# Patient Record
Sex: MALE | Race: WHITE | NOT HISPANIC OR LATINO | Employment: UNEMPLOYED | URBAN - METROPOLITAN AREA
[De-identification: names, ages, dates, MRNs, and addresses within clinical notes are randomized per-mention and may not be internally consistent; named-entity substitution may affect disease eponyms.]

---

## 2017-10-16 ENCOUNTER — ALLSCRIPTS OFFICE VISIT (OUTPATIENT)
Dept: OTHER | Facility: OTHER | Age: 52
End: 2017-10-16

## 2017-10-17 NOTE — PROGRESS NOTES
Assessment  1  Cervicalgia (723 1) (M54 2)    Plan  Allergic rhinitis    · Mometasone Furoate 50 MCG/ACT Nasal Suspension (Nasonex); USE 2  SPRAYS IN EACH NOSTRIL ONCE DAILY AS NEEDED FOR ALLERGIES  Anxiety    · LORazepam 1 MG Oral Tablet; TAKE ONE TABLET AT BEDTIME AS NEEDED  Cervicalgia    · Cyclobenzaprine HCl - 5 MG Oral Tablet; 1-2 tab PO at bedtime as needed for  muscle spasm   · Meloxicam 7 5 MG Oral Tablet; take 1- 2  tablet twice daily as needed for pain (do  not exceed for then 4 tabs daily)   · *1 - [facility]  *  Status: Active   Requested for: 98QMA5105  Care Summary provided  : Yes  Erectile dysfunction of non-organic origin    · Viagra 100 MG Oral Tablet; Take 1/2 to 1 tablet one hour pior to activity    Discussion/Summary    Cervicalgia - urged heat/massage/stretches - shown, will try trial of Meloxicam and Flexeril - SE of medication including sedation reviewed - urged no driving/operating heavy machinery until he knows how med affects him, stressed mult times do NOT take Flexeril with the Ativan, order for PT given, call with new/worsening symptoms or if not better in 2 wks  to do labs - new order given  to make PE as he is overdue and needs to discuss prostate and colon CA screening  The patient was counseled regarding instructions for management,-- risk factor reductions,-- prognosis,-- patient and family education,-- impressions,-- risks and benefits of treatment options,-- importance of compliance with treatment  Possible side effects of new medications were reviewed with the patient/guardian today  The treatment plan was reviewed with the patient/guardian  The patient/guardian understands and agrees with the treatment plan     Self Referrals: No      Chief Complaint  L shoulder pain      History of Present Illness  HPI: Pt here with L shoulder pain for about 2 wks  He is not able to id a specific fall/injury/trauma   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  The pain starts low cervical region and does not radiate  He states it is a tightness and does not radiate  The pain is intermittent and is worse at the end of the day and worse with certain movements of the neck  The pain is bad enough he is not sleeping well  He has such stiffness in the am upon awakening  He notes no UE radiation of symptoms  He notes no weakness/numbness/tingling  He has tried Ibuprofen w/o much relief  He has had 2 massages which do not help much either  He notes no previous h/o neck or shoulder surgery  still has not done his BW yet - new order given and told to do and call for appt for PE  refilled - anxiety well controlled and he uses it very rarely - only if can't relax and fall asleep      Review of Systems    Constitutional: no fever,-- no chills-- and-- no recent weight loss  ENT: no sore throat-- and-- no nasal discharge  Cardiovascular: no chest pain-- and-- no palpitations  Respiratory: no shortness of breath-- and-- no cough  Gastrointestinal: no abdominal pain,-- no constipation-- and-- no diarrhea  Genitourinary: no dysuria  Musculoskeletal: arthralgias-- and-- myalgias  Integumentary: no rashes  Neurological: no headache-- and-- no dizziness  Active Problems  1  Allergic rhinitis (477 9) (J30 9)   2  Anxiety (300 00) (F41 9)   3  Erectile dysfunction of non-organic origin (302 72) (F52 21)   4  Essential hypertriglyceridemia (272 1) (E78 1)   5  Insomnia (780 52) (G47 00)   6  Multiple joint pain (719 49) (M25 50)   7  Need for tetanus booster (V03 7) (Z23)   8  Obesity (278 00) (E66 9)   9  Other muscle spasm (728 85) (M62 838)   10  Screening for colon cancer (V76 51) (Z12 11)   11  Screening for prostate cancer (V76 44) (Z12 5)    Past Medical History  Active Problems And Past Medical History Reviewed: The active problems and past medical history were reviewed and updated today        Social History   · Being A Social Drinker   · Cigar smoker (305 1) (F10 290)   · Marital History - Currently    · Never A Smoker   · Working Full Time  The social history was reviewed and updated today  Family History  Family History Reviewed: The family history was reviewed and updated today  Current Meds   1  Aspirin 81 MG TABS; TAKE 1 TABLET DAILY; Therapy: 41YXH6696 to (Evaluate:69Rgz4474) Recorded   2  Glucosamine Chondroitin Complx Oral Tablet; TAKE 1 TABLET DAILY AS DIRECTED; Therapy: 17HAJ6169 to Recorded   3  LORazepam 1 MG Oral Tablet; TAKE ONE TABLET AT BEDTIME AS NEEDED; Therapy: 62Yhe5666 to (Evaluate:20Nov2016); Last Rx:22Btb2972 Ordered   4  Nasonex 50 MCG/ACT Nasal Suspension; USE 2 SPRAYS IN EACH NOSTRIL ONCE   DAILY AS NEEDED FOR ALLERGIES; Therapy: 62FVZ8344 to (Evaluate:70Jsk7596)  Requested for: 86Ste5348; Last   Rx:56Ofs4008 Ordered   5  Viagra 100 MG Oral Tablet; Take 1/2 to 1 tablet one hour pior to activity; Therapy: 72CVX0879 to (Evaluate:23Jan2017)  Requested for: 25Oct2016; Last   Rx:83Fnf1241 Ordered    The medication list was reviewed and updated today  Allergies  1  No Known Drug Allergies    Vitals   Recorded: 65IIH0491 03:47PM   Temperature 96 7 F   Heart Rate 78   Systolic 369   Diastolic 70   Height 5 ft 11 in   Weight 218 lb    BMI Calculated 30 41   BSA Calculated 2 19     Physical Exam    Constitutional   General appearance: No acute distress, well appearing and well nourished  Pulmonary   Respiratory effort: No increased work of breathing or signs of respiratory distress  Auscultation of lungs: Clear to auscultation, equal breath sounds bilaterally, no wheezes, no rales, no rhonci  Cardiovascular   Auscultation of heart: Normal rate and rhythm, normal S1 and S2, without murmurs      Examination of extremities for edema and/or varicosities: Normal     Musculoskeletal   Gait and station: Normal     Inspection/palpation of joints, bones, and muscles: Abnormal  -- no pain with palp of spinous processes of cervical spine, tenderness to L low cervical paraspinal musculature, dec in rotation B/L but nml flex/ext, 5/5 B/L UE muscle strength  Neurologic   Reflexes: 2+ and symmetric  -- 2/4 bicep reflexes B/L  Sensation: No sensory loss  -- sensation intact B/L UE to light touch     Psychiatric   Mood and affect: Normal          Signatures   Electronically signed by : Carlos A Montilla DO; Oct 16 2017  4:13PM EST                       (Author)

## 2017-11-14 ENCOUNTER — GENERIC CONVERSION - ENCOUNTER (OUTPATIENT)
Dept: OTHER | Facility: OTHER | Age: 52
End: 2017-11-14

## 2017-11-21 ENCOUNTER — APPOINTMENT (OUTPATIENT)
Dept: PHYSICAL THERAPY | Facility: CLINIC | Age: 52
End: 2017-11-21
Payer: COMMERCIAL

## 2017-11-21 PROCEDURE — G8979 MOBILITY GOAL STATUS: HCPCS

## 2017-11-21 PROCEDURE — G8978 MOBILITY CURRENT STATUS: HCPCS

## 2017-11-21 PROCEDURE — 97014 ELECTRIC STIMULATION THERAPY: CPT

## 2017-11-21 PROCEDURE — 97140 MANUAL THERAPY 1/> REGIONS: CPT

## 2017-11-21 PROCEDURE — 97010 HOT OR COLD PACKS THERAPY: CPT

## 2017-11-21 PROCEDURE — 97161 PT EVAL LOW COMPLEX 20 MIN: CPT

## 2017-11-21 PROCEDURE — G0283 ELEC STIM OTHER THAN WOUND: HCPCS

## 2017-11-24 ENCOUNTER — ALLSCRIPTS OFFICE VISIT (OUTPATIENT)
Dept: OTHER | Facility: OTHER | Age: 52
End: 2017-11-24

## 2017-11-24 ENCOUNTER — HOSPITAL ENCOUNTER (OUTPATIENT)
Dept: RADIOLOGY | Facility: HOSPITAL | Age: 52
Discharge: HOME | End: 2017-11-24
Payer: COMMERCIAL

## 2017-11-24 ENCOUNTER — TRANSCRIBE ORDERS (OUTPATIENT)
Dept: ADMINISTRATIVE | Facility: HOSPITAL | Age: 52
End: 2017-11-24

## 2017-11-24 DIAGNOSIS — M54.2 CERVICALGIA: ICD-10-CM

## 2017-11-24 PROCEDURE — 72050 X-RAY EXAM NECK SPINE 4/5VWS: CPT

## 2017-11-26 NOTE — PROGRESS NOTES
Assessment    1  Cervicalgia (723 1) (M54 2)   2  Other muscle spasm (728 85) (D36 035)    Plan  Cervicalgia    · PredniSONE 10 MG Oral Tablet; Take 4 tablets for 2 days, 3 tablets for 2 days, 2tablets for 2 days, 1 tablet for 2 days   · * XR SPINE CERVICAL COMPLETE 4 OR 5 VW NON INJURY; Status:Active; Requestedfor:24Nov2017; Other muscle spasm    · DiazePAM 5 MG Oral Tablet; Take 1 twice daily as needed for muscle spasm    Discussion/Summary    Ongoing neck pain w/muscle spasm of trapezius  Given non-response to 2 muscle relaxers and NSAID will use course of prednisone taper and short term use of valium to take for sleep (advised not to be used w/alcohol or lorazepam and do not drive or operate machinery after taking)  Given persistence, order given to x-ray c-spine  Pending response to meds and PT, may need consult to pain/spine center  shot declined today  Possible side effects of new medications were reviewed with the patient/guardian today  The treatment plan was reviewed with the patient/guardian  The patient/guardian understands and agrees with the treatment plan      Chief Complaint  shoulder pain       History of Present Illness  HPI: States he has seen Dr Romain Kellogg twice in a month for shoulder pain  Has started PT this week and will do so twice weekly  Told by PT he has a pinched nerve in his neck and bad muscle spasms  Not sleeping well despite meds given but states second scripts given did help better than first ones  PT did help  Has been using ice and heat for 4 weeks  body is exhausted due to no sleep and he has been getting sick  Review of Systems   Constitutional: feeling poorly  Musculoskeletal: myalgias, but-- as noted in HPI--   The patient presents with complaints of severe neck arthralgias, described as dull and aching, radiating to the left upper arm starting about 1 month ago  He is currently experiencing arthralgias  Active Problems  1  Allergic rhinitis (477 9) (J30 9)   2  Anxiety (300 00) (F41 9)   3  Cervicalgia (723 1) (M54 2)   4  Erectile dysfunction of non-organic origin (302 72) (F52 21)   5  Essential hypertriglyceridemia (272 1) (E78 1)   6  Insomnia (780 52) (G47 00)   7  Need for tetanus booster (V03 7) (Z23)   8  Obesity (278 00) (E66 9)   9  Other muscle spasm (728 85) (M62 838)   10  Screening for colon cancer (V76 51) (Z12 11)   11  Screening for prostate cancer (V76 44) (Z12 5)    Past Medical History  1  Denied: History of Alcohol abuse   2  History of Blood pressure elevated (401 9) (I10)   3  History of Encounter for pre-employment examination (V70 5) (Z02 1)   4  History of acute sinusitis (V12 69) (Z87 09)   5  Denied: History of depression   6  History of sinusitis (V12 69) (Z87 09)   7  Denied: History of substance abuse   8  History of Multiple joint pain (719 49) (M25 50)   9  History of URTI (acute upper respiratory infection) (465 9) (J06 9)  Active Problems And Past Medical History Reviewed: The active problems and past medical history were reviewed and updated today  Family History  Mother    1  Denied: Family history of Alcohol abuse   2  Family history of Diabetes Mellitus (V18 0)   3  Denied: Family history of depression   4  Denied: Family history of substance abuse  Father    5  Family history of Acute Myocardial Infarction (V17 3)   6  Denied: Family history of Alcohol abuse   7  Family history of Coronary Artery Disease (V17 49)   8  Denied: Family history of depression   9  Denied: Family history of substance abuse   10  Family history of Ischemic Stroke (V17 1)  Sibling    6  Denied: Family history of Alcohol abuse   12  Denied: Family history of depression   15  Denied: Family history of substance abuse    Social History     · Being A Social Drinker   · Cigar smoker (305 1) (F17 290)   · Marital History - Currently    · Never A Smoker   · Working Full Time  The social history was reviewed and updated today  Current Meds   1  Aspirin 81 MG TABS; TAKE 1 TABLET DAILY; Therapy: 57RQA0896 to (Evaluate:48Aal1466) Recorded   2  Glucosamine Chondroitin Complx Oral Tablet; TAKE 1 TABLET DAILY AS DIRECTED; Therapy: 48JCQ7015 to Recorded   3  LORazepam 1 MG Oral Tablet; TAKE ONE TABLET AT BEDTIME AS NEEDED; Therapy: 45YSR8865 to (Evaluate:14Jan2018); Last Rx:16Oct2017 Ordered   4  Methocarbamol 750 MG Oral Tablet; 1 tab PO q 8 hrs prn spasm; Therapy: 55KZT3862 to (Evaluate:88Veh5986)  Requested for: 34BTN8155; Last Rx:14Nov2017 Ordered   5  Mometasone Furoate 50 MCG/ACT Nasal Suspension; USE 2 SPRAYS IN EACH NOSTRIL ONCE DAILY AS NEEDED FOR ALLERGIES; Therapy: 17ICT4969 to (Evaluate:35Bdd7133)  Requested for: 83CVO0360; Last Rx:16Oct2017 Ordered   6  TraMADol HCl - 50 MG Oral Tablet; TAKE 1 TABLET EVERY 6 TO 8 HOURS AS NEEDED FOR PAIN; Therapy: 45QKL6850 to (Evaluate:24Nov2017); Last Rx:14Nov2017 Ordered   7  Viagra 100 MG Oral Tablet; Take 1/2 to 1 tablet one hour pior to activity; Therapy: 66TEX9821 to (Evaluate:14Jan2018)  Requested for: 92CRB9143; Last Rx:16Oct2017 Ordered    The medication list was reviewed and updated today  Allergies  1  No Known Drug Allergies    Vitals   Recorded: 04TPB9856 11:10AM   Temperature 96 F, Tympanic   Heart Rate 72   Systolic 881, Sitting   Diastolic 90, Sitting   BP CUFF SIZE Large   Height 6 ft    Weight 222 lb    BMI Calculated 30 11   BSA Calculated 2 23       Physical Exam   Constitutional  General appearance: Abnormal   uncomfortable  Eyes  Conjunctiva and lids: No swelling, erythema, or discharge  Pulmonary  Respiratory effort: No increased work of breathing or signs of respiratory distress  Musculoskeletal  Gait and station: Normal    Inspection/palpation of joints, bones, and muscles: Abnormal  -- tender left paracervical muscles to left thoracoscapular border, limited lateral bend/rotation of c-spine, FROM left shoulder    Psychiatric  Mood and affect: Abnormal   Mood and Affect: concerned-- and-- frustrated  Results/Data  PHQ-2 Adult Depression Screening 86QGO5964 11:12AM User, Ahs     Test Name Result Flag Reference   PHQ-2 Adult Depression Score 0       Over the last two weeks, how often have you been bothered by any of the following problems? Little interest or pleasure in doing things: Not at all - 0 Feeling down, depressed, or hopeless: Not at all - 0   PHQ-2 Adult Depression Screening Negative           Attending Note  Collaborating Physician Note: Collaborating Note: I agree with the Advanced Practitioner note  Signatures   Electronically signed by :  FRANKIE Garzon; Nov 24 2017 12:00PM EST                       (Author)    Electronically signed by : Keke Douglass MD; Nov 25 2017  6:57AM EST                       (Co-author)

## 2017-11-27 ENCOUNTER — GENERIC CONVERSION - ENCOUNTER (OUTPATIENT)
Dept: OTHER | Facility: OTHER | Age: 52
End: 2017-11-27

## 2017-11-28 ENCOUNTER — APPOINTMENT (OUTPATIENT)
Dept: PHYSICAL THERAPY | Facility: CLINIC | Age: 52
End: 2017-11-28
Payer: COMMERCIAL

## 2017-11-28 PROCEDURE — 97010 HOT OR COLD PACKS THERAPY: CPT

## 2017-11-28 PROCEDURE — 97140 MANUAL THERAPY 1/> REGIONS: CPT

## 2017-11-28 PROCEDURE — 97110 THERAPEUTIC EXERCISES: CPT

## 2017-11-30 ENCOUNTER — APPOINTMENT (OUTPATIENT)
Dept: PHYSICAL THERAPY | Facility: CLINIC | Age: 52
End: 2017-11-30
Payer: COMMERCIAL

## 2017-11-30 PROCEDURE — 97140 MANUAL THERAPY 1/> REGIONS: CPT

## 2017-11-30 PROCEDURE — 97110 THERAPEUTIC EXERCISES: CPT

## 2017-11-30 PROCEDURE — 97035 APP MDLTY 1+ULTRASOUND EA 15: CPT

## 2017-11-30 PROCEDURE — 97010 HOT OR COLD PACKS THERAPY: CPT

## 2017-12-05 ENCOUNTER — APPOINTMENT (OUTPATIENT)
Dept: PHYSICAL THERAPY | Facility: CLINIC | Age: 52
End: 2017-12-05
Payer: COMMERCIAL

## 2017-12-05 PROCEDURE — 97035 APP MDLTY 1+ULTRASOUND EA 15: CPT

## 2017-12-05 PROCEDURE — 97140 MANUAL THERAPY 1/> REGIONS: CPT

## 2017-12-05 PROCEDURE — G0283 ELEC STIM OTHER THAN WOUND: HCPCS

## 2017-12-05 PROCEDURE — 97010 HOT OR COLD PACKS THERAPY: CPT

## 2017-12-05 PROCEDURE — 97012 MECHANICAL TRACTION THERAPY: CPT

## 2017-12-05 PROCEDURE — 97014 ELECTRIC STIMULATION THERAPY: CPT

## 2017-12-07 ENCOUNTER — APPOINTMENT (OUTPATIENT)
Dept: PHYSICAL THERAPY | Facility: CLINIC | Age: 52
End: 2017-12-07
Payer: COMMERCIAL

## 2017-12-07 PROCEDURE — 97012 MECHANICAL TRACTION THERAPY: CPT

## 2017-12-07 PROCEDURE — 97010 HOT OR COLD PACKS THERAPY: CPT

## 2017-12-07 PROCEDURE — 97110 THERAPEUTIC EXERCISES: CPT

## 2017-12-07 PROCEDURE — 97140 MANUAL THERAPY 1/> REGIONS: CPT

## 2017-12-07 PROCEDURE — 97035 APP MDLTY 1+ULTRASOUND EA 15: CPT

## 2017-12-09 ENCOUNTER — ALLSCRIPTS OFFICE VISIT (OUTPATIENT)
Dept: OTHER | Facility: OTHER | Age: 52
End: 2017-12-09

## 2017-12-10 NOTE — PROGRESS NOTES
Assessment    1  Cervicalgia (723 1) (M54 2)   2  Cervical radiculopathy (723 4) (M54 12)   3  Osteoarthritis of cervical spine (721 0) (M45 812)    Plan  Cervicalgia    · Hydrocodone-Acetaminophen 5-325 MG Oral Tablet; TAKE 1-2  TABLET EVERY 6HOURS AS NEEDED FOR PAIN  Other muscle spasm    · From  DiazePAM 5 MG Oral Tablet Take 1 twice daily as needed for musclespasm To DiazePAM 10 MG Oral Tablet 1 tab by mouth nightly as needed    Discussion/Summary    Patient with 4 weeks of cervicalgia, neck spasm , and radiculopathy  failed use of flexeril and skelaxin  improvement with use of ibuprofen  no improvement with mobic  improvement with tramadol  with continued use of valium  10 mg nightly prn pain control options  agreed on Hydrocodone 5/325  SE/AR of the medication combination and by itself  No alcohol with the medication  driving with the combination until all adverse reaction known  will continue physical therapy  in 2 weeks  this is not improving will need further imaging and consideration of referral to spine surgeon or pain mgt  Chief Complaint  neck pain      History of Present Illness  patient with neck pain now for about 4-5 weeks  has had the most pain/discomfort at night  and ibuprofen have been ineffective  been on flexeril and skelaxin with no improvement  has however had improvement with the use of valium at night  at least helps him to sleep has also been going to physical therapy for 2 weeks  He has noted an improvement with therapy  also goes to chiropractic treatment weekly  with pain in the left upper back/neck  radiates down the left arm  Percieved muscle weakness, no sensory cahnges  Review of Systems   Constitutional: No fever or chills, feels well, no tiredness, no recent weight gain or weight loss,-- no fever-- and-- no chills  Cardiovascular: No complaints of slow heart rate, no fast heart rate, no chest pain, no palpitations, no leg claudication, no lower extremity    Respiratory: No complaints of shortness of breath, no wheezing, no cough, no SOB on exertion, no orthopnea or PND  Active Problems  1  Allergic rhinitis (477 9) (J30 9)   2  Anxiety (300 00) (F41 9)   3  Cervicalgia (723 1) (M54 2)   4  Erectile dysfunction of non-organic origin (302 72) (F52 21)   5  Essential hypertriglyceridemia (272 1) (E78 1)   6  Insomnia (780 52) (G47 00)   7  Need for tetanus booster (V03 7) (Z23)   8  Obesity (278 00) (E66 9)   9  Other muscle spasm (728 85) (M62 838)   10  Screening for colon cancer (V76 51) (Z12 11)   11  Screening for prostate cancer (V76 44) (Z12 5)    Past Medical History  1  Denied: History of Alcohol abuse   2  History of Blood pressure elevated (401 9) (I10)   3  Cervical radiculopathy (723 4) (M54 12)   4  History of Encounter for pre-employment examination (V70 5) (Z02 1)   5  History of acute sinusitis (V12 69) (Z87 09)   6  Denied: History of depression   7  History of sinusitis (V12 69) (Z87 09)   8  Denied: History of substance abuse   9  History of Multiple joint pain (719 49) (M25 50)   10  Osteoarthritis of cervical spine (721 0) (M47 812)   11  History of URTI (acute upper respiratory infection) (465 9) (J06 9)    Family History  Mother    1  Denied: Family history of Alcohol abuse   2  Family history of Diabetes Mellitus (V18 0)   3  Denied: Family history of depression   4  Denied: Family history of substance abuse  Father    5  Family history of Acute Myocardial Infarction (V17 3)   6  Denied: Family history of Alcohol abuse   7  Family history of Coronary Artery Disease (V17 49)   8  Denied: Family history of depression   9  Denied: Family history of substance abuse   10  Family history of Ischemic Stroke (V17 1)  Sibling    6  Denied: Family history of Alcohol abuse   12  Denied: Family history of depression   15   Denied: Family history of substance abuse    Social History     · Being A Social Drinker   · Cigar smoker (305 1) (Q27 611)   · Marital History - Currently    · Never A Smoker   · Working Full Time    Current Meds   1  Aspirin 81 MG TABS; TAKE 1 TABLET DAILY; Therapy: 63AXC2615 to (Evaluate:24Feb2013) Recorded   2  DiazePAM 5 MG Oral Tablet; Take 1 twice daily as needed for muscle spasm; Therapy: 97VGG3523 to (Last Rx:24Nov2017) Ordered   3  Glucosamine Chondroitin Complx Oral Tablet; TAKE 1 TABLET DAILY AS DIRECTED; Therapy: 74KTH4480 to Recorded   4  LORazepam 1 MG Oral Tablet; TAKE ONE TABLET AT BEDTIME AS NEEDED; Therapy: 86UEQ4778 to (Evaluate:14Jan2018); Last Rx:16Oct2017 Ordered   5  Mometasone Furoate 50 MCG/ACT Nasal Suspension; USE 2 SPRAYS IN EACH NOSTRIL ONCE DAILY AS NEEDED FOR ALLERGIES; Therapy: 04XHP8007 to (Evaluate:26Ukw6437)  Requested for: 33BAU6324; Last Rx:16Oct2017 Ordered   6  Viagra 100 MG Oral Tablet; Take 1/2 to 1 tablet one hour pior to activity; Therapy: 15USF2940 to (Evaluate:14Jan2018)  Requested for: 25ZBD2782; Last Rx:16Oct2017 Ordered    Allergies  1  No Known Drug Allergies    Vitals  Vital Signs    Recorded: 04CKQ3859 08:01AM   Temperature 96 1 F   Heart Rate 78   Systolic 850   Diastolic 90   Height 6 ft    Weight 223 lb    BMI Calculated 30 24   BSA Calculated 2 23       Physical Exam   Constitutional  General appearance: Abnormal   uncomfortable  Eyes  Conjunctiva and lids: No swelling, erythema, or discharge  Pupils and irises: Equal, round and reactive to light  Pulmonary  Respiratory effort: No increased work of breathing or signs of respiratory distress  Auscultation of lungs: Clear to auscultation, equal breath sounds bilaterally, no wheezes, no rales, no rhonci  Cardiovascular  Auscultation of heart: Normal rate and rhythm, normal S1 and S2, without murmurs  Examination of extremities for edema and/or varicosities: Normal    Musculoskeletal  Gait and station: Abnormal  -- spasm noted in the left upper neck and upper trapezius muscles   motor 5/5 but patient noting it feels weaker on the left side ( may be due to pain, sensory intact  Results/Data  * XR SPINE CERVICAL COMPLETE 4 OR 5 VW NON INJURY 74CCU0890 11:40AM Richelle Pollard Order Number: [id number]     Test Name Result Flag Reference   XR SPINE CERVICAL COMPLETE 4 OR 5 VW (Report)       CERVICAL SPINE   INDICATION: Neck pain  COMPARISON: January 19, 2016  VIEWS: 5; 5 images   FINDINGS:   No radiographic evidence of cervical spine fracture  There is slight reversal of normal cervical lordosis which could be positional  No anterolisthesis or retrolisthesis  Moderate uncinate and facet spondylosis is noted bilaterally, most severe on the right at C5-C6 and C6-C7 and on the left at C4-5  At each of these sites there is mild osseous foraminal narrowing  The prevertebral soft tissues are within normal limits  The lung apices are intact  IMPRESSION:   Moderate multilevel cervical degenerative changes as described  No acute osseous abnormality  Degenerative change has progressed since January 2006      Workstation performed: [id number]   Signed by:  Fareed Poole MD  11/27/17     Future Appointments    Date/Time Provider Specialty Site   12/27/2017 06:00 PM Emily Krause MD Family Medicine Wang Posadas MD       Signatures   Electronically signed by : Ray Christensen MD; Dec  9 2017 10:34AM EST                       (Author)

## 2017-12-12 ENCOUNTER — APPOINTMENT (OUTPATIENT)
Dept: PHYSICAL THERAPY | Facility: CLINIC | Age: 52
End: 2017-12-12
Payer: COMMERCIAL

## 2017-12-12 PROCEDURE — 97110 THERAPEUTIC EXERCISES: CPT

## 2017-12-12 PROCEDURE — 97140 MANUAL THERAPY 1/> REGIONS: CPT

## 2017-12-12 PROCEDURE — G0283 ELEC STIM OTHER THAN WOUND: HCPCS

## 2017-12-12 PROCEDURE — 97012 MECHANICAL TRACTION THERAPY: CPT

## 2017-12-12 PROCEDURE — 97014 ELECTRIC STIMULATION THERAPY: CPT

## 2017-12-14 ENCOUNTER — APPOINTMENT (OUTPATIENT)
Dept: PHYSICAL THERAPY | Facility: CLINIC | Age: 52
End: 2017-12-14
Payer: COMMERCIAL

## 2017-12-14 PROCEDURE — 97110 THERAPEUTIC EXERCISES: CPT

## 2017-12-14 PROCEDURE — 97014 ELECTRIC STIMULATION THERAPY: CPT

## 2017-12-14 PROCEDURE — 97035 APP MDLTY 1+ULTRASOUND EA 15: CPT

## 2017-12-14 PROCEDURE — 97010 HOT OR COLD PACKS THERAPY: CPT

## 2017-12-14 PROCEDURE — G0283 ELEC STIM OTHER THAN WOUND: HCPCS

## 2017-12-14 PROCEDURE — 97012 MECHANICAL TRACTION THERAPY: CPT

## 2017-12-19 ENCOUNTER — APPOINTMENT (OUTPATIENT)
Dept: PHYSICAL THERAPY | Facility: CLINIC | Age: 52
End: 2017-12-19
Payer: COMMERCIAL

## 2017-12-19 PROCEDURE — 97012 MECHANICAL TRACTION THERAPY: CPT

## 2017-12-21 ENCOUNTER — APPOINTMENT (OUTPATIENT)
Dept: PHYSICAL THERAPY | Facility: CLINIC | Age: 52
End: 2017-12-21
Payer: COMMERCIAL

## 2017-12-21 PROCEDURE — 97012 MECHANICAL TRACTION THERAPY: CPT

## 2017-12-21 PROCEDURE — 97010 HOT OR COLD PACKS THERAPY: CPT

## 2017-12-21 PROCEDURE — 97140 MANUAL THERAPY 1/> REGIONS: CPT

## 2017-12-21 PROCEDURE — 97035 APP MDLTY 1+ULTRASOUND EA 15: CPT

## 2017-12-26 ENCOUNTER — APPOINTMENT (OUTPATIENT)
Dept: PHYSICAL THERAPY | Facility: CLINIC | Age: 52
End: 2017-12-26
Payer: COMMERCIAL

## 2017-12-26 PROCEDURE — 97110 THERAPEUTIC EXERCISES: CPT

## 2017-12-26 PROCEDURE — 97012 MECHANICAL TRACTION THERAPY: CPT

## 2017-12-26 PROCEDURE — 97010 HOT OR COLD PACKS THERAPY: CPT

## 2017-12-26 PROCEDURE — 97140 MANUAL THERAPY 1/> REGIONS: CPT

## 2017-12-27 ENCOUNTER — GENERIC CONVERSION - ENCOUNTER (OUTPATIENT)
Dept: OTHER | Facility: OTHER | Age: 52
End: 2017-12-27

## 2017-12-28 ENCOUNTER — APPOINTMENT (OUTPATIENT)
Dept: PHYSICAL THERAPY | Facility: CLINIC | Age: 52
End: 2017-12-28
Payer: COMMERCIAL

## 2018-01-02 ENCOUNTER — APPOINTMENT (OUTPATIENT)
Dept: PHYSICAL THERAPY | Facility: CLINIC | Age: 53
End: 2018-01-02
Payer: COMMERCIAL

## 2018-01-04 ENCOUNTER — APPOINTMENT (OUTPATIENT)
Dept: PHYSICAL THERAPY | Facility: CLINIC | Age: 53
End: 2018-01-04
Payer: COMMERCIAL

## 2018-01-09 ENCOUNTER — APPOINTMENT (OUTPATIENT)
Dept: PHYSICAL THERAPY | Facility: CLINIC | Age: 53
End: 2018-01-09
Payer: COMMERCIAL

## 2018-01-09 PROCEDURE — 97112 NEUROMUSCULAR REEDUCATION: CPT

## 2018-01-09 PROCEDURE — 97110 THERAPEUTIC EXERCISES: CPT

## 2018-01-09 PROCEDURE — 97012 MECHANICAL TRACTION THERAPY: CPT

## 2018-01-09 PROCEDURE — 97140 MANUAL THERAPY 1/> REGIONS: CPT

## 2018-01-11 ENCOUNTER — APPOINTMENT (OUTPATIENT)
Dept: PHYSICAL THERAPY | Facility: CLINIC | Age: 53
End: 2018-01-11
Payer: COMMERCIAL

## 2018-01-11 PROCEDURE — 97010 HOT OR COLD PACKS THERAPY: CPT

## 2018-01-11 PROCEDURE — 97012 MECHANICAL TRACTION THERAPY: CPT

## 2018-01-11 PROCEDURE — 97112 NEUROMUSCULAR REEDUCATION: CPT

## 2018-01-11 PROCEDURE — 97140 MANUAL THERAPY 1/> REGIONS: CPT

## 2018-01-13 VITALS
TEMPERATURE: 96 F | HEIGHT: 72 IN | WEIGHT: 222 LBS | BODY MASS INDEX: 30.07 KG/M2 | SYSTOLIC BLOOD PRESSURE: 134 MMHG | HEART RATE: 72 BPM | DIASTOLIC BLOOD PRESSURE: 90 MMHG

## 2018-01-13 VITALS
BODY MASS INDEX: 30.52 KG/M2 | WEIGHT: 218 LBS | DIASTOLIC BLOOD PRESSURE: 70 MMHG | SYSTOLIC BLOOD PRESSURE: 132 MMHG | TEMPERATURE: 96.7 F | HEIGHT: 71 IN | HEART RATE: 78 BPM

## 2018-01-13 NOTE — MISCELLANEOUS
Message   Recorded as Task   Date: 10/24/2016 05:48 PM, Created By: Becca Castillo   Task Name: Follow Up   Assigned To: [address] [place] [address]   Regarding Patient: Yuridia Easley, Status: Active   Comment:    Soha Maciel - 24 Oct 2016 5:48 PM     TASK CREATED  Caller: Self; General Medical Question; [phone] (Mobile Phone); [phone] (Mobile Phone)  asking for valium to be used in exchange for lorazapam on occasion; states he is using the lorazapam only as needed, but there are times he feels he needs more - does he need to be seen to discuss this?    also, viagra order put in   Dellie Riding - 24 Oct 2016 8:25 PM     TASK REPLIED TO: Previously Assigned To [address] [place] [address]                      will not give Valium as it is long acting and has increased risk for sedation con't lorazepam or has to make appt to discuss further   Sapna Luis - 25 Oct 2016 7:55 AM     TASK EDITED   Pt aware        Active Problems    1  Allergic rhinitis (477 9) (J30 9)   2  Anxiety (300 00) (F41 9)   3  Erectile dysfunction of non-organic origin (302 72) (F52 21)   4  Essential hypertriglyceridemia (272 1) (E78 1)   5  Insomnia (780 52) (G47 00)   6  Multiple joint pain (719 49) (M25 50)   7  Need for tetanus booster (V03 7) (Z23)   8  Obesity (278 00) (E66 9)   9  Other muscle spasm (728 85) (M62 838)   10  Screening for colon cancer (V76 51) (Z12 11)   11  Screening for prostate cancer (V76 44) (Z12 5)    Current Meds   1  Aspirin 81 MG TABS; TAKE 1 TABLET DAILY; Therapy: 62EDF2101 to (Evaluate:24Iry2050) Recorded   2  Glucosamine Chondroitin Complx Oral Tablet; TAKE 1 TABLET DAILY AS DIRECTED; Therapy: 20WUU9102 to Recorded   3  LORazepam 1 MG Oral Tablet; TAKE ONE TABLET AT BEDTIME AS NEEDED; Therapy: 74Xee9607 to (Evaluate:20Nov2016); Last Rx:99Iat0825 Ordered   4  Nasonex 50 MCG/ACT Nasal Suspension; USE 2 SPRAYS IN EACH NOSTRIL ONCE   DAILY AS NEEDED FOR ALLERGIES;    Therapy: 06YDQ2180 to (Evaluate:68Smi6446)  Requested for: 82Qvl4159; Last   Rx:[id number] Ordered   5  Viagra 100 MG Oral Tablet; Take 1/2 to 1 tablet one hour pior to activity; Therapy: 76YFG5141 to (Evaluate:21Mar2016)  Requested for: 48Qkv3296; Last   Rx:[id number] Ordered    Allergies    1   No Known Drug Allergies    Signatures   Electronically signed by : Jackson Ray DO; Oct 25 2016  7:56AM EST                       (Author)

## 2018-01-16 ENCOUNTER — APPOINTMENT (OUTPATIENT)
Dept: PHYSICAL THERAPY | Facility: CLINIC | Age: 53
End: 2018-01-16
Payer: COMMERCIAL

## 2018-01-16 NOTE — RESULT NOTES
Discussion/Summary    Neck x-ray shows moderate arthritic changes in multiple levels of neck  Recommend MRI if neck pain continues  pt aware         Verified Results  * XR SPINE CERVICAL COMPLETE 4 OR 5 VW NON INJURY 63WFT8060 11:40AM Sabrina [facility] Order Number: [id number]     Test Name Result Flag Reference   XR SPINE CERVICAL COMPLETE 4 OR 5 VW (Report)     CERVICAL SPINE     INDICATION: Neck pain  COMPARISON: January 19, 2016  VIEWS: 5; 5 images     FINDINGS:     No radiographic evidence of cervical spine fracture  There is slight reversal of normal cervical lordosis which could be positional  No anterolisthesis or retrolisthesis  Moderate uncinate and facet spondylosis is noted bilaterally, most severe on the right at C5-C6 and C6-C7 and on the left at C4-5  At each of these sites there is mild osseous foraminal narrowing  The prevertebral soft tissues are within normal limits  The lung apices are intact  IMPRESSION:     Moderate multilevel cervical degenerative changes as described  No acute osseous abnormality  Degenerative change has progressed since January 2006         Workstation performed: [id number]     Signed by:   Drew Barahona MD   11/27/17

## 2018-01-18 ENCOUNTER — APPOINTMENT (OUTPATIENT)
Dept: PHYSICAL THERAPY | Facility: CLINIC | Age: 53
End: 2018-01-18
Payer: COMMERCIAL

## 2018-01-18 ENCOUNTER — GENERIC CONVERSION - ENCOUNTER (OUTPATIENT)
Dept: OTHER | Facility: OTHER | Age: 53
End: 2018-01-18

## 2018-01-18 PROCEDURE — 97112 NEUROMUSCULAR REEDUCATION: CPT

## 2018-01-18 PROCEDURE — 97012 MECHANICAL TRACTION THERAPY: CPT

## 2018-01-18 PROCEDURE — 97010 HOT OR COLD PACKS THERAPY: CPT

## 2018-01-18 PROCEDURE — 97140 MANUAL THERAPY 1/> REGIONS: CPT

## 2018-01-18 PROCEDURE — G8979 MOBILITY GOAL STATUS: HCPCS

## 2018-01-18 PROCEDURE — G8978 MOBILITY CURRENT STATUS: HCPCS

## 2018-01-22 VITALS
TEMPERATURE: 97.4 F | HEART RATE: 80 BPM | HEIGHT: 72 IN | BODY MASS INDEX: 31.15 KG/M2 | DIASTOLIC BLOOD PRESSURE: 98 MMHG | WEIGHT: 230 LBS | SYSTOLIC BLOOD PRESSURE: 142 MMHG

## 2018-01-23 ENCOUNTER — APPOINTMENT (OUTPATIENT)
Dept: PHYSICAL THERAPY | Facility: CLINIC | Age: 53
End: 2018-01-23
Payer: COMMERCIAL

## 2018-01-23 VITALS
DIASTOLIC BLOOD PRESSURE: 90 MMHG | HEIGHT: 72 IN | SYSTOLIC BLOOD PRESSURE: 142 MMHG | WEIGHT: 223 LBS | BODY MASS INDEX: 30.2 KG/M2 | TEMPERATURE: 96.1 F | HEART RATE: 78 BPM

## 2018-01-23 PROCEDURE — 97012 MECHANICAL TRACTION THERAPY: CPT

## 2018-01-23 PROCEDURE — 97112 NEUROMUSCULAR REEDUCATION: CPT

## 2018-01-24 VITALS
BODY MASS INDEX: 30.15 KG/M2 | SYSTOLIC BLOOD PRESSURE: 120 MMHG | RESPIRATION RATE: 14 BRPM | TEMPERATURE: 98.8 F | HEART RATE: 100 BPM | DIASTOLIC BLOOD PRESSURE: 96 MMHG | WEIGHT: 222.6 LBS | HEIGHT: 72 IN

## 2018-01-25 ENCOUNTER — OFFICE VISIT (OUTPATIENT)
Dept: PHYSICAL THERAPY | Facility: CLINIC | Age: 53
End: 2018-01-25
Payer: COMMERCIAL

## 2018-01-25 DIAGNOSIS — M54.2 CERVICALGIA: Primary | ICD-10-CM

## 2018-01-25 PROCEDURE — 97110 THERAPEUTIC EXERCISES: CPT

## 2018-01-25 PROCEDURE — 97010 HOT OR COLD PACKS THERAPY: CPT

## 2018-01-25 PROCEDURE — 97012 MECHANICAL TRACTION THERAPY: CPT

## 2018-01-25 PROCEDURE — 97140 MANUAL THERAPY 1/> REGIONS: CPT

## 2018-01-25 NOTE — PROGRESS NOTES
Daily Note     Today's date: 2018  Patient name: Elliott Villafuerte II  : 1965  MRN: [id number]  Referring provider: Sunday Sommer DO  Dx:   Encounter Diagnosis   Name Primary?  Cervicalgia Yes                  Subjective: Pt notes that he felt pretty good  Notes that he started to feeling the paraesthesia in to his L forearm starting around 1pm  Notes he lifted very heavy piece of equipment in earlier in the morning  Objective: See treatment diary below      Assessment: Pt noted improved s/s into forearm with c/s traction and nerve glides  Focused primarily on scap stability TE today  Continued with IASTM as pt was unsure if helped  Plan: To monitor and progress as able nv       Precautions: None    Daily Treatment Diary     Manual              Median & Radial  nerve glide during c/s traction ANC            IASTM to scap (l) - GENTLE - seated ANC                                                       Exercise Diary              UBE - retro 3'             T/s rotations - gentle 15x             Mod prone rows b/l  15x 2            Mob prone ext/ MT b/l 15x 2            TB rows/ LPD (BTB) 15x 2            Walk outs - lateral at cc w/ arms out resume nv            Box lifts (FORM) NV                                                                                                                                                                                         Modalities              CP x 10'  41 [facility]

## 2018-01-29 DIAGNOSIS — N52.9 ERECTILE DYSFUNCTION, UNSPECIFIED ERECTILE DYSFUNCTION TYPE: Primary | ICD-10-CM

## 2018-01-29 RX ORDER — SILDENAFIL 100 MG/1
.5-1 TABLET, FILM COATED ORAL
COMMUNITY
Start: 2013-02-08 | End: 2018-01-29 | Stop reason: SDUPTHER

## 2018-01-30 ENCOUNTER — APPOINTMENT (OUTPATIENT)
Dept: PHYSICAL THERAPY | Facility: CLINIC | Age: 53
End: 2018-01-30
Payer: COMMERCIAL

## 2018-01-31 RX ORDER — SILDENAFIL 100 MG/1
50-100 TABLET, FILM COATED ORAL AS NEEDED
Qty: 10 TABLET | Refills: 0 | Status: SHIPPED | OUTPATIENT
Start: 2018-01-31 | End: 2019-06-19

## 2018-02-01 ENCOUNTER — OFFICE VISIT (OUTPATIENT)
Dept: PHYSICAL THERAPY | Facility: CLINIC | Age: 53
End: 2018-02-01
Payer: COMMERCIAL

## 2018-02-01 DIAGNOSIS — M54.2 CERVICALGIA: Primary | ICD-10-CM

## 2018-02-01 PROCEDURE — 97012 MECHANICAL TRACTION THERAPY: CPT

## 2018-02-01 PROCEDURE — 97530 THERAPEUTIC ACTIVITIES: CPT

## 2018-02-01 PROCEDURE — 97140 MANUAL THERAPY 1/> REGIONS: CPT

## 2018-02-01 PROCEDURE — 97110 THERAPEUTIC EXERCISES: CPT

## 2018-02-01 NOTE — PROGRESS NOTES
Daily Note     Today's date: 2018  Patient name: Nimisha Oakes  : 1965  MRN: [id number]  Referring provider: Teja Yancey DO  Dx:   Encounter Diagnosis   Name Primary?  Cervicalgia Yes                  Subjective: Pt notes that he feels pretty good  States he did a lot of lifting today noting some s/s of parasthesia into forearm while on drive home  Objective: See treatment diary below      Assessment: Pt noted improved s/s into forearm with c/s traction and nerve glides  Incorporated self median nerve glides today with improved s/s of forearm parasthesia post  Pt reports needing to lift large awkward items at work  Reviewed lifting form noting good overall form when floor <> waist and waist<> overhead  Plan: To monitor and progress as able nv       Precautions: None    Daily Treatment Diary     Manual             Median & Radial  nerve glide during c/s traction ANC 3'           IASTM to scap (l) - GENTLE - seated ANC 5'                                                      Exercise Diary             UBE - retro 3'  3'           T/s rotations - gentle 15x  15x           Mod prone rows b/l  15x 2 15x 2           Mod prone ext/ MT b/l 15x 2 15x 2           TB rows/ LPD (BTB) 15x 2 15x  ea           Walk outs - lateral at cc w/ arms out resume nv '' ''           Box lifts (FORM) NV 10'            pec stretch on doorway  30"X3 u/l           Self median nerve glides  10x                                                                                                                                                              Modalities             CP x 10'  41 [facility] NP

## 2018-02-08 ENCOUNTER — APPOINTMENT (OUTPATIENT)
Dept: PHYSICAL THERAPY | Facility: CLINIC | Age: 53
End: 2018-02-08
Payer: COMMERCIAL

## 2018-02-13 ENCOUNTER — OFFICE VISIT (OUTPATIENT)
Dept: PHYSICAL THERAPY | Facility: CLINIC | Age: 53
End: 2018-02-13
Payer: COMMERCIAL

## 2018-02-13 DIAGNOSIS — M54.2 CERVICALGIA: Primary | ICD-10-CM

## 2018-02-13 PROCEDURE — 97110 THERAPEUTIC EXERCISES: CPT

## 2018-02-13 NOTE — PROGRESS NOTES
Daily Note     Today's date: 2018  Patient name: Virginia Saldana II  : 1965  MRN: [id number]  Referring provider: Krish Sanchez DO  Dx:   Encounter Diagnosis   Name Primary?  Cervicalgia Yes                  Subjective: Pt reports pain into his L hand however, notes that is not related to his neck  States he injured his thumb somehow  Demonstrated lack of thumb AROM and crepitus audible  Objective: See treatment diary below      Assessment: Pt w/ FOTO score of 48 points (1 point lower than start) however, pt reports significantly less pain into L arm and no longer complains of debilitating pain in c/s muscles  Pt able to complete ADLs and tasks at work without much pain in L arm and notes that nerve glides will now resolve parasthesia into L hand  Reviewed HEP, discussed pt questions, and emailed/ dispensed to pt along with MTB  Plan: To place pt on hold for two weeks with plan to d/c if pt does not return to therapy   ( as per primary PT )    Precautions: None    Daily Treatment Diary     Manual            Median & Radial  nerve glide during c/s traction ANC 3' NP -- reviewed as self nerve glide          IASTM to scap (l) - GENTLE - seated ANC 5' NP                                                     Exercise Diary            UBE - retro 3'  3' 3'          T/s rotations - gentle 15x  15x reivewed 15x           Mod prone rows b/l  15x 2 15x 2 reviewed 15x 2 (2#)          Mod prone ext/ MT b/l 15x 2 15x 2 reviewed 15x 2 (2#)          TB rows/ LPD (BTB) 15x 2 15x  ea Reviewed 15x -- Reviewed on CC as well 55#           Walk outs - lateral at cc w/ arms out resume nv '' '' NP          Box lifts (FORM) NV 10'  NP          pec stretch on doorway  30"X3 u/l NP          Self median nerve glides  10x reviewed          CC rows    15x 45# Modalities  1/25 2/1 2/13          CP x 10'  41 [facility] NP np

## 2018-02-13 NOTE — LETTER
Dr Silvino Garcia,    [address] [facility],[address] skilled PT tx has been placed on hold for 2 weeks due to progress made in therapy as he transitioned to updated HEP  Pt has not returned for tx during this time and will be d/c'd from skilled PT as discussed w/ him at his Lv  Thank you       Sincerely,   DEB LucasT

## 2018-04-05 ENCOUNTER — OFFICE VISIT (OUTPATIENT)
Dept: FAMILY MEDICINE CLINIC | Facility: HOSPITAL | Age: 53
End: 2018-04-05
Payer: COMMERCIAL

## 2018-04-05 VITALS
HEART RATE: 80 BPM | SYSTOLIC BLOOD PRESSURE: 138 MMHG | DIASTOLIC BLOOD PRESSURE: 82 MMHG | WEIGHT: 234 LBS | BODY MASS INDEX: 32.76 KG/M2 | RESPIRATION RATE: 14 BRPM | TEMPERATURE: 96.6 F | HEIGHT: 71 IN

## 2018-04-05 DIAGNOSIS — L02.214 SOFT TISSUE ABSCESS OF INGUINAL REGION: Primary | ICD-10-CM

## 2018-04-05 PROCEDURE — 3008F BODY MASS INDEX DOCD: CPT | Performed by: FAMILY MEDICINE

## 2018-04-05 PROCEDURE — 99213 OFFICE O/P EST LOW 20 MIN: CPT | Performed by: FAMILY MEDICINE

## 2018-04-05 RX ORDER — CEPHALEXIN 500 MG/1
500 CAPSULE ORAL EVERY 8 HOURS SCHEDULED
Qty: 21 CAPSULE | Refills: 0 | Status: SHIPPED | OUTPATIENT
Start: 2018-04-05 | End: 2018-04-12

## 2018-04-05 RX ORDER — LORAZEPAM 1 MG/1
TABLET ORAL
COMMUNITY
Start: 2015-04-28 | End: 2019-01-02 | Stop reason: SDUPTHER

## 2018-04-05 RX ORDER — DIAZEPAM 10 MG/1
TABLET ORAL
COMMUNITY
Start: 2017-11-24 | End: 2019-01-02 | Stop reason: SDUPTHER

## 2018-04-05 NOTE — PROGRESS NOTES
[facility]  [address] [address]  Tel: [phone]    Patient is here for an acute visit    With a mass on the right inguinal area  Warm and tender  Has had this in the past in other parts of the body  No discharge  No fever, no chills  Also noting several areas of arthritis  He take otc aleve, 3 tabs at a time but only about once a week  Advil has not been effective                  -----------------------------  Review of Systems   Constitutional: Negative  Negative for activity change, appetite change, chills and diaphoresis  HENT: Negative for congestion and dental problem  Respiratory: Negative  Negative for apnea, chest tightness, shortness of breath and wheezing  Cardiovascular: Negative  Negative for chest pain, palpitations and leg swelling  Gastrointestinal: Negative  Negative for abdominal distention, abdominal pain, constipation, diarrhea and nausea  Genitourinary: Negative  Negative for difficulty urinating, dysuria and frequency  Social Hx reviewed:    Social History     Social History    Marital status: /Civil Union     Spouse name: N/A    Number of children: N/A    Years of education: N/A     Occupational History    Not on file  Social History Main Topics    Smoking status: Never Smoker    Smokeless tobacco: Never Used    Alcohol use No    Drug use: No    Sexual activity: Not on file     Other Topics Concern    Not on file     Social History Narrative    No narrative on file       History reviewed  No pertinent past medical history  No Known Allergies      Vitals:    04/05/18 0742   BP: 138/82   Pulse: 80   Resp: 14   Temp: (!) 96 6 °F (35 9 °C)     Physical Exam   Skin:   Right inguinal area: mass, measuing about 15 mm in length, tender, slightly fluctuant, erythematous                Problem List Items Addressed This Visit     None      Visit Diagnoses     Soft tissue abscess of inguinal region -  Primary    Relevant Medications    cephalexin (KEFLEX) 500 mg capsule      treat with warm compresses and oral antiboitics  If not improving will need referral to surgery for possible Incision and drainage  To call our office if any concerns/questions at [phone]

## 2018-04-05 NOTE — PATIENT INSTRUCTIONS
Abscess   AMBULATORY CARE:   An abscess  is an area under the skin where pus (infected fluid) collects  An abscess is often caused by bacteria, fungi or other germs that get into an open wound  You can get an abscess anywhere on your body  Common signs and symptoms of an abscess: You may have a swollen mass that is red and painful  Pus may leak out of the mass  The pus will be white or yellow and may smell bad  You may have redness and pain days before the mass appears  You may have a fever and chills if the infection spreads  Seek immediate care if:   · The area around your abscess becomes very painful, warm, or has red streaks  · You have a fever and chills  · Your heart is beating faster than usual      · You feel faint or confused  Contact your healthcare provider if:   · Your abscess gets bigger or does not get better  · Your abscess returns  · You have questions or concerns about your condition or care  Treatment for an abscess: Your healthcare provider may need to make a cut in the abscess to allow the pus to drain  You may need surgery to remove your abscess  You may  need any of the following:  · Antibiotics  help treat a bacterial infection  · Acetaminophen  decreases pain and fever  It is available without a doctor's order  Ask how much to take and how often to take it  Follow directions  Acetaminophen can cause liver damage if not taken correctly  · NSAIDs , such as ibuprofen, help decrease swelling, pain, and fever  This medicine is available with or without a doctor's order  NSAIDs can cause stomach bleeding or kidney problems in certain people  If you take blood thinner medicine, always ask your healthcare provider if NSAIDs are safe for you  Always read the medicine label and follow directions  · Take your medicine as directed  Contact your healthcare provider if you think your medicine is not helping or if you have side effects   Tell him or her if you are allergic to any medicine  Keep a list of the medicines, vitamins, and herbs you take  Include the amounts, and when and why you take them  Bring the list or the pill bottles to follow-up visits  Carry your medicine list with you in case of an emergency  Self-care:   · Apply a warm compress to your abscess  This will help it open and drain  Wet a washcloth in warm, but not hot, water  Apply the compress for 10 minutes  Repeat this 4 times each day  Do not  press on an abscess or try to open it with a needle  You may push the bacteria deeper or into your blood  · Do not share your clothes, towels, or sheets with anyone  This can spread the infection to others  · Wash your hands often  This can help prevent the spread of germs  Use soap and water or an alcohol-based hand rub  Care for your wound after it is drained:   · Care for your wound as directed  If your healthcare provider says it is okay, carefully remove the bandage and gauze packing  You may need to soak the gauze to get it out of your wound  Clean your wound and the area around it as directed  Dry the area and put on new, clean bandages  Change your bandages when they get wet or dirty  · Ask your healthcare provider how to change the gauze in your wound  Keep track of how many pieces of gauze are placed inside the wound  Do not put too much packing in the wound  Do not pack the gauze too tightly in your wound  Follow up with your healthcare provider in 1 to 3 days: You may need to have your packing removed or your bandage changed  Write down your questions so you remember to ask them during your visits  © 2017 [employer] [employer] Information is for End User's use only and may not be sold, redistributed or otherwise used for commercial purposes  All illustrations and images included in CareNotes® are the copyrighted property of [employer]  or [employer]  The above information is an  only   It is not intended as medical advice for individual conditions or treatments  Talk to your doctor, nurse or pharmacist before following any medical regimen to see if it is safe and effective for you

## 2018-04-21 ENCOUNTER — TELEPHONE (OUTPATIENT)
Dept: FAMILY MEDICINE CLINIC | Facility: HOSPITAL | Age: 53
End: 2018-04-21

## 2018-04-21 NOTE — TELEPHONE ENCOUNTER
Patient has spot on arm that you Rx-ed abx for  Finished abx about a week ago - states it is 90 percent cleared up but there is still a small bump  Wants to know if he could have another round of abx or if he needs it re-evaluated?

## 2018-04-23 DIAGNOSIS — L02.214 CUTANEOUS ABSCESS OF GROIN: Primary | ICD-10-CM

## 2018-04-23 RX ORDER — CEPHALEXIN 500 MG/1
500 CAPSULE ORAL EVERY 6 HOURS SCHEDULED
Qty: 28 CAPSULE | Refills: 0 | Status: SHIPPED | OUTPATIENT
Start: 2018-04-23 | End: 2018-04-30

## 2018-05-18 ENCOUNTER — TELEPHONE (OUTPATIENT)
Dept: FAMILY MEDICINE CLINIC | Facility: HOSPITAL | Age: 53
End: 2018-05-18

## 2018-05-18 DIAGNOSIS — F41.9 ANXIETY: ICD-10-CM

## 2018-05-18 DIAGNOSIS — Z12.5 SCREENING FOR PROSTATE CANCER: ICD-10-CM

## 2018-05-18 DIAGNOSIS — E66.9 OBESITY, UNSPECIFIED CLASSIFICATION, UNSPECIFIED OBESITY TYPE, UNSPECIFIED WHETHER SERIOUS COMORBIDITY PRESENT: Primary | ICD-10-CM

## 2018-05-18 DIAGNOSIS — Z13.29 SCREENING FOR THYROID DISORDER: ICD-10-CM

## 2018-05-18 DIAGNOSIS — E78.1 ESSENTIAL HYPERTRIGLYCERIDEMIA: ICD-10-CM

## 2018-05-18 PROBLEM — M47.812 OSTEOARTHRITIS OF CERVICAL SPINE: Status: ACTIVE | Noted: 2017-12-09

## 2018-05-18 PROBLEM — M54.12 CERVICAL RADICULOPATHY: Status: ACTIVE | Noted: 2017-12-09

## 2018-05-18 NOTE — TELEPHONE ENCOUNTER
Pt would like to have physical labs ordered for [facility], prostate levels, sugar, thyroid and everything else general  He would like them mailed to his home address please

## 2018-05-18 NOTE — TELEPHONE ENCOUNTER
Order in [address] and should print on back printer to mail to pt    TY - 10-12 hr fast please

## 2018-06-08 LAB
ALBUMIN SERPL-MCNC: 4.4 G/DL (ref 3.6–5.1)
ALBUMIN/GLOB SERPL: 1.8 (CALC) (ref 1–2.5)
ALP SERPL-CCNC: 81 U/L (ref 40–115)
ALT SERPL-CCNC: 15 U/L (ref 9–46)
AST SERPL-CCNC: 14 U/L (ref 10–35)
BILIRUB SERPL-MCNC: 0.6 MG/DL (ref 0.2–1.2)
BUN SERPL-MCNC: 16 MG/DL (ref 7–25)
BUN/CREAT SERPL: NORMAL (CALC) (ref 6–22)
CALCIUM SERPL-MCNC: 9.4 MG/DL (ref 8.6–10.3)
CHLORIDE SERPL-SCNC: 103 MMOL/L (ref 98–110)
CHOLEST SERPL-MCNC: 181 MG/DL
CHOLEST/HDLC SERPL: 4.6 (CALC)
CO2 SERPL-SCNC: 26 MMOL/L (ref 20–31)
CREAT SERPL-MCNC: 1 MG/DL (ref 0.7–1.33)
ERYTHROCYTE [DISTWIDTH] IN BLOOD BY AUTOMATED COUNT: 12.5 % (ref 11–15)
GLOBULIN SER CALC-MCNC: 2.4 G/DL (CALC) (ref 1.9–3.7)
GLUCOSE SERPL-MCNC: 96 MG/DL (ref 65–99)
HCT VFR BLD AUTO: 49.1 % (ref 38.5–50)
HDLC SERPL-MCNC: 39 MG/DL
HGB BLD-MCNC: 17.1 G/DL (ref 13.2–17.1)
LDLC SERPL CALC-MCNC: 122 MG/DL (CALC)
MCH RBC QN AUTO: 33.3 PG (ref 27–33)
MCHC RBC AUTO-ENTMCNC: 34.8 G/DL (ref 32–36)
MCV RBC AUTO: 95.5 FL (ref 80–100)
NONHDLC SERPL-MCNC: 142 MG/DL (CALC)
PLATELET # BLD AUTO: 247 THOUSAND/UL (ref 140–400)
PMV BLD REES-ECKER: 9.8 FL (ref 7.5–12.5)
POTASSIUM SERPL-SCNC: 4.3 MMOL/L (ref 3.5–5.3)
PROT SERPL-MCNC: 6.8 G/DL (ref 6.1–8.1)
PSA SERPL-MCNC: 0.7 NG/ML
RBC # BLD AUTO: 5.14 MILLION/UL (ref 4.2–5.8)
SL AMB EGFR AFRICAN AMERICAN: 100 ML/MIN/1.73M2
SL AMB EGFR NON AFRICAN AMERICAN: 86 ML/MIN/1.73M2
SODIUM SERPL-SCNC: 138 MMOL/L (ref 135–146)
TRIGL SERPL-MCNC: 101 MG/DL
TSH SERPL-ACNC: 2.38 MIU/L (ref 0.4–4.5)
WBC # BLD AUTO: 6.5 THOUSAND/UL (ref 3.8–10.8)

## 2018-07-09 ENCOUNTER — TELEPHONE (OUTPATIENT)
Dept: FAMILY MEDICINE CLINIC | Facility: HOSPITAL | Age: 53
End: 2018-07-09

## 2018-07-09 DIAGNOSIS — M54.12 CERVICAL RADICULOPATHY: Primary | ICD-10-CM

## 2018-07-09 NOTE — TELEPHONE ENCOUNTER
Would like an order for PT at [facility]   He has discussed this with Dr Johanna Cade, he has decided to proceed

## 2018-07-17 ENCOUNTER — EVALUATION (OUTPATIENT)
Dept: PHYSICAL THERAPY | Facility: CLINIC | Age: 53
End: 2018-07-17
Payer: COMMERCIAL

## 2018-07-17 DIAGNOSIS — M25.512 ACUTE PAIN OF LEFT SHOULDER: Primary | ICD-10-CM

## 2018-07-17 DIAGNOSIS — M54.12 CERVICAL RADICULOPATHY: ICD-10-CM

## 2018-07-17 PROCEDURE — G8981 BODY POS CURRENT STATUS: HCPCS | Performed by: PHYSICAL THERAPIST

## 2018-07-17 PROCEDURE — 97012 MECHANICAL TRACTION THERAPY: CPT | Performed by: PHYSICAL THERAPIST

## 2018-07-17 PROCEDURE — 97162 PT EVAL MOD COMPLEX 30 MIN: CPT | Performed by: PHYSICAL THERAPIST

## 2018-07-17 PROCEDURE — G8982 BODY POS GOAL STATUS: HCPCS | Performed by: PHYSICAL THERAPIST

## 2018-07-17 NOTE — PROGRESS NOTES
PT Evaluation     Today's date: 2018  Patient name: Hi Gale  : 1965  MRN: [id number]  Referring provider: Shazia Saldana DO  Dx:   Encounter Diagnosis     ICD-10-CM    1  Acute pain of left shoulder M25 512    2  Cervical radiculopathy M54 12 Ambulatory referral to Physical Therapy                  Assessment  Impairments: abnormal muscle tone, abnormal or restricted ROM, activity intolerance, impaired physical strength, lacks appropriate home exercise program and pain with function    Assessment details: Tim Andrade II is a 47 y o  male presenting as an outpatient to [place] [address] PT w/ c/o c/s pain w/ LUE radicular symptoms and L shoulder pain  Pt presents w/ increased pain, decreased ROM, decreased strength, hypertonicity of surrounding musculature, and impaired posture significantly limiting pt's functional ability  Pt will benefit from skilled PT services to address the above deficits in order to max function to allow pt to achieve goals in PT  Thank you for the referral of this pt  Understanding of Dx/Px/POC: good   Prognosis: good    Goals  ST  Pain decreased by 25% in 4-6 weeks  2  ROM increased by 25% in 4-6 weeks  3  Strength increased by 1/2 to 1 muscle grade in all deficient muscle groups in 4-6 weeks  LT  Decrease pain to 1-2/10 at worst by d/c   2  Increase ROM to [facility] for all deficient movements by d/c   3  Strength increased to 5 for all deficient muscle groups by d/c   4  IADL performance increased to max function by d/c   5  Recreational performance increased to max function by d/c   6  Posture improved to max level by d/c      Plan  Planned modality interventions: cryotherapy and traction  Other planned modality interventions: other modalities PRN  Planned therapy interventions: ADL retraining, IADL retraining, flexibility, functional ROM exercises, graded exercise, home exercise program, manual therapy, joint mobilization, neuromuscular re-education, patient education, postural training, strengthening, therapeutic exercise and therapeutic activities  Other planned therapy interventions: other interventions PRN  Frequency: 1-2x/week  Duration in weeks: 8  Treatment plan discussed with: patient        Subjective Evaluation    History of Present Illness  Mechanism of injury: Pt reports to IE w/ c/o c/s pain w/ LUE radicular symptoms  Pt was treated w/ skilled PT tx previously for the same issue after doing extended periods of overhead work  Pt reports that he was fine for 3-4 mos following d/c from skilled PT tx, but pain started to return in late May/early   He reports that he saw chiropractor 1x which provided some temporary relief  Pt reports intermittent numbness/parasthesias into lateral LUE  He reports that he has been doing nerve glides which provide him some relief  Pt reports that he is also planning to purchase home traction unit as this provides him w/ the most relief  Pt also recently injured L shoulder  Upon review of PMHx, pt no longer taking Valium, Ativan, or ASA  Pain  Current pain ratin (achey pain)  At worst pain ratin (achey pain w/ parasthesias into LUE)  Location: c/s and lateral LUE  Alleviating factors: Nerve glides; hot shower  Exacerbated by: sleeping at night, c/s rotation, OH activity, lifting  Social Support    Employment status: working (commerical construction )  Hand dominance: right      Diagnostic Tests  X-ray: abnormal (Most recent MRI- "Moderate multilevel cervical degenerative changes as described  No acute osseous abnormality  Degenerative change has progressed since 2006")  Patient Goals  Patient goals for therapy: decreased pain and increased motion          Objective     Active Range of Motion   Cervical/Thoracic Spine   Cervical    Subcranial retraction: Active cervical subcranial retraction: *min discomfort in L c/s psp   WFL   Flexion: 62 degrees Extension: 36 degrees   Left lateral flexion: 30 (*L c/s pain) degrees   Right lateral flexion: 24 degrees   Left rotation: 58 degrees   Right rotation: 47 degrees     Thoracic   Flexion: Active thoracic flexion: 25%   Extension: Active thoracic extension: 25%   Left rotation: Active left thoracic rotation: 75%   Right rotation: Active right thoracic rotation: 25%     General Comments     Cervical/Thoracic Comments  Observation/Posture:   Forward, rounded shoulders w/ fwd head    Palpation: Hypertonicity/Tenderness w/ palpation to b/l UT and c/s psp; pain w/ palpation to anterior shoulder    c/s special tests:   sharp-evangelista: negative  vertebral artery: negative  spurlings: positive LUE parasthesias  Hugo's: positive       Shoulder Special Tests:   Stas Bowen: positive  Neer Impingement: positive  Empty Can: negative   Speed's Tests: negative    Gross Shoulder AROM (L):   Flexion: 52 deg * pain (pt able to achieve 171 deg once through painful arc of motion)  Abduction: 70 deg *pain  IR: 62 deg  ER: 75 deg * pain     Gross Shoulder Strength (L):   Abduction:  4+  IR: 4* pain  ER: 4 *pain        EPOC: 9/11/18  Precautions: None    Daily Treatment Diary     Manual                     Consider IASTM to UT/scap          C/s PROM          GHJ mobs          Shoulder PROM          Total Time              Exercise Diary           UBE - retro          C/s rotations - supine          C/s retraction- supine          T/s rotations - gentle          pec stretch on doorway          Self median nerve glides          Pendulums          Supine AAROM sh flexion          supine sh ER AAROM          Table slides                                                                          Modalities  7/17         CP x 10'           Mech traction 8'+2' setup (20-25#)

## 2018-07-19 ENCOUNTER — OFFICE VISIT (OUTPATIENT)
Dept: PHYSICAL THERAPY | Facility: CLINIC | Age: 53
End: 2018-07-19
Payer: COMMERCIAL

## 2018-07-19 DIAGNOSIS — M54.12 CERVICAL RADICULOPATHY: Primary | ICD-10-CM

## 2018-07-19 DIAGNOSIS — M25.512 ACUTE PAIN OF LEFT SHOULDER: ICD-10-CM

## 2018-07-19 PROCEDURE — 97010 HOT OR COLD PACKS THERAPY: CPT

## 2018-07-19 PROCEDURE — 97140 MANUAL THERAPY 1/> REGIONS: CPT

## 2018-07-19 PROCEDURE — 97112 NEUROMUSCULAR REEDUCATION: CPT

## 2018-07-19 PROCEDURE — 97012 MECHANICAL TRACTION THERAPY: CPT

## 2018-07-19 PROCEDURE — 97110 THERAPEUTIC EXERCISES: CPT

## 2018-07-19 NOTE — PROGRESS NOTES
Daily Note     Today's date: 2018  Patient name: Jordyn Ren  : 1965  MRN: [id number]  Referring provider: Candelario Bonilla DO  Dx:   Encounter Diagnosis     ICD-10-CM    1  Cervical radiculopathy M54 12    2  Acute pain of left shoulder M25 512                   Subjective: Pt reports feeling stiff and sore in neck/ L shoulder  Also reports feeling pain off/on (for the past 6 months) under his sternum/ R side ribs  Notes that he also has an area of pain in his lower R t/s that seems to correspond to the anterior region that is painful  States that the t/s pain does not seem to change even when the anterior pain is gone  Objective: See treatment diary below      Assessment: Initiated above listed POC with good tolerance and verbal cueing for form  Performed manuals noting good tolerance however, noted increased parasthesia into L forearm with c/s extension  Reviewed HEP with no questions/ concerns  Concluded with c/s mechanical traction and CP to L shoulder  Advised pt speak to his PCP regarding anterior rib pain -- pt denies feeling tight in chest/ SOB however, asked pt to go ER if he begins to feel those s/s or pain gets worse  Plan: To monitor and progress as able nv       EPOC: 18  Precautions: None    Daily Treatment Diary     Manual            IASTM to UT/scap AC         C/s PROM AC         GHJ mobs np         Shoulder PROM AC         Total Time 15'             Exercise Diary           UBE - retro 3'          C/s rotations - supine 10x ea          C/s retraction- supine 10"X10         T/s rotations - gentle 1-2" x10 ea         pec stretch on doorway 30"X3 ea          Self median nerve glides hep         Pendulums NV         Supine AAROM sh flexion NV         supine sh ER AAROM NV         Table slides NV                                                                         Modalities          CP x 10'           Mech traction 8'+2' setup (20-25#) 8'+2' set up (20-25#)

## 2018-07-24 ENCOUNTER — OFFICE VISIT (OUTPATIENT)
Dept: PHYSICAL THERAPY | Facility: CLINIC | Age: 53
End: 2018-07-24
Payer: COMMERCIAL

## 2018-07-24 DIAGNOSIS — M25.512 ACUTE PAIN OF LEFT SHOULDER: ICD-10-CM

## 2018-07-24 DIAGNOSIS — M54.12 CERVICAL RADICULOPATHY: Primary | ICD-10-CM

## 2018-07-24 PROCEDURE — 97140 MANUAL THERAPY 1/> REGIONS: CPT | Performed by: PHYSICAL THERAPIST

## 2018-07-24 PROCEDURE — 97012 MECHANICAL TRACTION THERAPY: CPT | Performed by: PHYSICAL THERAPIST

## 2018-07-24 PROCEDURE — 97010 HOT OR COLD PACKS THERAPY: CPT | Performed by: PHYSICAL THERAPIST

## 2018-07-24 PROCEDURE — 97110 THERAPEUTIC EXERCISES: CPT | Performed by: PHYSICAL THERAPIST

## 2018-07-24 NOTE — PROGRESS NOTES
Daily Note     Today's date: 2018  Patient name: Irish Chiang II  : 1965  MRN: [id number]  Referring provider: Theresa Torres DO  Dx:   Encounter Diagnosis     ICD-10-CM    1  Cervical radiculopathy M54 12    2  Acute pain of left shoulder M25 512                   Subjective: Pt denies any change in anterior rib/sternal pain since LV- states that it "comes and goes" for the past couple of years and is not present at this time  He denies SOB when he feels this pain; however strongly encouraged pt to seek medical attention for this condition  Objective: See treatment diary below      Assessment: Tx session tolerated well this visit, but cueing required for form to avoid poor posture and performing exercises into painful ROM  Plan: To monitor and progress as able nv       EPOC: 18  Precautions: None    Daily Treatment Diary     Manual           IASTM to UT/scap; c/s psp AC NP        TPR to c/s psp (supine)  RS        C/s PROM AC RS        tPR to biceps musculature  RS        GHJ mobs np RS        Shoulder PROM AC RS        Total Time 15' 15'            Exercise Diary         UBE - retro 3'  3'       C/s rotations - supine 10x ea  10x ea       C/s retraction- supine 10"X10 NP       T/s rotations - gentle 1-2" x10 ea 1-2" x 10       pec stretch on doorway 30"X3 ea  30"x3 ea (GENTLE)       Self median nerve glides hep HEP       Pendulums NV reviewed       Supine AAROM sh flexion NV 10"x10       supine sh ER AAROM NV NV       Table slides NV Flex 10"x10                                                                 Modalities         CP x 10'           Mech traction 8'+2' setup (20-25#) 8'+2' set up (20-25#) 10'+ 2' set up

## 2018-07-26 ENCOUNTER — APPOINTMENT (OUTPATIENT)
Dept: PHYSICAL THERAPY | Facility: CLINIC | Age: 53
End: 2018-07-26
Payer: COMMERCIAL

## 2018-07-31 ENCOUNTER — OFFICE VISIT (OUTPATIENT)
Dept: PHYSICAL THERAPY | Facility: CLINIC | Age: 53
End: 2018-07-31
Payer: COMMERCIAL

## 2018-07-31 DIAGNOSIS — M25.512 ACUTE PAIN OF LEFT SHOULDER: ICD-10-CM

## 2018-07-31 DIAGNOSIS — M54.12 CERVICAL RADICULOPATHY: Primary | ICD-10-CM

## 2018-07-31 PROCEDURE — 97012 MECHANICAL TRACTION THERAPY: CPT

## 2018-07-31 PROCEDURE — 97140 MANUAL THERAPY 1/> REGIONS: CPT

## 2018-07-31 PROCEDURE — 97110 THERAPEUTIC EXERCISES: CPT

## 2018-07-31 PROCEDURE — 97112 NEUROMUSCULAR REEDUCATION: CPT

## 2018-07-31 NOTE — PROGRESS NOTES
Daily Note     Today's date: 2018  Patient name: Kimberley Mcclain II  : 1965  MRN: [id number]  Referring provider: Adriana Matthew DO  Dx:   Encounter Diagnosis     ICD-10-CM    1  Cervical radiculopathy M54 12    2  Acute pain of left shoulder M25 512                   Subjective: Pt reports his neck is not too back today however, his anterior L shoulder is very sore post work today  Objective: See treatment diary below      Assessment: Progressed TE w/ fair tolerance however, had increased paraesthesia into L hand with table slides - held  Pt notes his L anterior shoulder is very sore today -- performed all shoulder TE within pain tolerance noting improved ER on L post session  Concluded with c/s traction and advised pt to trial ice massage to L biceps tendon tonight post session  Plan: To monitor and progress as able nv       EPOC: 18  Precautions: None    Daily Treatment Diary     Manual          IASTM to UT/scap; c/s psp AC NP np       TPR to c/s psp (supine)  RS AC       C/s PROM AC RS AC       tPR to biceps musculature  RS AC       GHJ mobs np RS resume nv       Shoulder PROM AC RS AC       Total Time 15' 15' 15'           Exercise Diary        UBE - retro 3'  3' 3'      C/s rotations - supine 10x ea  10x ea 10x ea      C/s retraction- supine 10"X10 NP resume nv      T/s rotations - gentle 1-2" x10 ea 1-2" x 10 1-2"x 10      pec stretch on doorway 30"X3 ea  30"x3 ea (GENTLE) 30"X3 (gentle)      Self median nerve glides hep HEP HEP      Pendulums NV reviewed np      Supine AAROM sh flexion NV 10"x10 10"X10      supine sh ER AAROM NV NV 10"X10 (L only)      Table slides NV Flex 10"x10 Flex 10x (held inc  paraesthesia)                                                                Modalities        CP x 10'     home      Mech traction 8'+2' setup (20-25#) 8'+2' set up (20-25#) 10'+ 2' set up 10' +2' set up (25#)

## 2018-08-02 ENCOUNTER — OFFICE VISIT (OUTPATIENT)
Dept: PHYSICAL THERAPY | Facility: CLINIC | Age: 53
End: 2018-08-02
Payer: COMMERCIAL

## 2018-08-02 DIAGNOSIS — M54.12 CERVICAL RADICULOPATHY: Primary | ICD-10-CM

## 2018-08-02 DIAGNOSIS — M25.512 ACUTE PAIN OF LEFT SHOULDER: ICD-10-CM

## 2018-08-02 PROCEDURE — 97140 MANUAL THERAPY 1/> REGIONS: CPT

## 2018-08-02 PROCEDURE — 97012 MECHANICAL TRACTION THERAPY: CPT

## 2018-08-02 PROCEDURE — 97112 NEUROMUSCULAR REEDUCATION: CPT

## 2018-08-02 NOTE — PROGRESS NOTES
Daily Note     Today's date: 2018  Patient name: Henrry Rojas II  : 1965  MRN: [id number]  Referring provider: Sravani Nolan DO  Dx:   Encounter Diagnosis     ICD-10-CM    1  Cervical radiculopathy M54 12    2  Acute pain of left shoulder M25 512                   Subjective: Pt reports his neck is not too bad today however, his anterior L shoulder is very sore post work  Objective: See treatment diary below      Assessment: Progressed TE w/ fair tolerance however, held AAROM flexion today as pt very sore in anterior shoulder  Performed primarily shoulder manuals today with improved comfort and range post  Pt with most pain when stretched into ER  Concluded with c/s traction and performed cfm ice massage to long head of biceps tendon  Pt noted improvement in discomfort with ice massage and is willing to perform at home  Plan: To monitor and progress as able nv       EPOC: 18  Precautions: None    Daily Treatment Diary     Manual   8       IASTM to UT/scap; c/s psp AC NP np np      TPR to c/s psp (supine)  RS AC resume nv if needed      C/s PROM AC RS AC np      tPR to biceps musculature  RS AC AC      GHJ mobs np RS resume nv NV      Shoulder PROM AC RS AC AC      cfm ice massage to L bicep tendon    AC      Total Time 15' 15' 15' 20'           Exercise Diary   8/2     UBE - retro 3'  3' 3' 3'      C/s rotations - supine 10x ea  10x ea 10x ea HEP     C/s retraction- supine 10"X10 NP resume nv 10"x10     T/s rotations - gentle 1-2" x10 ea 1-2" x 10 1-2"x 10 HEP     pec stretch on doorway 30"X3 ea  30"x3 ea (GENTLE) 30"X3 (gentle) HEP     Self median nerve glides hep HEP HEP HEP     Pendulums NV reviewed np np     Supine AAROM sh flexion NV 10"x10 10"X10 Held p!     supine sh ER AAROM NV NV 10"X10 (L only) manual     Table slides NV Flex 10"x10 Flex 10x (held inc  paraesthesia) NP Modalities  7/17 7/19 7/24 7/31 8/2     CP x 10'     home home     Mech traction 8'+2' setup (20-25#) 8'+2' set up (20-25#) 10'+ 2' set up 10' +2' set up (25#) 10' +2' set up (25#)

## 2018-08-07 ENCOUNTER — APPOINTMENT (OUTPATIENT)
Dept: PHYSICAL THERAPY | Facility: CLINIC | Age: 53
End: 2018-08-07
Payer: COMMERCIAL

## 2018-08-09 ENCOUNTER — OFFICE VISIT (OUTPATIENT)
Dept: PHYSICAL THERAPY | Facility: CLINIC | Age: 53
End: 2018-08-09
Payer: COMMERCIAL

## 2018-08-09 DIAGNOSIS — M54.12 CERVICAL RADICULOPATHY: Primary | ICD-10-CM

## 2018-08-09 DIAGNOSIS — M25.512 ACUTE PAIN OF LEFT SHOULDER: ICD-10-CM

## 2018-08-09 PROCEDURE — 97012 MECHANICAL TRACTION THERAPY: CPT

## 2018-08-09 PROCEDURE — 97110 THERAPEUTIC EXERCISES: CPT

## 2018-08-09 PROCEDURE — 97112 NEUROMUSCULAR REEDUCATION: CPT

## 2018-08-09 PROCEDURE — 97140 MANUAL THERAPY 1/> REGIONS: CPT

## 2018-08-09 PROCEDURE — 97010 HOT OR COLD PACKS THERAPY: CPT

## 2018-08-09 NOTE — PROGRESS NOTES
Daily Note     Today's date: 2018  Patient name: Josefina Ribera II  : 1965  MRN: [id number]  Referring provider: Madison Gutierrez DO  Dx:   Encounter Diagnosis     ICD-10-CM    1  Cervical radiculopathy M54 12    2  Acute pain of left shoulder M25 512                   Subjective: Pt reports his neck and hand are much better today however, his anterior L shoulder is very sore post working [facility] most of the week  Objective: See treatment diary below      Assessment: Progressed TE w/ fair tolerance however, noted scapular winging on L with attempted ball on wall exercise  Incorporated TB TE with good tolerance and no obvious wing, Plan to add black burn TE/ scap stab KB TE nv if allison  Plan: To monitor and progress as able nv       EPOC: 18  Precautions: None    Daily Treatment Diary     Manual        IASTM to UT/scap; c/s psp AC NP np np np     TPR to c/s psp (supine)  RS AC resume nv if needed np     C/s PROM AC RS AC np np     tPR to biceps musculature  RS AC AC AC     GHJ mobs np RS resume nv NV RS     Shoulder PROM AC RS AC AC AC     cfm ice massage to L bicep tendon    AC      Total Time 15' 15' 15' 20'  15'         Exercise Diary      UBE - retro 3'  3' 3' 3'  3'    C/s rotations - supine 10x ea  10x ea 10x ea HEP     C/s retraction- supine 10"X10 NP resume nv 10"x10 10"x10    T/s rotations - gentle 1-2" x10 ea 1-2" x 10 1-2"x 10 HEP     pec stretch on doorway 30"X3 ea  30"x3 ea (GENTLE) 30"X3 (gentle) HEP     Self median nerve glides hep HEP HEP HEP     Pendulums NV reviewed np np     Supine AAROM sh flexion NV 10"x10 10"X10 Held p! 5"x 10     supine sh ER AAROM NV NV 10"X10 (L only) manual 10"x10 (L only)    Table slides NV Flex 10"x10 Flex 10x (held inc  paraesthesia) NP 10"x10    TB LPD/ rows     MTB 15x2    scap depressions     10"x10 BTB                                            Modalities   8/ 8    CP x 10' home home 10'    [facility] traction 8'+2' setup (20-25#) 8'+2' set up (20-25#) 10'+ 2' set up 10' +2' set up (25#) 10' +2' set up (25#) 10'+2' set up (25#)

## 2018-08-14 ENCOUNTER — OFFICE VISIT (OUTPATIENT)
Dept: PHYSICAL THERAPY | Facility: CLINIC | Age: 53
End: 2018-08-14
Payer: COMMERCIAL

## 2018-08-14 DIAGNOSIS — M25.512 ACUTE PAIN OF LEFT SHOULDER: ICD-10-CM

## 2018-08-14 DIAGNOSIS — M54.12 CERVICAL RADICULOPATHY: Primary | ICD-10-CM

## 2018-08-14 PROCEDURE — 97012 MECHANICAL TRACTION THERAPY: CPT

## 2018-08-14 PROCEDURE — 97140 MANUAL THERAPY 1/> REGIONS: CPT

## 2018-08-14 PROCEDURE — 97112 NEUROMUSCULAR REEDUCATION: CPT

## 2018-08-14 NOTE — PROGRESS NOTES
Daily Note     Today's date: 2018  Patient name: Brittany Leon II  : 1965  MRN: [id number]  Referring provider: Paul Luna DO  Dx:   Encounter Diagnosis     ICD-10-CM    1  Cervical radiculopathy M54 12    2  Acute pain of left shoulder M25 512                   Subjective: Pt reports his neck and hand are much better today however, his anterior L shoulder is very sore  Pt notes very little tingling into his L arm recently  Objective: See treatment diary below      Assessment: Progressed TE today with CHRISTIAN staidris and black burns TE - no pain noted however, fatigue in shoulder blade  Performed PROM noting pain past mid range flex/ abd/ er/ ir today  Advised pt contact MD to further address anterior shoulder pain as he states he has never had an official diagnosis he just looked up RTC tear on the internet  Plan: To monitor and progress as able nv       EPOC: 18  Precautions: None    Daily Treatment Diary     Manual       IASTM to UT/scap; c/s psp AC NP np np np np    TPR to c/s psp (supine)  RS AC resume nv if needed np AC    C/s PROM AC RS AC np np np    tPR to biceps musculature  RS AC AC AC np    GHJ mobs np RS resume nv NV RS np    Shoulder PROM AC RS AC AC AC AC    cfm ice massage to L bicep tendon    AC  np    Total Time 15' 15' 15' 20'  15' 12'         Exercise Diary     UBE - retro 3'  3' 3' 3'  3' def   C/s rotations - supine 10x ea  10x ea 10x ea HEP  HEP   C/s retraction- supine 10"X10 NP resume nv 10"x10 10"x10 10"X10   T/s rotations - gentle 1-2" x10 ea 1-2" x 10 1-2"x 10 HEP  10"X10   pec stretch on doorway 30"X3 ea  30"x3 ea (GENTLE) 30"X3 (gentle) HEP  HEP   Self median nerve glides hep HEP HEP HEP  HEP   Supine AAROM sh flexion NV 10"x10 10"X10 Held p! 5"x 10  HEP   supine sh ER AAROM NV NV 10"X10 (L only) manual 10"x10 (L only)    Table slides NV Flex 10"x10 Flex 10x (held inc  paraesthesia) NP 10"x10 np TB LPD/ rows     MTB 15x2 resume env   scap depressions     10"x10 BTB resume nv   KB stab      (5#) 30"x3 (L)   Black moyer ext/ MT (L only)      (3#/0#)   20x/ 10x    Black burn rows  (L only)      (5#)  20x                 Modalities  7/17 7/19 7/24 7/31 8/2 8/9 8/14   CP x 10'     home home 10' def   Mech traction 8'+2' setup (20-25#) 8'+2' set up (20-25#) 10'+ 2' set up 10' +2' set up (25#) 10' +2' set up (25#) 10'+2' set up (25#) 10' +2' set up (25#)   20% pulsed US   L ant shoulder       6'+2' set up

## 2018-08-16 ENCOUNTER — OFFICE VISIT (OUTPATIENT)
Dept: PHYSICAL THERAPY | Facility: CLINIC | Age: 53
End: 2018-08-16
Payer: COMMERCIAL

## 2018-08-16 DIAGNOSIS — M25.512 ACUTE PAIN OF LEFT SHOULDER: ICD-10-CM

## 2018-08-16 DIAGNOSIS — M54.12 CERVICAL RADICULOPATHY: Primary | ICD-10-CM

## 2018-08-16 PROCEDURE — 97035 APP MDLTY 1+ULTRASOUND EA 15: CPT | Performed by: PHYSICAL THERAPIST

## 2018-08-16 PROCEDURE — 97140 MANUAL THERAPY 1/> REGIONS: CPT | Performed by: PHYSICAL THERAPIST

## 2018-08-16 PROCEDURE — 97012 MECHANICAL TRACTION THERAPY: CPT | Performed by: PHYSICAL THERAPIST

## 2018-08-16 NOTE — PROGRESS NOTES
Daily Note     Today's date: 2018  Patient name: Abimael Pittman II  : 1965  MRN: [id number]  Referring provider: Jacki Boo DO  Dx: s  Encounter Diagnosis     ICD-10-CM    1  Cervical radiculopathy M54 12    2  Acute pain of left shoulder M25 512                   Subjective: Pt reports that parasthesias continue to remain improved  Additionally, he reports shoulder pain may have also been slightly improved this AM   However, he reports that pain is still limiting and he plans to see PCP regarding shoulder  Objective: See treatment diary below      Assessment: Tx session tolerated well today w/ good tolerance for L shoulder PROm  Plan: To monitor and progress as able nv       EPOC: 18  Precautions: None    Daily Treatment Diary     Manual      IASTM to UT/scap; c/s psp np np np NP   TPR to c/s psp (supine) resume nv if needed np AC RS   C/s PROM np np np np- resume nv   tPR to biceps musculature AC AC np NP   GHJ mobs NV RS np RS   Shoulder PROM AC AC AC RS   cfm ice massage to L bicep tendon AC  np NP   Total Time 20'  15' 12'  21'       Exercise Diary     UBE - retro 3'  3' def 3'   C/s rotations - supine HEP  HEP HEP   C/s retraction- supine 10"x10 10"x10 10"X10 HEP   T/s rotations - gentle HEP  10"X10 HEP   pec stretch on doorway HEP  HEP    Self median nerve glides HEP  HEP    Supine AAROM sh flexion Held p! 5"x 10  HEP    supine sh ER AAROM manual 10"x10 (L only)  NP-    Table slides NP 10"x10 np np   TB LPD/ rows  MTB 15x2 resume env NP- resume nv   scap depressions  10"x10 BTB resume nv NP- resume nv   KB stab   (5#) 30"x3 (L) 5# 30"x3 L   Black moyer ext/ MT (L only)- standing over table   (3#/0#)   20x/ 10x  NP- resume NV   Black burn rows  (L only)- standing over table   (5#)  20x  NP- resume nv              Modalities     CP x 10'  home 10' def np   Mech traction 10' +2' set up (25#) 10'+2' set up (25#) 10' +2' set up (25#) 12'+2' setup (25#)   20% pulsed US   L ant shoulder   6'+2' set up 6'+2' setup

## 2018-08-21 ENCOUNTER — OFFICE VISIT (OUTPATIENT)
Dept: PHYSICAL THERAPY | Facility: CLINIC | Age: 53
End: 2018-08-21
Payer: COMMERCIAL

## 2018-08-21 DIAGNOSIS — M25.512 ACUTE PAIN OF LEFT SHOULDER: Primary | ICD-10-CM

## 2018-08-21 DIAGNOSIS — M54.12 CERVICAL RADICULOPATHY: ICD-10-CM

## 2018-08-21 PROCEDURE — 97012 MECHANICAL TRACTION THERAPY: CPT

## 2018-08-21 PROCEDURE — 97035 APP MDLTY 1+ULTRASOUND EA 15: CPT

## 2018-08-21 PROCEDURE — 97140 MANUAL THERAPY 1/> REGIONS: CPT

## 2018-08-21 NOTE — PROGRESS NOTES
Daily Note     Today's date: 2018  Patient name: Agustín Hood II  : 1965  MRN: [id number]  Referring provider: Eloisa Gamez DO  Dx:   Encounter Diagnosis     ICD-10-CM    1  Acute pain of left shoulder M25 512    2  Cervical radiculopathy M54 12                   Subjective: Pt reports having shld soreness t/o the day stating he just deals with it   "I think I have to go to an orthopaedic Dr "  Reports he does get increased ROM the next day post RX  Objective: See treatment diary below      Assessment: Tolerated treatment well  Patient would benefit from continued PT      Plan: Continue per plan of care  Progress treatment as tolerated      EPOC: 18  Precautions: None    Daily Treatment Diary     Manual          IASTM to UT/scap; c/s psp np np np NP MFR  JK       TPR to c/s psp (supine) resume nv if needed np AC RS JK       C/s PROM np np np np- resume nv        tPR to biceps musculature AC AC np NP        GHJ mobs NV RS np RS KK       Shoulder PROM AC AC AC RS JK       cfm ice massage to L bicep tendon AC  np NP MFR  JK       Total Time 20'  15' 12'  21' 25'           Exercise Diary         UBE - retro 3'  3' def 3' 3'       C/s rotations - supine HEP  HEP HEP        C/s retraction- supine 10"x10 10"x10 10"X10 HEP        T/s rotations - gentle HEP  10"X10 HEP        pec stretch on doorway HEP  HEP         Self median nerve glides HEP  HEP         Supine AAROM sh flexion Held p! 5"x 10  HEP         supine sh ER AAROM manual 10"x10 (L only)  NP-         Table slides NP 10"x10 np np        TB LPD/ rows  MTB 15x2 resume env NP- resume nv        scap depressions  10"x10 BTB resume nv NP- resume nv        KB stab   (5#) 30"x3 (L) 5# 30"x3 L        Black moyer ext/ MT (L only)- standing over table   (3#/0#)   20x/ 10x  NP- resume NV        Black burn rows  (L only)- standing over table   (5#)  20x  NP- resume nv Modalities  8/2 8/9 8/14 8/16 8/21       CP x 10'  home 10' def np        Mech traction 10' +2' set up (25#) 10'+2' set up (25#) 10' +2' set up (25#) 12'+2' setup (25#) 25#  12'+2'       20% pulsed US   L ant shoulder   6'+2' set up 6'+2' setup 6'+2'

## 2018-08-23 ENCOUNTER — OFFICE VISIT (OUTPATIENT)
Dept: PHYSICAL THERAPY | Facility: CLINIC | Age: 53
End: 2018-08-23
Payer: COMMERCIAL

## 2018-08-23 DIAGNOSIS — M25.512 ACUTE PAIN OF LEFT SHOULDER: Primary | ICD-10-CM

## 2018-08-23 DIAGNOSIS — M54.12 CERVICAL RADICULOPATHY: ICD-10-CM

## 2018-08-23 PROCEDURE — G8981 BODY POS CURRENT STATUS: HCPCS

## 2018-08-23 PROCEDURE — 97140 MANUAL THERAPY 1/> REGIONS: CPT

## 2018-08-23 PROCEDURE — 97012 MECHANICAL TRACTION THERAPY: CPT

## 2018-08-23 PROCEDURE — G8982 BODY POS GOAL STATUS: HCPCS

## 2018-08-23 NOTE — PROGRESS NOTES
Daily Note     Today's date: 2018  Patient name: Troy Lindquist  : 1965  MRN: [id number]  Referring provider: Tim Portillo DO  Dx:   Encounter Diagnosis     ICD-10-CM    1  Acute pain of left shoulder M25 512    2  Cervical radiculopathy M54 12                   Subjective: Pt states that he still has not called an orthopaedic, but that he will tomorrow  He reports continued shoulder pain  Objective: See treatment diary below      Assessment: Tolerated treatment well  Pt requested just manual therapy and traction this session  Decreased tightness noted after manual therapy  Pain especially noted with PROM abd  Patient would benefit from continued PT      Plan: Continue per plan of care  Progress treatment as tolerated      EPOC: 18  Precautions: None    Daily Treatment Diary     Manual         IASTM to UT/scap; c/s psp np np np NP MFR  JK STM  TH      TPR to c/s psp (supine) resume nv if needed np AC RS JK TH      C/s PROM np np np np- resume nv        tPR to biceps musculature AC AC np NP        GHJ mobs NV RS np RS KK NP      Shoulder PROM AC AC AC RS JK TH      cfm ice massage to L bicep tendon AC  np NP MFR  JK MFR  TH      Total Time 20'  15' 12'  21' 25' 25'          Exercise Diary        UBE - retro 3'  3' def 3' 3'       C/s rotations - supine HEP  HEP HEP        C/s retraction- supine 10"x10 10"x10 10"X10 HEP        T/s rotations - gentle HEP  10"X10 HEP        pec stretch on doorway HEP  HEP         Self median nerve glides HEP  HEP         Supine AAROM sh flexion Held p! 5"x 10  HEP         supine sh ER AAROM manual 10"x10 (L only)  NP-         Table slides NP 10"x10 np np        TB LPD/ rows  MTB 15x2 resume env NP- resume nv        scap depressions  10"x10 BTB resume nv NP- resume nv        KB stab   (5#) 30"x3 (L) 5# 30"x3 L        Black moyer ext/ MT (L only)- standing over table   (3#/0#)   20x/ 10x  NP- resume NV        Black burn rows  (L only)- standing over table   (5#)  20x  NP- resume nv                        Modalities  8/2 8/9 8/14 8/16 8/21 8/23      CP x 10'  home 10' def np        Mech traction 10' +2' set up (25#) 10'+2' set up (25#) 10' +2' set up (25#) 12'+2' setup (25#) 25#  12'+2' 25#  12'+2      20% pulsed US   L ant shoulder   6'+2' set up 6'+2' setup 6'+2' 6'+2'

## 2018-08-28 ENCOUNTER — OFFICE VISIT (OUTPATIENT)
Dept: PHYSICAL THERAPY | Facility: CLINIC | Age: 53
End: 2018-08-28
Payer: COMMERCIAL

## 2018-08-28 DIAGNOSIS — M25.512 ACUTE PAIN OF LEFT SHOULDER: Primary | ICD-10-CM

## 2018-08-28 DIAGNOSIS — M54.12 CERVICAL RADICULOPATHY: ICD-10-CM

## 2018-08-28 PROCEDURE — 97012 MECHANICAL TRACTION THERAPY: CPT

## 2018-08-28 PROCEDURE — 97140 MANUAL THERAPY 1/> REGIONS: CPT

## 2018-08-28 NOTE — PROGRESS NOTES
Daily Note     Today's date: 2018  Patient name: Shakira Jacobs II  : 1965  MRN: [id number]  Referring provider: Star Brooks DO  Dx:   Encounter Diagnosis     ICD-10-CM    1  Acute pain of left shoulder M25 512    2  Cervical radiculopathy M54 12                   Subjective: Pt states that he still has not called an orthopaedic, but that he will when he gets a chance  He reports continued shoulder pain  Objective: See treatment diary below      Assessment: Tolerated treatment well  Pt had good tolerance to LV and requested manuals and traction again  He noted that he has the most relief with traction  Limitations noted with all directions in his L shoulder noting a tight end feel  Patient would benefit from continued PT      Plan: Continue per plan of care  Progress treatment as tolerated      EPOC: 18  Precautions: None    Daily Treatment Diary     Manual        IASTM to UT/scap; c/s psp np np np NP MFR  JK STM  TH STM   MM     TPR to c/s psp (supine) resume nv if needed np AC RS JK TH MM     C/s PROM np np np np- resume nv        tPR to biceps musculature AC AC np NP        GHJ mobs NV RS np RS KK NP NP     Shoulder PROM AC AC AC RS JK TH MM     cfm ice massage to L bicep tendon AC  np NP MFR  JK MFR  TH MFR  MM     Total Time 20'  15' 12'  21' 25' 25' 25'         Exercise Diary       UBE - retro 3'  3' def 3' 3'       C/s rotations - supine HEP  HEP HEP        C/s retraction- supine 10"x10 10"x10 10"X10 HEP        T/s rotations - gentle HEP  10"X10 HEP        pec stretch on doorway HEP  HEP         Self median nerve glides HEP  HEP         Supine AAROM sh flexion Held p! 5"x 10  HEP         supine sh ER AAROM manual 10"x10 (L only)  NP-         Table slides NP 10"x10 np np        TB LPD/ rows  MTB 15x2 resume env NP- resume nv        scap depressions  10"x10 BTB resume nv NP- resume nv        KB stab   (5#) 30"x3 (L) 5# 30"x3 L        Black moyer ext/ MT (L only)- standing over table   (3#/0#)   20x/ 10x  NP- resume NV        Black burn rows  (L only)- standing over table   (5#)  20x  NP- resume nv                        Modalities  8/2 8/9 8/14 8/16 8/21 8/23 8/28     CP x 10'  home 10' def np        Mech traction 10' +2' set up (25#) 10'+2' set up (25#) 10' +2' set up (25#) 12'+2' setup (25#) 25#  12'+2' 25#  12'+2 25# 12' +2'     20% pulsed US   L ant shoulder   6'+2' set up 6'+2' setup 6'+2' 6'+2' 6'+2'

## 2018-08-30 ENCOUNTER — APPOINTMENT (OUTPATIENT)
Dept: PHYSICAL THERAPY | Facility: CLINIC | Age: 53
End: 2018-08-30
Payer: COMMERCIAL

## 2018-09-04 ENCOUNTER — APPOINTMENT (OUTPATIENT)
Dept: PHYSICAL THERAPY | Facility: CLINIC | Age: 53
End: 2018-09-04
Payer: COMMERCIAL

## 2018-09-11 ENCOUNTER — EVALUATION (OUTPATIENT)
Dept: PHYSICAL THERAPY | Facility: CLINIC | Age: 53
End: 2018-09-11
Payer: COMMERCIAL

## 2018-09-11 DIAGNOSIS — M54.12 CERVICAL RADICULOPATHY: Primary | ICD-10-CM

## 2018-09-11 DIAGNOSIS — M25.512 ACUTE PAIN OF LEFT SHOULDER: ICD-10-CM

## 2018-09-11 PROCEDURE — 97140 MANUAL THERAPY 1/> REGIONS: CPT | Performed by: PHYSICAL THERAPIST

## 2018-09-11 PROCEDURE — G8983 BODY POS D/C STATUS: HCPCS | Performed by: PHYSICAL THERAPIST

## 2018-09-11 PROCEDURE — G8982 BODY POS GOAL STATUS: HCPCS | Performed by: PHYSICAL THERAPIST

## 2018-09-11 PROCEDURE — 97035 APP MDLTY 1+ULTRASOUND EA 15: CPT | Performed by: PHYSICAL THERAPIST

## 2018-09-11 PROCEDURE — 97012 MECHANICAL TRACTION THERAPY: CPT | Performed by: PHYSICAL THERAPIST

## 2018-09-11 PROCEDURE — 97010 HOT OR COLD PACKS THERAPY: CPT | Performed by: PHYSICAL THERAPIST

## 2018-09-11 NOTE — PROGRESS NOTES
PT Discharge    Today's date: 2018  Patient name: Ru Keith  : 1965  MRN: [id number]  Referring provider: Denece Lombard, DO  Dx:   Encounter Diagnosis     ICD-10-CM    1  Cervical radiculopathy M54 12    2  Acute pain of left shoulder M25 512                   Assessment  Impairments: abnormal or restricted ROM, activity intolerance, impaired physical strength and pain with function    Assessment details: Daniel Sun II is a 47 y o  Male being treated as an outpatient at  [facility] PT w/ initial c/o c/s pain w/ LUE radicular symptoms and L shoulder pain  Since IE, pt has made strong and steady gains in pain reduction related to c/s/L lateral UE radicular symptoms/parasthesias, c/s ROM, and activity tolerance (related to c/s and radicular symptoms/parasthesias) and is ready for d/c to updated HEP regarding this initial complaint  However, pt has not made progress regarding L shoulder ROM which seems to be related to possible biceps injury unrelated to c/s  Due to lack of progress in this area, will continue w/ plan to d/c from skilled PT services as further medical assessment/intervention is recommended for this condition  Thank you  Understanding of Dx/Px/POC: good   Prognosis: good    Goals  ST  Pain decreased by 25% in 4-6 weeks  - Partially Met (c/s and radicular pain)  2  ROM increased by 25% in 4-6 weeks  Partially Met (c/s)  3  Strength increased by 1/2 to 1 muscle grade in all deficient muscle groups in 4-6 weeks  (Partially Met)    LT  Decrease pain to 1-2/10 at worst by d/c  Not Met  2  Increase ROM to [facility] for all deficient movements by d/c  Partially Met for c/s     3  Strength increased to 5 for all deficient muscle groups by d/c  Not Met  4  IADL performance increased to max function by d/c  Partially Met for c/s/radicular symptoms  5  Recreational performance increased to max function by d/c  Partially Met for c/s/radicular symptoms  6   Posture improved to max level by d/c  Partially met    Plan  Planned therapy interventions: home exercise program  Other planned therapy interventions: other interventions PRN  Frequency: D/c to updated HEP  Treatment plan discussed with: patient        Subjective Evaluation    History of Present Illness  Mechanism of injury: CURRENT LEVEL:   Pt reports c/s pain has been significantly improved since IE  Pt reports that he continues to have stiffness, but radicular symptoms have not been present for the past couple of weeks  No limitations in regards to c/s and initial c/o radicular pain  However, pt continues to have pain in L anterior shoulder (area of LHB insertion) which seems to be an unrelated issue and not improving much w/ skilled PT tx  PREVIOUS LEVEL:  Pt reports to IE w/ c/o c/s pain w/ LUE radicular symptoms  Pt was treated w/ skilled PT tx previously for the same issue after doing extended periods of overhead work  Pt reports that he was fine for 3-4 mos following d/c from skilled PT tx, but pain started to return in late May/early June  He reports that he saw chiropractor 1x which provided some temporary relief  Pt reports intermittent numbness/parasthesias into lateral LUE  He reports that he has been doing nerve glides which provide him some relief  Pt reports that he is also planning to purchase home traction unit as this provides him w/ the most relief  Pt also recently injured L shoulder  Upon review of PMHx, pt no longer taking Valium, Ativan, or ASA  Pain  Pain scale: c/s and lateral UE: 0/10; L ant shoulder: 3-4/10  Pain scale at highest: c/s and lateral LUE- 4/10 "stiffness" in c/s, but no radicular symptoms; L ant shoulder: 4-5/10  Location: c/s and lateral LUE  Alleviating factors: Nerve glides; hot shower  Exacerbated by: c/s and LUE radicular sypmtoms: nothing; L shoulder,-repetitive use of LUE, especially OH, OH lifting, intermittently w/ sleeping      Social Support    Employment status: working ([employer] )  Hand dominance: right      Diagnostic Tests  X-ray: abnormal (Most recent MRI- "Moderate multilevel cervical degenerative changes as described  No acute osseous abnormality  Degenerative change has progressed since January 2006")  Patient Goals  Patient goals for therapy: decreased pain and increased motion          Objective     Active Range of Motion   Cervical/Thoracic Spine   Cervical    Subcranial retraction: WFL   Flexion: 65 degrees   Extension: 42 degrees   Left lateral flexion: 30 degrees   Right lateral flexion: 31 degrees   Left rotation: 58 degrees   Right rotation: 66 degrees     General Comments     Cervical/Thoracic Comments  Observation/Posture:   Forward, rounded shoulders w/ fwd head     Palpation: pain w/ palpation to anterior shoulder/LHB tendon insertion    Shoulder Special Tests:   Vera Shore: negative  Empty Can: negative   Speed's Tests: positive    *Pain in anterior shoulder also w/ resisted elbow flexion    Gross Shoulder AROM (L):   Flexion: 60 deg * pain   Abduction: 70 deg *pain  IR: 58 deg  ER: 76 deg     Seated Flexion against gravity: 141 deg  Seated Abduction against gravity: 160 deg    Gross Shoulder Strength (L):   Flexion: 4+  Abduction:  4+  IR: 4+  ER: 4+      Flowsheet Rows      Most Recent Value   PT/OT G-Codes   Current Score  61   Projected Score  72      EPOC: 9/11/18  Precautions: None    Daily Treatment Diary     Manual  8/23 8/28 9/11     IASTM to UT/scap; c/s psp STM  TH STM   MM STM RS    TPR to c/s psp (supine) TH MM NP    C/s PROM   NP    tPR to biceps musculature   NP    GHJ mobs NP NP RS    Shoulder PROM TH MM RS    cfm ice massage to L bicep tendon MFR  TH MFR  MM NP    Progress Note   RS    Total Time 25' 25' 20'        Exercise Diary  8/23 8/28 9/11    UBE - retro   3/3'    T/s rotations - gentle   1-2"x10    pec stretch on doorway   30"x3    Self median nerve glides   HEP    Supine AAROM sh flexion   HEP    supine sh ER AAROM   HEP    Table slides   HEP    TB LPD/ rows   NP    scap depressions   NP    KB stab   NP    Black moyer ext/ MT (L only)- standing over table   HEP    Black burn rows  (L only)- standing over table   HEP               Modalities  8/23 8/28 9/11    CP x 10'    10'    Mech traction 25#  12'+2 25# 15' +2' 22# 10'+2' set up    20% pulsed US   L ant shoulder 6'+2' 6'+2' 6'+2' set up

## 2018-09-13 ENCOUNTER — APPOINTMENT (OUTPATIENT)
Dept: PHYSICAL THERAPY | Facility: CLINIC | Age: 53
End: 2018-09-13
Payer: COMMERCIAL

## 2019-01-02 ENCOUNTER — OFFICE VISIT (OUTPATIENT)
Dept: FAMILY MEDICINE CLINIC | Facility: HOSPITAL | Age: 54
End: 2019-01-02
Payer: COMMERCIAL

## 2019-01-02 VITALS
TEMPERATURE: 97.7 F | DIASTOLIC BLOOD PRESSURE: 90 MMHG | WEIGHT: 218.2 LBS | HEART RATE: 85 BPM | SYSTOLIC BLOOD PRESSURE: 138 MMHG | HEIGHT: 71 IN | BODY MASS INDEX: 30.55 KG/M2

## 2019-01-02 DIAGNOSIS — E78.1 ESSENTIAL HYPERTRIGLYCERIDEMIA: ICD-10-CM

## 2019-01-02 DIAGNOSIS — M50.90 CERVICAL NECK PAIN WITH EVIDENCE OF DISC DISEASE: Primary | ICD-10-CM

## 2019-01-02 DIAGNOSIS — Z00.00 ANNUAL PHYSICAL EXAM: ICD-10-CM

## 2019-01-02 DIAGNOSIS — M54.12 CERVICAL RADICULOPATHY: ICD-10-CM

## 2019-01-02 DIAGNOSIS — G47.00 INSOMNIA, UNSPECIFIED TYPE: ICD-10-CM

## 2019-01-02 DIAGNOSIS — M47.812 OSTEOARTHRITIS OF CERVICAL SPINE, UNSPECIFIED SPINAL OSTEOARTHRITIS COMPLICATION STATUS: ICD-10-CM

## 2019-01-02 PROCEDURE — 99214 OFFICE O/P EST MOD 30 MIN: CPT | Performed by: FAMILY MEDICINE

## 2019-01-02 PROCEDURE — 99396 PREV VISIT EST AGE 40-64: CPT | Performed by: FAMILY MEDICINE

## 2019-01-02 RX ORDER — LORAZEPAM 1 MG/1
1 TABLET ORAL EVERY 8 HOURS PRN
Qty: 30 TABLET | Refills: 0 | Status: SHIPPED | OUTPATIENT
Start: 2019-01-02 | End: 2019-06-19

## 2019-01-02 RX ORDER — DIAZEPAM 10 MG/1
10 TABLET ORAL EVERY 8 HOURS PRN
Qty: 30 TABLET | Refills: 0 | Status: SHIPPED | OUTPATIENT
Start: 2019-01-02 | End: 2019-04-26

## 2019-01-03 NOTE — PATIENT INSTRUCTIONS

## 2019-01-03 NOTE — PROGRESS NOTES
ADULT ANNUAL PHYSICAL  [facility] Physician Jarad Whitten MD    NAME: Shelley García II  AGE: 47 y o  SEX: male  : 1965     DATE: 2019     Assessment and Plan:     Problem List Items Addressed This Visit        Nervous and Auditory    Cervical radiculopathy    Relevant Orders    Ambulatory referral to Neurosurgery       Musculoskeletal and Integument    Osteoarthritis of cervical spine       Other    Essential hypertriglyceridemia    Insomnia    Relevant Medications    LORazepam (ATIVAN) 1 mg tablet      Other Visit Diagnoses     Cervical neck pain with evidence of disc disease    -  Primary    Relevant Medications    diazepam (VALIUM) 10 mg tablet    Other Relevant Orders    MRI cervical spine wo contrast    Ambulatory referral to Neurosurgery          Health maintenance and preventative care screenings were discussed with patient today  Appropriate education was printed on patient's after visit summary  · Discussed risks/benefits of screening for high cholesterol and diabetes  Patient agrees to screening for high cholesterol and diabetes  · Immunizations were reviewed: patient declines influenza vaccine  Td update today    Counseling:  Dental Health: discussed importance of regular tooth brushing, flossing, and dental visits  · BMI Counseling: Body mass index is 30 43 kg/m²  Discussed the patient's BMI with him  The BMI is above average  BMI counseling and education was provided to the patient  Nutrition recommendations include reducing portion sizes, decreasing overall calorie intake, 3-5 servings of fruits/vegetables daily, decreasing soda and/or juice intake, moderation in carbohydrate intake and increasing intake of lean protein  Exercise recommendations include exercising 3-5 times per week  No Follow-up on file       Chief Complaint:     Chief Complaint   Patient presents with    Back Pain     Upper left     Insomnia      History of Present Illness:     Adult Annual Physical   Patient here for a comprehensive physical exam  The patient reports see separate office visit  Diet and Physical Activity  · Diet/Nutrition: limited junk food and consuming 3-5 servings of fruits/vegetables daily  · Weight concerns: patient has class 1 obesity (BMI 30-34 9)  · Exercise: no formal exercise  Depression Screening  PHQ-9 Depression Screening    PHQ-9:    Frequency of the following problems over the past two weeks:            General Health  · Sleep: sleeps poorly  · Hearing: normal - bilateral   · Vision: no vision problems  · Dental: regular dental visits   Health  · Erectile dysfunction: yes  ·      Review of Systems:     Review of Systems   Constitutional: Negative  Negative for activity change, appetite change, chills and diaphoresis  HENT: Negative for congestion and dental problem  Respiratory: Negative  Negative for apnea, chest tightness, shortness of breath and wheezing  Cardiovascular: Negative  Negative for chest pain, palpitations and leg swelling  Gastrointestinal: Negative  Negative for abdominal distention, abdominal pain, constipation, diarrhea and nausea  Genitourinary: Positive for frequency  Negative for difficulty urinating and dysuria  Past Medical History:     History reviewed  No pertinent past medical history  Past Surgical History:     History reviewed  No pertinent surgical history  Social History:     Social History     Social History    Marital status: /Civil Union     Spouse name: N/A    Number of children: N/A    Years of education: N/A     Social History Main Topics    Smoking status: Never Smoker    Smokeless tobacco: Never Used    Alcohol use No    Drug use: No    Sexual activity: Not Asked     Other Topics Concern    None     Social History Narrative    None      Family History:     History reviewed  No pertinent family history     Current Medications:     Current Outpatient Prescriptions Medication Sig Dispense Refill    aspirin 81 MG tablet Take 1 tablet by mouth daily      Misc Natural Products (GLUCOSAMINE CHONDROITIN ADV PO) Take 1 tablet by mouth daily      sildenafil (VIAGRA) 100 mg tablet Take 0 5-1 tablets ( mg total) by mouth as needed for erectile dysfunction 10 tablet 0    diazepam (VALIUM) 10 mg tablet Take 1 tablet (10 mg total) by mouth every 8 (eight) hours as needed for muscle spasms 30 tablet 0    LORazepam (ATIVAN) 1 mg tablet Take 1 tablet (1 mg total) by mouth every 8 (eight) hours as needed for anxiety or sleep 30 tablet 0     No current facility-administered medications for this visit  Allergies:     No Known Allergies   Objective:     /90   Pulse 85   Temp 97 7 °F (36 5 °C)   Ht 5' 11" (1 803 m)   Wt 99 kg (218 lb 3 2 oz)   BMI 30 43 kg/m²     Physical Exam   Constitutional: He is oriented to person, place, and time  He appears well-developed and well-nourished  No distress  HENT:   Head: Normocephalic and atraumatic  Right Ear: External ear normal    Left Ear: External ear normal    Nose: Nose normal    Mouth/Throat: Oropharynx is clear and moist  No oropharyngeal exudate  Eyes: Pupils are equal, round, and reactive to light  Conjunctivae and EOM are normal  Right eye exhibits no discharge  Left eye exhibits no discharge  No scleral icterus  Neck: Normal range of motion  Neck supple  No JVD present  No tracheal deviation present  No thyromegaly present  Cardiovascular: Normal rate, regular rhythm and normal heart sounds  Exam reveals no gallop and no friction rub  No murmur heard  Pulmonary/Chest: Effort normal and breath sounds normal  No stridor  No respiratory distress  He has no wheezes  He has no rales  He exhibits no tenderness  Abdominal: Soft  Bowel sounds are normal  He exhibits no distension  There is no tenderness  There is no rebound and no guarding  No hernia     Genitourinary: Prostate normal and penis normal  No penile tenderness  Lymphadenopathy:     He has no cervical adenopathy  Neurological: He is alert and oriented to person, place, and time  No cranial nerve deficit  Skin: Skin is warm and dry  He is not diaphoretic  No erythema  Psychiatric: He has a normal mood and affect  His speech is normal and behavior is normal  Judgment and thought content normal  He is not actively hallucinating  Cognition and memory are normal  He is attentive  Nursing note and vitals reviewed         Health Maintenance:     Health Maintenance   Topic Date Due    CRC Screening: Colonoscopy  1965    INFLUENZA VACCINE  07/01/2018    Depression Screening PHQ  07/17/2019    DTaP,Tdap,and Td Vaccines (2 - Td) 09/20/2026     Immunization History   Administered Date(s) Administered    Tdap 09/20/2016       MD Megan Storm MD

## 2019-01-03 NOTE — PROGRESS NOTES
ASSESSMENT/PLAN:    Problem List Items Addressed This Visit        Nervous and Auditory    Cervical radiculopathy    Relevant Orders    Ambulatory referral to Neurosurgery       Other    Essential hypertriglyceridemia    Insomnia    Relevant Medications    LORazepam (ATIVAN) 1 mg tablet      Other Visit Diagnoses     Cervical neck pain with evidence of disc disease    -  Primary    Relevant Medications    diazepam (VALIUM) 10 mg tablet    Other Relevant Orders    MRI cervical spine wo contrast    Ambulatory referral to Neurosurgery          With cervical radiculopathy  Xrays showing DJD  Worsening sytmpoms  Ongoing treatment with physical therapy  Advised MRI of the cervical spine  Once this is completed will refer to neurosurgery/spine surgery  Patient given Valium to help with spasm that are associated with his neck  Knows not to use along with lorazepam      Insomnia  Okay to use lorazepam sparingly  Usually due to cervical neck pain  Elevated triglycerides  Stable  Continue with dietary control  Discussed se and ar to include dependence and addiction with use of benzodiazepines  No Follow-up on file  To call our office if any concerns/questions at [phone]   ______________________________________________________________________          Patient is here for follow up of chronic conditions  Chief Complaint   Patient presents with    Back Pain     Upper left     Insomnia       History of Present Illness:     Here for  1  Insomnia  Usually related to cervical neck pain  Requesting refill of lorazepam  Has used this sparingly  Has been a while since the last prescriptions  2  Neck pain  Has been going to PT  Has not seen spine surgery or pain mgt  Had xrays done  Mri has not been done  Pain in the neck radiating to the upper extremities  Has associated weakness of the left upper extremity  Requesting a reill of valium which he also uses sparingly for neck muscle spasms  3  Hyperlipidemia  Had labs a few months ago   He has had acceptable cholesterol levels  Review of Systems   Constitutional: Negative  Negative for activity change, appetite change, chills and diaphoresis  HENT: Negative for congestion and dental problem  Respiratory: Negative  Negative for apnea, chest tightness, shortness of breath and wheezing  Cardiovascular: Negative  Negative for chest pain, palpitations and leg swelling  Gastrointestinal: Negative  Negative for abdominal distention, abdominal pain, constipation, diarrhea and nausea  Genitourinary: Positive for frequency  Negative for difficulty urinating and dysuria  Social History     Social History    Marital status: /Civil Union     Spouse name: N/A    Number of children: N/A    Years of education: N/A     Occupational History    Not on file  Social History Main Topics    Smoking status: Never Smoker    Smokeless tobacco: Never Used    Alcohol use No    Drug use: No    Sexual activity: Not on file     Other Topics Concern    Not on file     Social History Narrative    No narrative on file               No Known Allergies    Immunization History   Administered Date(s) Administered    Tdap 09/20/2016       Vitals:    01/02/19 1809   BP: 138/90   Pulse: 85   Temp: 97 7 °F (36 5 °C)     Body mass index is 30 43 kg/m²  Physical Exam   Constitutional: He is oriented to person, place, and time  He appears well-developed and well-nourished  HENT:   Head: Normocephalic and atraumatic  Eyes: Pupils are equal, round, and reactive to light  EOM are normal    Neck: Normal range of motion  Neck supple  Cardiovascular: Normal rate, regular rhythm and normal heart sounds  Pulmonary/Chest: Effort normal and breath sounds normal    Abdominal: Soft  Bowel sounds are normal    Neurological: He is alert and oriented to person, place, and time  Skin: Skin is warm and dry     Psychiatric: He has a normal mood and affect  His behavior is normal    Nursing note and vitals reviewed      Diabetic Foot Exam                Health Maintenance Due   Topic Date Due    CRC Screening: Colonoscopy  1965    INFLUENZA VACCINE  07/01/2018

## 2019-01-04 ENCOUNTER — OFFICE VISIT (OUTPATIENT)
Dept: PHYSICAL THERAPY | Facility: CLINIC | Age: 54
End: 2019-01-04
Payer: COMMERCIAL

## 2019-01-04 DIAGNOSIS — M48.02 FORAMINAL STENOSIS OF CERVICAL REGION: Primary | ICD-10-CM

## 2019-01-04 PROCEDURE — 97161 PT EVAL LOW COMPLEX 20 MIN: CPT | Performed by: PHYSICAL THERAPIST

## 2019-01-04 PROCEDURE — 97140 MANUAL THERAPY 1/> REGIONS: CPT | Performed by: PHYSICAL THERAPIST

## 2019-01-04 PROCEDURE — 97012 MECHANICAL TRACTION THERAPY: CPT | Performed by: PHYSICAL THERAPIST

## 2019-01-04 NOTE — PROGRESS NOTES
PT Evaluation     Today's date: 2019  Patient name: Xi Benitez  : 1965  MRN: [id number]  Referring provider: Mello Henry, PT  Dx:   Encounter Diagnosis     ICD-10-CM    1  Foraminal stenosis of cervical region M99 81                   Assessment  Assessment details: Xi Benitez is a 47 y o  male presenting to outpatient physical therapy at [place] [address] with complaints of cervical pain  He presents with decreased cervical range of motion, decreased postural strength, limited flexibility, poor postural awareness, decreased tolerance to activity and decreased functional mobility due to Foraminal stenosis of cervical region  (primary encounter diagnosis)  He would benefit from skilled PT services in order to address these deficits and reach maximum level of function  Thank you for the referral!  Impairments: abnormal or restricted ROM, activity intolerance, impaired physical strength, lacks appropriate home exercise program and pain with function  Barriers to therapy: None  Understanding of Dx/Px/POC: excellent   Prognosis: good    Goals  ST  Independent with HEP in 2 weeks  2  Increase cervical AROM to WNL all motions in 3 weeks   3  Good postural awareness in 2 weeks    LT  Achieve FOTO score of 66/100 in 4 weeks   2  Able to lift and carry 50# without cervical pain in 4 weeks  3  Strength traps = 5/5 in 4 weeks  4    No tightness cervical paraspinals in 4 weeks    Plan  Patient would benefit from: skilled PT  Planned modality interventions: cryotherapy, TENS, thermotherapy: hydrocollator packs and traction  Planned therapy interventions: ADL retraining, flexibility, functional ROM exercises, home exercise program, joint mobilization, manual therapy, neuromuscular re-education, postural training, strengthening, stretching, therapeutic activities and therapeutic exercise  Frequency: 2x week  Duration in weeks: 4  Plan of Care beginning date: 2019  Plan of Care expiration date: 2/3/2019  Treatment plan discussed with: patient        Subjective Evaluation    History of Present Illness  Mechanism of injury: Pt reports having cervical pain with some L shoulder and scapular pain for about 20 years  Had PT earlier this year with some pain relief and will have cervical MRI on 19  Pt working full duty as a   Recurrent probem    Quality of life: good    Pain  Current pain ratin  At best pain ratin  At worst pain ratin  Quality: tight and dull ache  Progression: no change    Social Support    Employment status: working  Hand dominance: right      Diagnostic Tests  X-ray: abnormal (Lower cervical foraminal stenosis L)  Treatments  Previous treatment: physical therapy  Patient Goals  Patient goals for therapy: decreased pain and increased motion          Objective     Special Questions  Negative for disturbed sleep, dizziness and headaches    Static Posture     Head  Forward  Shoulders  Rounded  Postural Observations  Seated posture: poor  Standing posture: fair  Correction of posture: makes symptoms better        Palpation   Left   Hypertonic in the suboccipitals and upper trapezius  Tenderness of the suboccipitals  Right Tenderness of the suboccipitals  Tenderness   Cervical Spine   Tenderness in the facet joint (L C7)  Neurological Testing     Sensation   Cervical/Thoracic   Left   Intact: light touch    Right   Intact: light touch    Reflexes   Left   Biceps (C5/C6): normal (2+)  Brachioradialis (C6): normal (2+)    Right   Biceps (C5/C6): normal (2+)  Brachioradialis (C6): normal (2+)    Active Range of Motion   Cervical/Thoracic Spine   Cervical    Flexion: WFL  Extension: WFL  Left lateral flexion: 50 degrees   Right lateral flexion: 50 degrees   Left rotation: 50 degrees   Right rotation: 75 degrees     Additional Active Range of Motion Details  Listed is % available of AROM       Joint Play Hypomobile: C7 Pain: C7 Strength/Myotome Testing     Left Shoulder     Planes of Motion   Flexion: 5   Extension: 5   Abduction: 5   External rotation at 0°: 5   Internal rotation at 0°: 5     Isolated Muscles   Middle trapezius: 4     Right Shoulder     Planes of Motion   Flexion: 5   Extension: 5   Abduction: 5   External rotation at 0°: 5   Internal rotation at 0°: 5     Isolated Muscles   Middle trapezius: 4+     Left Elbow   Flexion: 5  Extension: 5    Right Elbow   Flexion: 5  Extension: 5    Tests   Cervical     Left   Negative alar ligament integrity and Spurling's sign  Right   Negative alar ligament integrity and Spurling's sign  Left Shoulder   Negative ULTT1  Right Shoulder   Negative ULTT1  Flowsheet Rows      Most Recent Value   PT/OT G-Codes   Current Score  51   Projected Score  66   FOTO information reviewed  Yes        Daily Treatment Diary     Dx:    1   Foraminal stenosis of cervical region      POC EXPIRES On:  2/3/19  PRECAUTIONS:  None  CO-MORBIDITES:  None  PERSONAL FACTORS:  Needs late appointments    Manual  1/4            Cervical rotation mobs lower c-spine  5'            STM cervical paraspinals L/R 8'                                                       Exercise Diary  1/4            Seated chin tucks with scap retraction 10                                                                                                                                                                                                                                                                       Modalities  1/4            Cervical traction 10'

## 2019-01-07 ENCOUNTER — OFFICE VISIT (OUTPATIENT)
Dept: PHYSICAL THERAPY | Facility: CLINIC | Age: 54
End: 2019-01-07
Payer: COMMERCIAL

## 2019-01-07 DIAGNOSIS — M48.02 FORAMINAL STENOSIS OF CERVICAL REGION: Primary | ICD-10-CM

## 2019-01-07 PROCEDURE — 97012 MECHANICAL TRACTION THERAPY: CPT | Performed by: PHYSICAL THERAPIST

## 2019-01-07 PROCEDURE — 97140 MANUAL THERAPY 1/> REGIONS: CPT | Performed by: PHYSICAL THERAPIST

## 2019-01-07 NOTE — PROGRESS NOTES
Daily Note     Today's date: 2019  Patient name: To Thomas  : 1965  MRN: [id number]  Referring provider: David Smith, PT  Dx:   Encounter Diagnosis     ICD-10-CM    1  Foraminal stenosis of cervical region M99 81                   Subjective:  Pt reports having min less pain since IE  Had a self adjustment 2 days ago and a little looser now  Objective: See treatment diary below      Assessment:  Pt presented to outpatient physical therapy at [place] [address] with complaints of cervical pain  He presents with decreased cervical range of motion, decreased postural strength, limited flexibility, poor postural awareness, decreased tolerance to activity and decreased functional mobility due to Foraminal stenosis of cervical region  (primary encounter diagnosis)  He will continue to benefit from skilled PT services in order to address these deficits and reach maximum level of function  Mod more cervical rotational motion post manual xt today  Pt doing well with HEP  Plan:  Continue current tx x 2-3 weeks         POC EXPIRES On:  2/3/19  PRECAUTIONS:  None  CO-MORBIDITES:  None  PERSONAL FACTORS:  Needs late appointments    Manual             Cervical rotation mobs lower c-spine  5' 5'           STM cervical paraspinals L/R 8' 8'                                                      Exercise Diary              Seated chin tucks with scap retraction 10                                                                                                                                                                                                                                                                       Modalities             Cervical traction 10' 10'

## 2019-01-09 ENCOUNTER — OFFICE VISIT (OUTPATIENT)
Dept: PHYSICAL THERAPY | Facility: CLINIC | Age: 54
End: 2019-01-09
Payer: COMMERCIAL

## 2019-01-09 DIAGNOSIS — M48.02 FORAMINAL STENOSIS OF CERVICAL REGION: Primary | ICD-10-CM

## 2019-01-09 PROCEDURE — 97140 MANUAL THERAPY 1/> REGIONS: CPT | Performed by: PHYSICAL THERAPIST

## 2019-01-09 PROCEDURE — 97012 MECHANICAL TRACTION THERAPY: CPT | Performed by: PHYSICAL THERAPIST

## 2019-01-09 NOTE — PROGRESS NOTES
Daily Note     Today's date: 2019  Patient name: Elena Stallworth II  : 1965  MRN: [id number]  Referring provider: Chico Fajardo, PT  Dx:   Encounter Diagnosis     ICD-10-CM    1  Foraminal stenosis of cervical region M99 81                 Subjective:  Pt reports having less pain since starting PT  Feeling better about his postural awareness  Objective: See treatment diary below      Assessment:  Pt presented to outpatient physical therapy at [place] [address] with complaints of cervical pain  He presents with decreased cervical range of motion, decreased postural strength, limited flexibility, poor postural awareness, decreased tolerance to activity and decreased functional mobility due to Foraminal stenosis of cervical region  (primary encounter diagnosis)  He will continue to benefit from skilled PT services in order to address these deficits and reach maximum level of function  Mod more cervical rotational motion post manual xt today with less pain and better posture  Pt doing well with HEP  Plan:  Continue current tx x 2-3 weeks  Pt away until for the next 2 weeks  POC expires on 2/3/19  MRI on 19         POC EXPIRES On:  2/3/19  PRECAUTIONS:  None  CO-MORBIDITES:  None  PERSONAL FACTORS:  Needs late appointments    Manual            Cervical rotation mobs lower c-spine  5' 5' 10'          STM cervical paraspinals L/R 8' 8' 15'                                                     Exercise Diary             Seated chin tucks with scap retraction 10 10                                                                                                                                                                                                                                                                      Modalities            Cervical traction 10' 10' 12'

## 2019-01-10 ENCOUNTER — OFFICE VISIT (OUTPATIENT)
Dept: FAMILY MEDICINE CLINIC | Facility: HOSPITAL | Age: 54
End: 2019-01-10
Payer: COMMERCIAL

## 2019-01-10 VITALS
HEART RATE: 122 BPM | WEIGHT: 218.6 LBS | TEMPERATURE: 97.1 F | DIASTOLIC BLOOD PRESSURE: 90 MMHG | BODY MASS INDEX: 30.6 KG/M2 | SYSTOLIC BLOOD PRESSURE: 144 MMHG | HEIGHT: 71 IN

## 2019-01-10 DIAGNOSIS — J02.9 SORE THROAT: Primary | ICD-10-CM

## 2019-01-10 DIAGNOSIS — Z23 ENCOUNTER FOR IMMUNIZATION: ICD-10-CM

## 2019-01-10 LAB — S PYO AG THROAT QL: NEGATIVE

## 2019-01-10 PROCEDURE — 87880 STREP A ASSAY W/OPTIC: CPT | Performed by: FAMILY MEDICINE

## 2019-01-10 PROCEDURE — 3008F BODY MASS INDEX DOCD: CPT | Performed by: FAMILY MEDICINE

## 2019-01-10 PROCEDURE — 1036F TOBACCO NON-USER: CPT | Performed by: FAMILY MEDICINE

## 2019-01-10 PROCEDURE — 99213 OFFICE O/P EST LOW 20 MIN: CPT | Performed by: FAMILY MEDICINE

## 2019-01-10 NOTE — PROGRESS NOTES
[facility]  [address] [facility]  [address], [address]  Tel: [phone]      Assessment/Plan:    Problem List Items Addressed This Visit     None      Visit Diagnoses     Sore throat    -  Primary    Encounter for immunization        Relevant Orders    PREFERRED: influenza vaccine, [id number], quadrivalent, recombinant, PF, 0 5 mL, for patients 18 yr+ (FLUBLOK)        Discussed sore throat  Likely a self limiting viral illness  Rapid strep is negative  No significant abnormality seen on examination  However if he continues to have sore throat we a feeling of a mass in his throat he will need to be seen by ENT for further evaluation  To call in 1-2 weeks if this continues on for referral to ENT              _____________________________________________________________________    History of Present Illness:     Patient is here for an acute visit      Sore Throat (States when he sticks his finger all the way down his throat he feels bumps ); Muscle Pain (Sharp pains - extremities ); and Hip Pain    Here for 1 week of sore throat  No fver, no chills  Feels a left sided gland is swollen  Mild pain on swallowing  No gerd or PND noted  He sticks a finger down his throat and reports feeling a bump in his throat              -----------------------------  Review of Systems   Constitutional: Negative  Negative for activity change, appetite change, chills and diaphoresis  HENT: Negative for congestion and dental problem  Respiratory: Negative  Negative for apnea, chest tightness, shortness of breath and wheezing  Cardiovascular: Negative  Negative for chest pain, palpitations and leg swelling  Gastrointestinal: Negative  Negative for abdominal distention, abdominal pain, constipation, diarrhea and nausea  Genitourinary: Negative  Negative for difficulty urinating, dysuria and frequency         Social Hx reviewed:    Social History     Social History    Marital status: /Civil Union     Spouse name: N/A    Number of children: N/A    Years of education: N/A     Occupational History    Not on file  Social History Main Topics    Smoking status: Never Smoker    Smokeless tobacco: Never Used    Alcohol use No    Drug use: No    Sexual activity: Not on file     Other Topics Concern    Not on file     Social History Narrative    No narrative on file       History reviewed  No pertinent past medical history  No Known Allergies      Vitals:    01/10/19 0750   BP: 144/90   Pulse: (!) 122   Temp: (!) 97 1 °F (36 2 °C)     Physical Exam   Constitutional: He appears well-developed and well-nourished  HENT:   Head: Normocephalic  Mouth/Throat: Uvula is midline, oropharynx is clear and moist and mucous membranes are normal  No oropharyngeal exudate  No tonsillar exudate  Eyes: Pupils are equal, round, and reactive to light  EOM are normal    Neck: Normal range of motion  Neck supple  Lymphadenopathy:     He has no cervical adenopathy  Vitals reviewed  To call our office if any concerns/questions at [phone]

## 2019-01-11 ENCOUNTER — HOSPITAL ENCOUNTER (OUTPATIENT)
Dept: RADIOLOGY | Facility: HOSPITAL | Age: 54
Discharge: HOME | End: 2019-01-11
Payer: COMMERCIAL

## 2019-01-11 ENCOUNTER — HOSPITAL ENCOUNTER (OUTPATIENT)
Dept: MRI IMAGING | Facility: HOSPITAL | Age: 54
Discharge: HOME | End: 2019-01-11
Payer: COMMERCIAL

## 2019-01-11 DIAGNOSIS — Z18.12 FOREIGN BODY IN EYE, OLD, LENS, UNSPECIFIED LATERALITY: ICD-10-CM

## 2019-01-11 DIAGNOSIS — H44.739 FOREIGN BODY IN EYE, OLD, LENS, UNSPECIFIED LATERALITY: ICD-10-CM

## 2019-01-11 DIAGNOSIS — M50.90 CERVICAL NECK PAIN WITH EVIDENCE OF DISC DISEASE: ICD-10-CM

## 2019-01-11 PROCEDURE — 72141 MRI NECK SPINE W/O DYE: CPT

## 2019-01-23 ENCOUNTER — OFFICE VISIT (OUTPATIENT)
Dept: PHYSICAL THERAPY | Facility: CLINIC | Age: 54
End: 2019-01-23
Payer: COMMERCIAL

## 2019-01-23 DIAGNOSIS — M48.02 FORAMINAL STENOSIS OF CERVICAL REGION: Primary | ICD-10-CM

## 2019-01-23 PROCEDURE — 97140 MANUAL THERAPY 1/> REGIONS: CPT | Performed by: PHYSICAL THERAPIST

## 2019-01-23 PROCEDURE — 97012 MECHANICAL TRACTION THERAPY: CPT | Performed by: PHYSICAL THERAPIST

## 2019-01-23 NOTE — PROGRESS NOTES
Daily Note     Today's date: 2019  Patient name: Marcy Sheth II  : 1965  MRN: [id number]  Referring provider: Julia Falk PT  Dx:   Encounter Diagnosis     ICD-10-CM    1  Foraminal stenosis of cervical region M99 81                  Subjective:  Pt reports having less pain since starting PT  Still some occasional L UE tingling  Felt pretty good on vacation last week  Objective: See treatment diary below      Assessment:  Pt presented to outpatient physical therapy at [place] [address] with complaints of cervical pain  He presents with decreased cervical range of motion, decreased postural strength, limited flexibility, poor postural awareness, decreased tolerance to activity and decreased functional mobility due to Foraminal stenosis of cervical region  (primary encounter diagnosis)  He will continue to benefit from skilled PT services in order to address these deficits and reach maximum level of function  Min less cervical motion today after missing PT for the past 2 weeks  However, mod increase in rotational motion post manual tx  Plan:  Continue current tx x 2 weeks  POC expires on 2/3/19          POC EXPIRES On:  2/3/19  PRECAUTIONS:  None  CO-MORBIDITES:  None  PERSONAL FACTORS:  Needs late appointments    Manual           Cervical rotation mobs lower c-spine  5' 5' 10' 10'         STM cervical paraspinals L/R 8' 8' 15' 15'                                                    Exercise Diary            Seated chin tucks with scap retraction 10 10 10                                                                                                                                                                                                                                                                     Modalities           Cervical traction 10' 10' 12' 12'

## 2019-01-28 ENCOUNTER — OFFICE VISIT (OUTPATIENT)
Dept: PHYSICAL THERAPY | Facility: CLINIC | Age: 54
End: 2019-01-28
Payer: COMMERCIAL

## 2019-01-28 DIAGNOSIS — M48.02 FORAMINAL STENOSIS OF CERVICAL REGION: Primary | ICD-10-CM

## 2019-01-28 PROCEDURE — 97012 MECHANICAL TRACTION THERAPY: CPT | Performed by: PHYSICAL THERAPIST

## 2019-01-28 PROCEDURE — 97140 MANUAL THERAPY 1/> REGIONS: CPT | Performed by: PHYSICAL THERAPIST

## 2019-01-28 NOTE — PROGRESS NOTES
Daily Note     Today's date: 2019  Patient name: Kristina Parnell  : 1965  MRN: [id number]  Referring provider: Benji Monroe PT  Dx:   Encounter Diagnosis     ICD-10-CM    1  Foraminal stenosis of cervical region M99 81                  Subjective:  Pt reports having less pain since starting PT  Still less occasional L UE tingling  Objective: See treatment diary below      Assessment:  Pt presented to outpatient physical therapy at [place] [address] with complaints of cervical pain  He presents with decreased cervical range of motion, decreased postural strength, limited flexibility, poor postural awareness, decreased tolerance to activity and decreased functional mobility due to foraminal stenosis of cervical region  (primary encounter diagnosis)  He will continue to benefit from skilled PT services in order to address these deficits and reach maximum level of function  Min more cervical motion today after manual tx  Plan:  Continue current tx x 1 more session  POC expires on 2/3/19  Review HEP         POC EXPIRES On:  2/3/19  PRECAUTIONS:  None  CO-MORBIDITES:  None  PERSONAL FACTORS:  Needs late appointments    Manual          Cervical rotation mobs lower c-spine  5' 5' 10' 10' 10'        STM cervical paraspinals L/R 8' 8' 15' 15' 15'                                                   Exercise Diary           Seated chin tucks with scap retraction 10 10 10 10                                                                                                                                                                                                                                                                    Modalities          Cervical traction 10' 10' 12' 12' 12'

## 2019-01-30 ENCOUNTER — OFFICE VISIT (OUTPATIENT)
Dept: PHYSICAL THERAPY | Facility: CLINIC | Age: 54
End: 2019-01-30
Payer: COMMERCIAL

## 2019-01-30 DIAGNOSIS — M48.02 FORAMINAL STENOSIS OF CERVICAL REGION: Primary | ICD-10-CM

## 2019-01-30 PROCEDURE — 97012 MECHANICAL TRACTION THERAPY: CPT | Performed by: PHYSICAL THERAPIST

## 2019-01-30 PROCEDURE — 97140 MANUAL THERAPY 1/> REGIONS: CPT | Performed by: PHYSICAL THERAPIST

## 2019-01-30 NOTE — PROGRESS NOTES
PT Discharge    Today's date: 2019  Patient name: Karen Tate  : 1965  MRN: [id number]  Referring provider: Juana Butt, PT  Dx:   Encounter Diagnosis     ICD-10-CM    1  Foraminal stenosis of cervical region M99 81                   Assessment  Assessment details: Karen Tate is a 47 y o  male who presenting to outpatient physical therapy at [place] [address] with complaints of cervical pain  He has progressed well, but still has some cervical and L shoulder pain due to cervical foraminal stenosis  He is now independent with his HEP  He may need additional PT in the future  FOTO scores have improved moderately  Barriers to therapy: None  Understanding of Dx/Px/POC: excellent   Prognosis: good    Goals  ST  Independent with HEP in 2 weeks - Met  2  Increase cervical AROM to WNL all motions in 3 weeks - mostly Met  3  Good postural awareness in 2 weeks - Met    LT  Achieve FOTO score of 66/100 in 4 weeks - Mostly Met  2  Able to lift and carry 50# without cervical pain in 4 weeks - Met, but with pain  3  Strength traps = 5/5 in 4 weeks - Met  4  No tightness cervical paraspinals in 4 weeks - Mostly Met    Plan  Treatment plan discussed with: patient        Subjective Evaluation    History of Present Illness  Mechanism of injury: Pt reports having cervical pain with some L shoulder and scapular pain for about 20 years  Had PT earlier this year with some pain and min more with recent bout of PT, but still with mod pain at times  Pt working full duty as a                Recurrent probem    Quality of life: good    Pain  Current pain rating: 3  At best pain rating: 3  At worst pain ratin  Quality: tight and dull ache  Progression: improved    Social Support    Employment status: working  Hand dominance: right      Diagnostic Tests  X-ray: abnormal (Lower cervical foraminal stenosis L)  Treatments  Previous treatment: physical therapy  Current treatment: physical therapy        Objective     Concurrent Complaints  Negative for disturbed sleep, dizziness and headaches    Postural Observations  Seated posture: good  Standing posture: good  Correction of posture: makes symptoms better        Palpation   Left   Hypertonic in the suboccipitals and upper trapezius  Tenderness of the suboccipitals  Right   Tenderness of the suboccipitals  Cervical Spine Comments  Left suboccipitals: Minimal  Right suboccipitals: Minimal at most      Left Shoulder Comments  Left upper trapezius: Minimal      Tenderness   Cervical Spine   Tenderness in the facet joint (L C7 - minimal)  Neurological Testing     Sensation   Cervical/Thoracic   Left   Intact: light touch    Right   Intact: light touch    Reflexes   Left   Biceps (C5/C6): normal (2+)  Brachioradialis (C6): normal (2+)    Right   Biceps (C5/C6): normal (2+)  Brachioradialis (C6): normal (2+)    Active Range of Motion   Cervical/Thoracic Spine       Cervical    Flexion:  WFL  Extension:  WFL  Left lateral flexion: 75 degrees      Right lateral flexion: 75 degrees      Left rotation: 75 degrees  Right rotation: 90 degrees           Additional Active Range of Motion Details  Listed is % available of AROM  Joint Play   Joints within functional limits: C1, C2, C3, C4, C5, C6 and T1     Hypomobile: C7     Pain: C7     Strength/Myotome Testing     Left Shoulder     Planes of Motion   Flexion: 5   Extension: 5   Abduction: 5   External rotation at 0°: 5   Internal rotation at 0°: 5     Isolated Muscles   Middle trapezius: 5     Right Shoulder     Planes of Motion   Flexion: 5   Extension: 5   Abduction: 5   External rotation at 0°: 5   Internal rotation at 0°: 5     Isolated Muscles   Middle trapezius: 5     Left Elbow   Flexion: 5  Extension: 5    Right Elbow   Flexion: 5  Extension: 5    Tests   Cervical     Left   Negative Spurling's Test A  Right   Negative Spurling's Test A  Left Shoulder   Negative ULTT1  Right Shoulder   Negative ULTT1  Neuro Exam:     Headaches   Patient reports headaches: No          Daily Treatment Diary     Dx:    1   Foraminal stenosis of cervical region      POC EXPIRES On:  2/3/19  PRECAUTIONS:  None  CO-MORBIDITES:  None  PERSONAL FACTORS:  Needs late appointments     Manual  1/4 1/7 1/9 1/23 1/28 1/30           Cervical rotation mobs lower c-spine  5' 5' 10' 10' 10'  10'           STM cervical paraspinals L/R 8' 8' 15' 15' 15'  15'                                                                                         Exercise Diary  1/4 1/9 1/23 1/28               Seated chin tucks with scap retraction 10 10 10 10                                                                                                                                                                                                                                                                                                                                                                                                                                                                                             Modalities  1/4 1/7 1/9 1/23 1/28 1/30           Cervical traction 10' 10' 12' 12' 12'  15'

## 2019-03-31 LAB — HBA1C MFR BLD HPLC: 6.3 %

## 2019-04-18 ENCOUNTER — TELEPHONE (OUTPATIENT)
Dept: FAMILY MEDICINE CLINIC | Facility: HOSPITAL | Age: 54
End: 2019-04-18

## 2019-04-26 ENCOUNTER — OFFICE VISIT (OUTPATIENT)
Dept: FAMILY MEDICINE CLINIC | Facility: HOSPITAL | Age: 54
End: 2019-04-26
Payer: COMMERCIAL

## 2019-04-26 VITALS
BODY MASS INDEX: 28.11 KG/M2 | TEMPERATURE: 97.7 F | HEART RATE: 90 BPM | HEIGHT: 71 IN | SYSTOLIC BLOOD PRESSURE: 118 MMHG | WEIGHT: 200.8 LBS | DIASTOLIC BLOOD PRESSURE: 92 MMHG | OXYGEN SATURATION: 98 %

## 2019-04-26 DIAGNOSIS — L08.9 INFECTED SEBACEOUS CYST: Primary | ICD-10-CM

## 2019-04-26 DIAGNOSIS — R47.01 EXPRESSIVE APHASIA: ICD-10-CM

## 2019-04-26 DIAGNOSIS — L72.3 INFECTED SEBACEOUS CYST: Primary | ICD-10-CM

## 2019-04-26 PROCEDURE — 99213 OFFICE O/P EST LOW 20 MIN: CPT | Performed by: NURSE PRACTITIONER

## 2019-04-26 RX ORDER — CEPHALEXIN 500 MG/1
500 CAPSULE ORAL EVERY 8 HOURS SCHEDULED
Qty: 21 CAPSULE | Refills: 0 | Status: SHIPPED | OUTPATIENT
Start: 2019-04-26 | End: 2019-05-03

## 2019-04-30 ENCOUNTER — OFFICE VISIT (OUTPATIENT)
Dept: FAMILY MEDICINE CLINIC | Facility: HOSPITAL | Age: 54
End: 2019-04-30
Payer: COMMERCIAL

## 2019-04-30 VITALS
SYSTOLIC BLOOD PRESSURE: 132 MMHG | BODY MASS INDEX: 28.28 KG/M2 | HEIGHT: 71 IN | HEART RATE: 102 BPM | WEIGHT: 202 LBS | DIASTOLIC BLOOD PRESSURE: 82 MMHG | TEMPERATURE: 98 F

## 2019-04-30 DIAGNOSIS — L08.9 INFECTED SEBACEOUS CYST: ICD-10-CM

## 2019-04-30 DIAGNOSIS — I63.312 STROKE DUE TO THROMBOSIS OF LEFT MIDDLE CEREBRAL ARTERY (HCC): Primary | ICD-10-CM

## 2019-04-30 DIAGNOSIS — D15.1 ATRIAL MYXOMA: ICD-10-CM

## 2019-04-30 DIAGNOSIS — I65.22 CAROTID OCCLUSION, LEFT: ICD-10-CM

## 2019-04-30 DIAGNOSIS — L72.3 INFECTED SEBACEOUS CYST: ICD-10-CM

## 2019-04-30 DIAGNOSIS — R47.01 EXPRESSIVE APHASIA: ICD-10-CM

## 2019-04-30 PROCEDURE — 99215 OFFICE O/P EST HI 40 MIN: CPT | Performed by: INTERNAL MEDICINE

## 2019-05-05 ENCOUNTER — HOSPITAL ENCOUNTER (EMERGENCY)
Facility: HOSPITAL | Age: 54
Discharge: HOME | End: 2019-05-05
Attending: EMERGENCY MEDICINE | Admitting: EMERGENCY MEDICINE
Payer: COMMERCIAL

## 2019-05-05 VITALS
OXYGEN SATURATION: 99 % | BODY MASS INDEX: 28.27 KG/M2 | HEART RATE: 102 BPM | RESPIRATION RATE: 18 BRPM | HEIGHT: 71 IN | TEMPERATURE: 97.5 F | WEIGHT: 201.94 LBS

## 2019-05-05 DIAGNOSIS — L72.3 INFECTED SEBACEOUS CYST OF SKIN: Primary | ICD-10-CM

## 2019-05-05 DIAGNOSIS — L08.9 INFECTED SEBACEOUS CYST OF SKIN: Primary | ICD-10-CM

## 2019-05-05 PROCEDURE — 99282 EMERGENCY DEPT VISIT SF MDM: CPT

## 2019-05-05 PROCEDURE — 99282 EMERGENCY DEPT VISIT SF MDM: CPT | Performed by: PHYSICIAN ASSISTANT

## 2019-05-05 RX ORDER — LIDOCAINE HYDROCHLORIDE 10 MG/ML
10 INJECTION, SOLUTION EPIDURAL; INFILTRATION; INTRACAUDAL; PERINEURAL ONCE
Status: COMPLETED | OUTPATIENT
Start: 2019-05-05 | End: 2019-05-05

## 2019-05-05 RX ADMIN — LIDOCAINE HYDROCHLORIDE 10 ML: 10 INJECTION, SOLUTION EPIDURAL; INFILTRATION; INTRACAUDAL; PERINEURAL at 11:16

## 2019-05-06 ENCOUNTER — CONSULT (OUTPATIENT)
Dept: SURGERY | Facility: HOSPITAL | Age: 54
End: 2019-05-06
Payer: COMMERCIAL

## 2019-05-06 ENCOUNTER — TELEPHONE (OUTPATIENT)
Dept: SURGERY | Facility: HOSPITAL | Age: 54
End: 2019-05-06

## 2019-05-06 VITALS
WEIGHT: 203.4 LBS | HEART RATE: 101 BPM | HEIGHT: 71 IN | DIASTOLIC BLOOD PRESSURE: 88 MMHG | TEMPERATURE: 98.6 F | BODY MASS INDEX: 28.48 KG/M2 | SYSTOLIC BLOOD PRESSURE: 117 MMHG

## 2019-05-06 DIAGNOSIS — L72.3 INFECTED SEBACEOUS CYST: ICD-10-CM

## 2019-05-06 DIAGNOSIS — L08.9 INFECTED SEBACEOUS CYST: ICD-10-CM

## 2019-05-06 PROCEDURE — 99243 OFF/OP CNSLTJ NEW/EST LOW 30: CPT | Performed by: SURGERY

## 2019-05-07 ENCOUNTER — TELEPHONE (OUTPATIENT)
Dept: FAMILY MEDICINE CLINIC | Facility: HOSPITAL | Age: 54
End: 2019-05-07

## 2019-05-08 ENCOUNTER — TELEPHONE (OUTPATIENT)
Dept: FAMILY MEDICINE CLINIC | Facility: HOSPITAL | Age: 54
End: 2019-05-08

## 2019-05-14 ENCOUNTER — TELEPHONE (OUTPATIENT)
Dept: FAMILY MEDICINE CLINIC | Facility: HOSPITAL | Age: 54
End: 2019-05-14

## 2019-05-14 DIAGNOSIS — R47.01 EXPRESSIVE APHASIA: ICD-10-CM

## 2019-05-14 DIAGNOSIS — R41.841 COGNITIVE COMMUNICATION DISORDER: Primary | ICD-10-CM

## 2019-05-20 ENCOUNTER — OFFICE VISIT (OUTPATIENT)
Dept: SURGERY | Facility: HOSPITAL | Age: 54
End: 2019-05-20
Payer: COMMERCIAL

## 2019-05-20 DIAGNOSIS — S21.209A BACK WOUND: ICD-10-CM

## 2019-05-20 DIAGNOSIS — L72.3 SEBACEOUS CYST: Primary | ICD-10-CM

## 2019-05-20 PROCEDURE — 99213 OFFICE O/P EST LOW 20 MIN: CPT | Performed by: SURGERY

## 2019-05-28 ENCOUNTER — TELEPHONE (OUTPATIENT)
Dept: FAMILY MEDICINE CLINIC | Facility: HOSPITAL | Age: 54
End: 2019-05-28

## 2019-05-28 DIAGNOSIS — I63.312 STROKE DUE TO THROMBOSIS OF LEFT MIDDLE CEREBRAL ARTERY (HCC): Primary | ICD-10-CM

## 2019-05-28 DIAGNOSIS — R47.01 EXPRESSIVE APHASIA: ICD-10-CM

## 2019-06-19 ENCOUNTER — OFFICE VISIT (OUTPATIENT)
Dept: FAMILY MEDICINE CLINIC | Facility: HOSPITAL | Age: 54
End: 2019-06-19
Payer: COMMERCIAL

## 2019-06-19 VITALS
SYSTOLIC BLOOD PRESSURE: 132 MMHG | TEMPERATURE: 96 F | HEART RATE: 80 BPM | HEIGHT: 71 IN | DIASTOLIC BLOOD PRESSURE: 78 MMHG | BODY MASS INDEX: 28.48 KG/M2 | WEIGHT: 203.4 LBS

## 2019-06-19 DIAGNOSIS — L72.3 SEBACEOUS CYST: Primary | ICD-10-CM

## 2019-06-19 DIAGNOSIS — R47.01 EXPRESSIVE APHASIA: ICD-10-CM

## 2019-06-19 PROCEDURE — 3008F BODY MASS INDEX DOCD: CPT | Performed by: FAMILY MEDICINE

## 2019-06-19 PROCEDURE — 99213 OFFICE O/P EST LOW 20 MIN: CPT | Performed by: FAMILY MEDICINE

## 2019-06-27 ENCOUNTER — CONSULT (OUTPATIENT)
Dept: NEUROLOGY | Facility: CLINIC | Age: 54
End: 2019-06-27
Payer: COMMERCIAL

## 2019-06-27 VITALS
WEIGHT: 203.1 LBS | DIASTOLIC BLOOD PRESSURE: 90 MMHG | HEART RATE: 96 BPM | SYSTOLIC BLOOD PRESSURE: 130 MMHG | HEIGHT: 71 IN | BODY MASS INDEX: 28.43 KG/M2

## 2019-06-27 DIAGNOSIS — I65.22 CAROTID OCCLUSION, LEFT: Primary | ICD-10-CM

## 2019-06-27 DIAGNOSIS — E78.1 ESSENTIAL HYPERTRIGLYCERIDEMIA: ICD-10-CM

## 2019-06-27 DIAGNOSIS — D15.1 ATRIAL MYXOMA: ICD-10-CM

## 2019-06-27 DIAGNOSIS — R47.01 EXPRESSIVE APHASIA: ICD-10-CM

## 2019-06-27 DIAGNOSIS — I63.312 STROKE DUE TO THROMBOSIS OF LEFT MIDDLE CEREBRAL ARTERY (HCC): ICD-10-CM

## 2019-06-27 PROCEDURE — 99244 OFF/OP CNSLTJ NEW/EST MOD 40: CPT | Performed by: PSYCHIATRY & NEUROLOGY

## 2019-07-14 ENCOUNTER — APPOINTMENT (EMERGENCY)
Dept: RADIOLOGY | Facility: HOSPITAL | Age: 54
End: 2019-07-14
Payer: COMMERCIAL

## 2019-07-14 ENCOUNTER — HOSPITAL ENCOUNTER (EMERGENCY)
Facility: HOSPITAL | Age: 54
Discharge: HOME | End: 2019-07-14
Attending: EMERGENCY MEDICINE | Admitting: EMERGENCY MEDICINE
Payer: COMMERCIAL

## 2019-07-14 VITALS
RESPIRATION RATE: 20 BRPM | SYSTOLIC BLOOD PRESSURE: 132 MMHG | HEART RATE: 98 BPM | TEMPERATURE: 96.8 F | BODY MASS INDEX: 28.63 KG/M2 | OXYGEN SATURATION: 100 % | HEIGHT: 70 IN | DIASTOLIC BLOOD PRESSURE: 75 MMHG | WEIGHT: 200 LBS

## 2019-07-14 DIAGNOSIS — J02.9 ACUTE VIRAL PHARYNGITIS: Primary | ICD-10-CM

## 2019-07-14 LAB — S PYO AG THROAT QL: NEGATIVE

## 2019-07-14 PROCEDURE — 72040 X-RAY EXAM NECK SPINE 2-3 VW: CPT

## 2019-07-14 PROCEDURE — 87430 STREP A AG IA: CPT | Performed by: PHYSICIAN ASSISTANT

## 2019-07-14 PROCEDURE — 99282 EMERGENCY DEPT VISIT SF MDM: CPT | Performed by: PHYSICIAN ASSISTANT

## 2019-07-14 PROCEDURE — 99284 EMERGENCY DEPT VISIT MOD MDM: CPT
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
## 2019-07-17 ENCOUNTER — TELEPHONE (OUTPATIENT)
Dept: FAMILY MEDICINE CLINIC | Facility: HOSPITAL | Age: 54
End: 2019-07-17

## 2019-07-17 NOTE — TELEPHONE ENCOUNTER
Asking if the facility forms are complete for her ? They are on Dr Nikolay Frias  pls call pt's wife when forms are complete

## 2019-07-18 ENCOUNTER — VBI (OUTPATIENT)
Dept: ADMINISTRATIVE | Facility: OTHER | Age: 54
End: 2019-07-18

## 2019-07-18 NOTE — TELEPHONE ENCOUNTER
Please notify pts wife that his form is on the top of my pile but I was out on vacation for a week and am also not in the office tomorrow - I will try to have the form done by early next week and we will call her when it is done   Sorry for the delay

## 2019-07-18 NOTE — TELEPHONE ENCOUNTER
[address]    ED Visit Information     Ed visit date: 07/14/2019  Diagnosis Description: Acute viral pharyngitis  In Network? Yes Clau Luciano  Discharge status: Home  Discharged with meds ? No  Number of ED visits to date: 2  ED Severity:n/a     Outreach Information    Outreach successful: Yes 1   Date letter mailed:n/a  Date Finalized:7/18/2019    Care Coordination    Follow up appointment with pcp: no No ED f/u appt scheduled  Transportation issues ? No    Value [employer] type:  7 Day Outreach  Patient refsued the answer questions:  Yes  Emergent necessity warranted by diagnosis:  No  [facility] PCP:  Yes  Transportation:  [employer]  07/18/2019 02:27 PM Phone ([facility] [place] (Self) [phone] (H)   Call Complete  Personal communication with patient's spouse Timbo Patton) regarding Yohannes's recent ED visit on 7/14 for Acute viral pharyngitis  Patient was discharged without medication and was advised to follow up with his PCP within 3 days  She stated that he is feeling better  Patient declined to answer follow up questions

## 2019-08-06 ENCOUNTER — OFFICE VISIT (OUTPATIENT)
Dept: FAMILY MEDICINE CLINIC | Facility: HOSPITAL | Age: 54
End: 2019-08-06
Payer: COMMERCIAL

## 2019-08-06 VITALS
DIASTOLIC BLOOD PRESSURE: 82 MMHG | TEMPERATURE: 97.4 F | SYSTOLIC BLOOD PRESSURE: 118 MMHG | WEIGHT: 206.4 LBS | BODY MASS INDEX: 29.55 KG/M2 | HEART RATE: 94 BPM | HEIGHT: 70 IN

## 2019-08-06 DIAGNOSIS — E78.1 ESSENTIAL HYPERTRIGLYCERIDEMIA: ICD-10-CM

## 2019-08-06 DIAGNOSIS — R47.01 EXPRESSIVE APHASIA: ICD-10-CM

## 2019-08-06 DIAGNOSIS — I63.312 STROKE DUE TO THROMBOSIS OF LEFT MIDDLE CEREBRAL ARTERY (HCC): Primary | ICD-10-CM

## 2019-08-06 DIAGNOSIS — Z12.5 SCREENING FOR PROSTATE CANCER: ICD-10-CM

## 2019-08-06 DIAGNOSIS — Z13.29 SCREENING FOR THYROID DISORDER: ICD-10-CM

## 2019-08-06 DIAGNOSIS — F41.9 ANXIETY: ICD-10-CM

## 2019-08-06 DIAGNOSIS — R73.9 HYPERGLYCEMIA: ICD-10-CM

## 2019-08-06 PROBLEM — D15.1 ATRIAL MYXOMA: Status: RESOLVED | Noted: 2019-04-30 | Resolved: 2019-08-06

## 2019-08-06 PROCEDURE — 1036F TOBACCO NON-USER: CPT | Performed by: INTERNAL MEDICINE

## 2019-08-06 PROCEDURE — 3008F BODY MASS INDEX DOCD: CPT | Performed by: INTERNAL MEDICINE

## 2019-08-06 PROCEDURE — 99214 OFFICE O/P EST MOD 30 MIN: CPT | Performed by: INTERNAL MEDICINE

## 2019-08-06 RX ORDER — VENLAFAXINE HYDROCHLORIDE 37.5 MG/1
37.5 CAPSULE, EXTENDED RELEASE ORAL DAILY
Qty: 30 CAPSULE | Refills: 1 | Status: SHIPPED | OUTPATIENT
Start: 2019-08-06 | End: 2019-09-27 | Stop reason: ALTCHOICE

## 2019-08-06 NOTE — PROGRESS NOTES
Assessment/Plan:    Stroke due to thrombosis of left middle cerebral artery ([address] )  Saw Neuro in June, was advised to take ASA and statin but pt deferred, Neuro recommended LDL < 70, he does not smoke, he was told to f/u in 6 mos    Expressive aphasia  Pt in speech and OT therapy but has had minimal improvement and seemingly very frustrated and agitated, con't with therapy and assisted devices as recommended    Anxiety  Pt agitated and more angry at times today - understandable d/t aphasia - discussed medication with pt and wife but pt not able to communicate adequately, SE reviewed with wife and rx sent, pt has refused meds in the past and advised if he really fought her on taking the med to not push it at all but if  He is agreeable it has to be taken EVERY day and  d/w pt that it takes 4-6 wks to get maximum benefit of med and that med has to be taken every day and to not miss doses of med      Essential hypertriglyceridemia  Due for BW in Oct - order given, diet/exercise/wgt loss encouraged    Hyperglycemia  Due for BW - order given for Oc- Nov - will f/u in 2 mos or so        Diagnoses and all orders for this visit:    Stroke due to thrombosis of left middle cerebral artery (HCC)  -     CBC and differential; Future  -     Comprehensive metabolic panel; Future  -     Hemoglobin A1C; Future  -     Lipid panel; Future  -     TSH, 3rd generation with Free T4 reflex; Future  -     PSA Total (Reflex To Free); Future  -     CBC and differential  -     Comprehensive metabolic panel  -     Hemoglobin A1C  -     Lipid panel  -     TSH, 3rd generation with Free T4 reflex  -     PSA Total (Reflex To Free)    Expressive aphasia  -     CBC and differential; Future  -     Comprehensive metabolic panel; Future  -     Hemoglobin A1C; Future  -     Lipid panel; Future  -     TSH, 3rd generation with Free T4 reflex; Future  -     PSA Total (Reflex To Free);  Future  -     CBC and differential  -     Comprehensive metabolic panel  - Hemoglobin A1C  -     Lipid panel  -     TSH, 3rd generation with Free T4 reflex  -     PSA Total (Reflex To Free)    Anxiety  -     venlafaxine (EFFEXOR-XR) 37 5 mg 24 hr capsule; Take 1 capsule (37 5 mg total) by mouth daily for 30 days  -     CBC and differential; Future  -     Comprehensive metabolic panel; Future  -     Hemoglobin A1C; Future  -     Lipid panel; Future  -     TSH, 3rd generation with Free T4 reflex; Future  -     PSA Total (Reflex To Free); Future  -     CBC and differential  -     Comprehensive metabolic panel  -     Hemoglobin A1C  -     Lipid panel  -     TSH, 3rd generation with Free T4 reflex  -     PSA Total (Reflex To Free)    Hyperglycemia  -     Comprehensive metabolic panel; Future  -     Hemoglobin A1C; Future  -     Comprehensive metabolic panel  -     Hemoglobin A1C    Essential hypertriglyceridemia  -     Lipid panel; Future  -     Lipid panel    Screening for prostate cancer  -     PSA Total (Reflex To Free); Future  -     PSA Total (Reflex To Free)    Screening for thyroid disorder  -     TSH, 3rd generation with Free T4 reflex; Future  -     TSH, 3rd generation with Free T4 reflex          Subjective:      Patient ID: Meri Jackson is a 49 y o  male  HPI Pt here with wife for 3 mo follow up appt  Pt with significant expressive aphasia and most of HPI via pts wife  Pt con't to go to speech therapy and occupational therapy and ocular therapy  He still has expressive aphasia but says "I know" a lot  He con't to count a lot when asked questions  His wife notes no issues with gait but he con't to have issues with his vision and has limited vision in R eye  Pt saw Neuro in June  It was recommended he take ASA and statin but pt has refused  He is due for Bw in the fall  His wife notes a lot of agitation and aggravation  His wife notes he does cry to his kids at time  Pt asked about mood and all he does is count and point to calendar    He is agitated and gets upset easily  No colonoscopy in chart, FIT card was mailed    BW 3/19 and 6/18      Review of Systems   Unable to perform ROS: Other (expressive aphasia)   Constitutional: Negative for fever  HENT: Negative for congestion and sinus pain  Eyes: Positive for visual disturbance  Negative for pain  Respiratory: Negative for cough and shortness of breath  Cardiovascular: Negative for chest pain, palpitations and leg swelling  Gastrointestinal: Negative for abdominal pain, diarrhea and vomiting  Genitourinary: Negative for difficulty urinating and dysuria  Musculoskeletal: Negative for arthralgias and myalgias  Skin: Negative for rash and wound  Neurological: Negative for dizziness, seizures and headaches  Hematological: Does not bruise/bleed easily  Psychiatric/Behavioral: Positive for agitation and dysphoric mood  The patient is nervous/anxious  Objective:    /82   Pulse 94   Temp (!) 97 4 °F (36 3 °C)   Ht 5' 10" (1 778 m)   Wt 93 6 kg (206 lb 6 4 oz)   BMI 29 62 kg/m²      Physical Exam   Constitutional: He appears well-developed and well-nourished  No distress  HENT:   Head: Normocephalic and atraumatic  Mouth/Throat: No oropharyngeal exudate  Eyes: Conjunctivae are normal  Right eye exhibits no discharge  Left eye exhibits no discharge  Neck: Neck supple  No tracheal deviation present  Cardiovascular: Normal rate, regular rhythm and normal heart sounds  No murmur heard  Pulmonary/Chest: Effort normal and breath sounds normal  No respiratory distress  He has no wheezes  He has no rales  Abdominal: Soft  He exhibits no distension  There is no tenderness  There is no rebound and no guarding  Musculoskeletal: He exhibits no deformity  Neurological: He is alert  He exhibits normal muscle tone  Moves all extremities w/o focal deficit   Skin: Skin is warm and dry  No rash noted     R lateral rib cage region with hyperpigmented region with underlying induration, no obvious tenderness/drainage noted   Psychiatric:   cooperative but agitated and counts and points to calendar when asked any question   Nursing note and vitals reviewed

## 2019-08-06 NOTE — ASSESSMENT & PLAN NOTE
Saw Neuro in June, was advised to take ASA and statin but pt deferred, Neuro recommended LDL < 70, he does not smoke, he was told to f/u in 6 mos

## 2019-08-06 NOTE — ASSESSMENT & PLAN NOTE
Pt in speech and OT therapy but has had minimal improvement and seemingly very frustrated and agitated, con't with therapy and assisted devices as recommended

## 2019-08-06 NOTE — ASSESSMENT & PLAN NOTE
Pt agitated and more angry at times today - understandable d/t aphasia - discussed medication with pt and wife but pt not able to communicate adequately, SE reviewed with wife and rx sent, pt has refused meds in the past and advised if he really fought her on taking the med to not push it at all but if  He is agreeable it has to be taken EVERY day and  d/w pt that it takes 4-6 wks to get maximum benefit of med and that med has to be taken every day and to not miss doses of med

## 2019-09-22 NOTE — PROGRESS NOTES
Cardiology Consultation     Brittany Leon II  [id number]  1965  [address] [facility] [address] [address] [phone]  1  Stroke due to thrombosis of left middle cerebral artery ([address] )  Echo complete with contrast if indicated   2  Pure hypercholesterolemia  Echo complete with contrast if indicated   3  Atrial myxoma  Echo complete with contrast if indicated     Patient Active Problem List   Diagnosis    Allergic rhinitis    Anxiety    Cervical radiculopathy    Erectile dysfunction of non-organic origin    Essential hypertriglyceridemia    Insomnia    Obesity    Osteoarthritis of cervical spine    Stroke due to thrombosis of left middle cerebral artery (HCC)    Carotid occlusion, left    Expressive aphasia    Hyperglycemia       HPI patient is here for cardiology assessment  Patient has a history of having an echocardiogram in April of this year that demonstrated a large left atrial myxoma (4x2 cm)  He had presented to [facility] in [address] [place] [address] where he was working at the time with stroke symptoms and the myxoma was discovered  A massive embolic CVA was diagnosed on March 30, 2019 with left hemianopsia and severe aphasia  Patient went on to have open heart surgery done March 30th  At that time he had excision of a left atrial myxoma and repair of a residual atrial septal defect  He had cardiac catheterization done April 2nd which showed no significant obstructive coronary disease  Patient was noted to have minor luminal irregularities  He was right-dominant  Possible myocardial bridging was noted in the mid LAD  Surgery was done by Dr Blu Tong  He had follow-up with Dr Myriam Cortes and was encouraged to stay on aspirin and metoprolol  EKG done April 6th demonstrated sinus rhythm and was a normal tracing  He is here today with his wife  Unfortunately he has profound aphasia  He does seem frustrated by it  He appears to have no muscular issue  He has had no chest pain or significant dyspnea  He was supposed to remain on aspirin and metoprolol but the patient self discontinued this      PMH-  Past Medical History:   Diagnosis Date    Arthritis     Stroke [facility])         SOCIAL HISTORY-  Social History     Socioeconomic History    Marital status: /Civil Union     Spouse name: Not on file    Number of children: Not on file    Years of education: Not on file    Highest education level: Not on file   Occupational History    Not on file   Social Needs    Financial resource strain: Not on file    Food insecurity:     Worry: Not on file     Inability: Not on file    Transportation needs:     Medical: Not on file     Non-medical: Not on file   Tobacco Use    Smoking status: Former Smoker     Last attempt to quit: 2019     Years since quittin 7    Smokeless tobacco: Never Used   Substance and Sexual Activity    Alcohol use: Not Currently     Comment: socially    Drug use: Never    Sexual activity: Not on file   Lifestyle    Physical activity:     Days per week: Not on file     Minutes per session: Not on file    Stress: Not on file   Relationships    Social connections:     Talks on phone: Not on file     Gets together: Not on file     Attends Anabaptism service: Not on file     Active member of club or organization: Not on file     Attends meetings of clubs or organizations: Not on file     Relationship status: Not on file    Intimate partner violence:     Fear of current or ex partner: Not on file     Emotionally abused: Not on file     Physically abused: Not on file     Forced sexual activity: Not on file   Other Topics Concern    Not on file   Social History Narrative    Not on file        FAMILY HISTORY-  Family History   Problem Relation Age of Onset    COPD Mother     Diabetes Mother     Alzheimer's disease Mother  No Known Problems Father     Asthma Child        SURGICAL HISTORY-  Past Surgical History:   Procedure Laterality Date    CARDIAC CATHETERIZATION      CARDIAC SURGERY      TISSUE PLASMINOGEN ACTIVATOR (TPA), EIA(HISTORICAL)         No current outpatient medications on file  No Known Allergies  Vitals:    09/27/19 1108   BP: 120/82   BP Location: Left arm   Patient Position: Sitting   Cuff Size: Standard   Pulse: 82         Review of Systems:  Review of Systems   All other systems reviewed and are negative  Physical Exam:  Physical Exam   Constitutional: He is oriented to person, place, and time  He appears well-developed and well-nourished  HENT:   Head: Normocephalic and atraumatic  Eyes: Pupils are equal, round, and reactive to light  Conjunctivae are normal    Neck: Normal range of motion  Neck supple  Cardiovascular: Normal rate and normal heart sounds  Pulmonary/Chest: Effort normal and breath sounds normal    Neurological: He is alert and oriented to person, place, and time  Skin: Skin is warm and dry  Psychiatric: He has a normal mood and affect  Vitals reviewed  Discussion/Summary:  Patient is stable  He has no chest pain or significant dyspnea  I will arrange follow-up echocardiogram   I have asked he and his wife to call if there is a problem in the interim otherwise I will see him on a yearly basis

## 2019-09-27 ENCOUNTER — OFFICE VISIT (OUTPATIENT)
Dept: CARDIOLOGY CLINIC | Facility: CLINIC | Age: 54
End: 2019-09-27
Payer: COMMERCIAL

## 2019-09-27 VITALS — SYSTOLIC BLOOD PRESSURE: 120 MMHG | DIASTOLIC BLOOD PRESSURE: 82 MMHG | HEART RATE: 82 BPM

## 2019-09-27 DIAGNOSIS — I63.312 STROKE DUE TO THROMBOSIS OF LEFT MIDDLE CEREBRAL ARTERY (HCC): Primary | ICD-10-CM

## 2019-09-27 DIAGNOSIS — D15.1 ATRIAL MYXOMA: ICD-10-CM

## 2019-09-27 DIAGNOSIS — E78.00 PURE HYPERCHOLESTEROLEMIA: ICD-10-CM

## 2019-09-27 PROCEDURE — 99244 OFF/OP CNSLTJ NEW/EST MOD 40: CPT | Performed by: INTERNAL MEDICINE

## 2019-10-08 ENCOUNTER — OFFICE VISIT (OUTPATIENT)
Dept: FAMILY MEDICINE CLINIC | Facility: HOSPITAL | Age: 54
End: 2019-10-08
Payer: COMMERCIAL

## 2019-10-08 VITALS
BODY MASS INDEX: 31.21 KG/M2 | DIASTOLIC BLOOD PRESSURE: 80 MMHG | HEIGHT: 70 IN | TEMPERATURE: 98.6 F | SYSTOLIC BLOOD PRESSURE: 138 MMHG | WEIGHT: 218 LBS | HEART RATE: 113 BPM

## 2019-10-08 DIAGNOSIS — D15.1 ATRIAL MYXOMA: ICD-10-CM

## 2019-10-08 DIAGNOSIS — Z12.11 SCREENING FOR COLON CANCER: ICD-10-CM

## 2019-10-08 DIAGNOSIS — F41.9 ANXIETY: ICD-10-CM

## 2019-10-08 DIAGNOSIS — R47.01 EXPRESSIVE APHASIA: Primary | ICD-10-CM

## 2019-10-08 DIAGNOSIS — I63.312 STROKE DUE TO THROMBOSIS OF LEFT MIDDLE CEREBRAL ARTERY (HCC): ICD-10-CM

## 2019-10-08 PROCEDURE — 3008F BODY MASS INDEX DOCD: CPT | Performed by: INTERNAL MEDICINE

## 2019-10-08 PROCEDURE — 99214 OFFICE O/P EST MOD 30 MIN: CPT | Performed by: INTERNAL MEDICINE

## 2019-10-08 RX ORDER — AMOXICILLIN 500 MG/1
CAPSULE ORAL
Refills: 3 | COMMUNITY
Start: 2019-08-26 | End: 2019-12-17 | Stop reason: ALTCHOICE

## 2019-10-08 NOTE — ASSESSMENT & PLAN NOTE
Saw Cardio 2 wks ago, order for Echo given and is scheduled for Nov, currently off all meds, con't follow up as per Cardio

## 2019-10-08 NOTE — ASSESSMENT & PLAN NOTE
No new Neuro symptoms reported, has f/u with Neuro in Dec, off statin and ASA, con't f/u and meds as per Neuro, call with any new Neuro symptoms, con't therapy for expressive aphasia

## 2019-10-08 NOTE — PROGRESS NOTES
Assessment/Plan:    Expressive aphasia  Con't to do therapy w/minimal improvement- seems to be using "yes" and "no" more appropriately in response to questions, con't therapy as directed    Atrial myxoma  Saw Cardio 2 wks ago, order for Echo given and is scheduled for Nov, currently off all meds, con't follow up as per Cardio    Stroke due to thrombosis of left middle cerebral artery ([facility] [address] )  No new Neuro symptoms reported, has f/u with Neuro in Dec, off statin and ASA, con't f/u and meds as per Neuro, call with any new Neuro symptoms, con't therapy for expressive aphasia       Diagnoses and all orders for this visit:    Expressive aphasia    Anxiety    Atrial myxoma    Stroke due to thrombosis of left middle cerebral artery ([facility] [address] )    Screening for colon cancer  Comments:  FIT card given  Orders:  -     Occult Blood, Fecal, IA; Future  -     Occult Blood, Fecal, IA    Other orders  -     amoxicillin (AMOXIL) 500 mg capsule; TAKE FOUR CAPSULES BY MOUTH 1 HOUR PRIOR TO DENTAL VISIT      No baseline colonoscopy done yet - FIT cards given    BW 3/19 and 6/18 - order reprinted - was to be done 10/19    Pt is deferring flu vaccine    Subjective:      Patient ID: Abhijit Del Angel is a 49 y o  male  HPI Pt here for follow up appt with his wife  Pt with significant expressive aphasia and HPI via pts wife  Last visit pt was noted to have increase agitation and his wife states he was tearful and crying at times  He was given a rx for Effexor 37 5 1 tab PO q day  His wife was not sure if he would actually take the medication  She states he never started the medication and refused to take the pills  He con't to have fluctuation in mood and is agitated a lot and cries at times  He is doing PT 3 days a week and has started to use "yes" and "no" more appropriately  Still counting a lot in response to questions       Pt saw Ashley Ferreira in the end of Sept   He was given an order for an Echo and told to f/u annually  He has the Echo in Nov   Wgt up 16 lbs from April 2019  He does have big boots on  He has appt with Neuro in Dec  No new Neuro symptoms have been noted  He is no longer on statin or ASA  No baseline colonoscopy done yet    BW 3/19 and 6/18    Pt is deferring flu vaccine    Review of Systems   Unable to perform ROS: Patient nonverbal   Constitutional: Negative for fever  Skin: Negative for rash  Psychiatric/Behavioral: Positive for dysphoric mood  Objective:    /80 (BP Location: Left arm, Patient Position: Standing, Cuff Size: Standard)   Pulse (!) 113   Temp 98 6 °F (37 °C)   Ht 5' 10" (1 778 m)   Wt 98 9 kg (218 lb)   BMI 31 28 kg/m²      Physical Exam   Constitutional: He appears well-developed and well-nourished  No distress  HENT:   Head: Normocephalic and atraumatic  Mouth/Throat: No oropharyngeal exudate  Eyes: Conjunctivae are normal  Right eye exhibits no discharge  Left eye exhibits no discharge  Neck: Neck supple  No tracheal deviation present  Cardiovascular: Normal rate, regular rhythm and normal heart sounds  No murmur heard  Pulmonary/Chest: Effort normal and breath sounds normal  No respiratory distress  He has no wheezes  He has no rales  Abdominal: Soft  He exhibits no distension  There is no tenderness  There is no rebound and no guarding  Musculoskeletal: He exhibits no deformity  Ambulates w/o assistance   Neurological: He is alert  He exhibits normal muscle tone  Skin: Skin is warm and dry  No rash noted  Psychiatric:   Does not answer questions appropriately and appears frustrated, counts as a response to a lot of questions   Nursing note and vitals reviewed

## 2019-10-08 NOTE — ASSESSMENT & PLAN NOTE
Con't to do therapy w/minimal improvement- seems to be using "yes" and "no" more appropriately in response to questions, con't therapy as directed

## 2019-10-21 ENCOUNTER — TELEPHONE (OUTPATIENT)
Dept: CARDIOLOGY CLINIC | Facility: CLINIC | Age: 54
End: 2019-10-21

## 2019-10-21 NOTE — TELEPHONE ENCOUNTER
[address] stating pt was in office, needing a filling and wanted to know if pt needed to be pre medicated first      Dr Mosher Ahr on vacation  Dr Alicia Reich in office  As per Dr Alicia Reich, pt did not need to be pre medicated  Message was relayed as given

## 2019-11-08 ENCOUNTER — HOSPITAL ENCOUNTER (OUTPATIENT)
Dept: NON INVASIVE DIAGNOSTICS | Facility: HOSPITAL | Age: 54
Discharge: HOME | End: 2019-11-08
Attending: INTERNAL MEDICINE
Payer: COMMERCIAL

## 2019-11-08 DIAGNOSIS — I63.312 STROKE DUE TO THROMBOSIS OF LEFT MIDDLE CEREBRAL ARTERY (HCC): ICD-10-CM

## 2019-11-08 DIAGNOSIS — D15.1 ATRIAL MYXOMA: ICD-10-CM

## 2019-11-08 DIAGNOSIS — E78.00 PURE HYPERCHOLESTEROLEMIA: ICD-10-CM

## 2019-11-08 PROCEDURE — 93306 TTE W/DOPPLER COMPLETE: CPT | Performed by: INTERNAL MEDICINE

## 2019-11-08 PROCEDURE — 93306 TTE W/DOPPLER COMPLETE: CPT

## 2019-11-12 ENCOUNTER — OFFICE VISIT (OUTPATIENT)
Dept: FAMILY MEDICINE CLINIC | Facility: HOSPITAL | Age: 54
End: 2019-11-12
Payer: COMMERCIAL

## 2019-11-12 VITALS
DIASTOLIC BLOOD PRESSURE: 80 MMHG | HEART RATE: 94 BPM | BODY MASS INDEX: 32.21 KG/M2 | SYSTOLIC BLOOD PRESSURE: 140 MMHG | TEMPERATURE: 96.4 F | WEIGHT: 225 LBS | HEIGHT: 70 IN

## 2019-11-12 DIAGNOSIS — R47.01 EXPRESSIVE APHASIA: ICD-10-CM

## 2019-11-12 DIAGNOSIS — J06.9 UPPER RESPIRATORY TRACT INFECTION, UNSPECIFIED TYPE: Primary | ICD-10-CM

## 2019-11-12 PROCEDURE — 1036F TOBACCO NON-USER: CPT | Performed by: INTERNAL MEDICINE

## 2019-11-12 PROCEDURE — 99214 OFFICE O/P EST MOD 30 MIN: CPT | Performed by: INTERNAL MEDICINE

## 2019-11-12 RX ORDER — GUAIFENESIN AND DEXTROMETHORPHAN HYDROBROMIDE 600; 30 MG/1; MG/1
1 TABLET, EXTENDED RELEASE ORAL EVERY 12 HOURS SCHEDULED
Qty: 28 EACH | Refills: 0 | Status: SHIPPED | OUTPATIENT
Start: 2019-11-12 | End: 2019-12-17 | Stop reason: ALTCHOICE

## 2019-11-12 NOTE — PROGRESS NOTES
Assessment/Plan:    Expressive aphasia  Difficult to obtain history d/t expressive aphasia, d/w pts wife that exam nml and he is afebrile but will watch closely - call with new/worse symptoms or if not better in 7-10 days       Diagnoses and all orders for this visit:    Upper respiratory tract infection, unspecified type  Comments:  D/w pts wife that very difficult to obtain history but only had symptoms x 2 days and exam was normal, will start Mucinex (wife asking for rx as she states it is the only way he will take something) and encourage symptomatic tx, if new/worse symptoms occur or if not better in 7-10 days she was told to call and would then start abx as he was pointing to teeth and want to ensure this does not progress into a sinus infection  Orders:  -     guaiFENesin-dextromethorphan ([address]) 600-30 mg; Take 1 tablet by mouth every 12 (twelve) hours    Expressive aphasia          Subjective:      Patient ID: Miky Ford is a 49 y o  male  HPI Pt here with wife for concern over nasal congestion and not sleeping well  He has expressive aphasia d/t recent stroke and HPI obtained from pts wife  Pt very frustrated and getting angry by end of appt    Pts wife notes approx 2 days of stuffy nose and ST  His wife notes no cough or known fevers  He has no reported N/V/D/rashes/urinary symptoms  He has had no sick contacts  He has not tried anything for his symptoms  Does point to teeth when asked about symptoms but otherwise just says "No, No, No, [address]"  Review of Systems   Unable to perform ROS: Other (expressive aphasia)   Constitutional: Negative for appetite change, chills and fever  HENT: Positive for congestion and sore throat  Eyes: Negative for redness  Respiratory: Negative for cough and shortness of breath  Gastrointestinal: Negative for diarrhea and vomiting  Skin: Negative for rash  Psychiatric/Behavioral: Negative for confusion           Objective:    BP 140/80   Pulse 94   Temp (!) 96 4 °F (35 8 °C)   Ht 5' 10" (1 778 m)   Wt 102 kg (225 lb)   BMI 32 28 kg/m²      Physical Exam   Constitutional: He appears well-developed and well-nourished  No distress  HENT:   Head: Normocephalic and atraumatic  Mouth/Throat: No oropharyngeal exudate  B/L TM occluded partially by cerumen  Post pharyngeal erythema but no exudate   Eyes: Conjunctivae are normal  Right eye exhibits no discharge  Left eye exhibits no discharge  Neck: Neck supple  No tracheal deviation present  Cardiovascular: Normal rate, regular rhythm and normal heart sounds  No murmur heard  Pulmonary/Chest: Effort normal and breath sounds normal  No respiratory distress  He has no wheezes  He has no rales  Abdominal: Soft  He exhibits no distension  There is no tenderness  Lymphadenopathy:     He has no cervical adenopathy  Neurological: He is alert  He exhibits normal muscle tone  Expressive aphasia   Skin: Skin is warm and dry  No rash noted  Psychiatric:   Irritable and angry by end of appt   Nursing note and vitals reviewed

## 2019-11-12 NOTE — ASSESSMENT & PLAN NOTE
Difficult to obtain history d/t expressive aphasia, d/w pts wife that exam nml and he is afebrile but will watch closely - call with new/worse symptoms or if not better in 7-10 days

## 2019-12-12 DIAGNOSIS — I63.312 STROKE DUE TO THROMBOSIS OF LEFT MIDDLE CEREBRAL ARTERY (HCC): Primary | ICD-10-CM

## 2019-12-17 ENCOUNTER — OFFICE VISIT (OUTPATIENT)
Dept: NEUROLOGY | Facility: CLINIC | Age: 54
End: 2019-12-17
Payer: COMMERCIAL

## 2019-12-17 VITALS
WEIGHT: 221 LBS | HEIGHT: 70 IN | HEART RATE: 101 BPM | SYSTOLIC BLOOD PRESSURE: 133 MMHG | DIASTOLIC BLOOD PRESSURE: 86 MMHG | BODY MASS INDEX: 31.64 KG/M2

## 2019-12-17 DIAGNOSIS — I63.312 STROKE DUE TO THROMBOSIS OF LEFT MIDDLE CEREBRAL ARTERY (HCC): ICD-10-CM

## 2019-12-17 DIAGNOSIS — I69.320 APHASIA AS LATE EFFECT OF CEREBROVASCULAR ACCIDENT: Primary | ICD-10-CM

## 2019-12-17 DIAGNOSIS — R47.01 EXPRESSIVE APHASIA: ICD-10-CM

## 2019-12-17 PROCEDURE — 99213 OFFICE O/P EST LOW 20 MIN: CPT | Performed by: PSYCHIATRY & NEUROLOGY

## 2019-12-17 NOTE — ASSESSMENT & PLAN NOTE
-patient had atrial myxoma   -BP goal < 130/80, BP is at goal    -Defer to PCP regarding DM and BP management  -does not smoke  -denies any history of snoring    -I advised patient to avoid using NSAIDs for headaches or other pain and instead to just stick to tylenol    -Recommend mediterranean diet for stroke risk  -recommend regular exercise regimen atleast 4-5 times a week for 20-30 minutes     -I educated patient/family regarding medication compliance

## 2019-12-17 NOTE — PROGRESS NOTES
Patient ID: Delio Mckeon is a 49 y o  male  Assessment/Plan: This is a 50 y/o Male who is here as follow up for L MCA stroke 2/2 atrial myxoma s/p resection  No new TIA/CVA Like symptoms  Patient has  pretty significant aphasia  He only has says few words does not really understand or follow any commands  He can write however  No motor sensory deficits  He does have significant visual loss on right eye  PLAN:      Problem List Items Addressed This Visit        Cardiovascular and Mediastinum    Stroke due to thrombosis of left middle cerebral artery (HCC)     -patient had atrial myxoma   -BP goal < 130/80, BP is at goal    -Defer to PCP regarding DM and BP management  -does not smoke  -denies any history of snoring    -I advised patient to avoid using NSAIDs for headaches or other pain and instead to just stick to tylenol    -Recommend mediterranean diet for stroke risk  -recommend regular exercise regimen atleast 4-5 times a week for 20-30 minutes  -I educated patient/family regarding medication compliance              Other    Expressive aphasia    Aphasia as late effect of cerebrovascular accident - Primary         I would like to follow up in 1 year  I would be happy to see the patient sooner if any new questions/concerns arise  Patient/Guardian was advised to the call the office if they have any questions and concerns in the meantime  Patient/Guardian does understand that if they have any new stroke like symptoms such as facial droop on one side, weakness/paralysis on either side, speech trouble, numbness on one side, balance issues, any vision changes, extreme dizziness or any new headache, to call 9-1-1 immediately or to proceed to the nearest ER immediately  Subjective:    HPI    This is a 50 y/o M who is here as a follow up for L MCA stroke  Etiology of the stroke is secondary to atrial myxoma which was resected    Patient was a former smoker but he quit 1 day after the stroke  He does not want to take any medications  He is still complaining of being fatigued but does sleep all day and is not getting enough sleep at night  Wife states that he does take melatonin 4 mg at bedtime which does not seem to help him  Did not have any new TIA or says Cerebrovascular accident like symptoms at this point  He is going to a day program 3 days a week for his significant aphasia  Wife states that he has made some progress as far as his deficits are are concerned but still has a tremendous amount of progress to make  The following portions of the patient's history were reviewed and updated as appropriate: He  has a past medical history of Arthritis and Stroke ([facility] [address] )  He   Patient Active Problem List    Diagnosis Date Noted    Aphasia as late effect of cerebrovascular accident 12/17/2019    Hyperglycemia 08/06/2019    Stroke due to thrombosis of left middle cerebral artery ([facility] [address] ) 04/30/2019    Carotid occlusion, left 04/30/2019    Expressive aphasia 04/30/2019    Cervical radiculopathy 12/09/2017    Osteoarthritis of cervical spine 12/09/2017    Insomnia 09/20/2016    Obesity 12/22/2015    Anxiety 04/28/2015    Allergic rhinitis 06/04/2013    Erectile dysfunction of non-organic origin 02/08/2013    Essential hypertriglyceridemia 02/08/2013     He  has a past surgical history that includes Cardiac surgery; tissue plasminogen activator (tpa), eia(historical); and Cardiac catheterization  His family history includes Alzheimer's disease in his mother; Asthma in his child; COPD in his mother; Diabetes in his mother; No Known Problems in his father  He  reports that he quit smoking about a year ago  He has never used smokeless tobacco  He reports that he drank alcohol  He reports that he does not use drugs  No current outpatient medications on file  No current facility-administered medications for this visit        Current Outpatient Medications on File Prior to Visit   Medication Sig    [DISCONTINUED] amoxicillin (AMOXIL) 500 mg capsule TAKE FOUR CAPSULES BY MOUTH 1 HOUR PRIOR TO DENTAL VISIT    [DISCONTINUED] guaiFENesin-dextromethorphan (MUCINEX DM) 600-30 mg Take 1 tablet by mouth every 12 (twelve) hours     No current facility-administered medications on file prior to visit  He has No Known Allergies            Objective:    Blood pressure 133/86, pulse 101, height 5' 10" (1 778 m), weight 100 kg (221 lb)  Physical examination -   General - Patient is alert, awake, follows commands  Speech - significant aphasia noted, patient only speaks in few words, comprehension is not intact, cannot name, can only write, but cannot read  Neuro:   Cranial nerves: PERRL, EOMI, no facial droop noted, facial sensation intact, tongue midline  Motor: 5/5 throughout  Sensory - intact to soft touch throughout  Gait - normal         ROS:  I personally reviewed ROS   Review of Systems   Constitutional: Positive for fatigue  Negative for appetite change and fever  HENT: Negative  Negative for hearing loss, tinnitus, trouble swallowing and voice change  Eyes: Negative  Negative for photophobia and pain  Respiratory: Negative  Negative for shortness of breath  Cardiovascular: Negative  Negative for palpitations  Gastrointestinal: Negative  Negative for nausea and vomiting  Endocrine: Negative  Negative for cold intolerance and heat intolerance  Genitourinary: Negative  Negative for dysuria, frequency and urgency  Musculoskeletal: Negative  Negative for myalgias and neck pain  Skin: Negative  Negative for rash  Neurological: Negative  Negative for dizziness, tremors, seizures, syncope, facial asymmetry, speech difficulty, weakness, light-headedness, numbness and headaches  Hematological: Negative  Does not bruise/bleed easily  Psychiatric/Behavioral: Negative  Negative for confusion, hallucinations and sleep disturbance

## 2020-01-17 ENCOUNTER — HOSPITAL ENCOUNTER (OUTPATIENT)
Dept: RADIOLOGY | Facility: HOSPITAL | Age: 55
Discharge: HOME | End: 2020-01-17
Payer: COMMERCIAL

## 2020-01-17 ENCOUNTER — OFFICE VISIT (OUTPATIENT)
Dept: FAMILY MEDICINE CLINIC | Facility: HOSPITAL | Age: 55
End: 2020-01-17
Payer: COMMERCIAL

## 2020-01-17 VITALS
BODY MASS INDEX: 33.64 KG/M2 | SYSTOLIC BLOOD PRESSURE: 142 MMHG | WEIGHT: 235 LBS | DIASTOLIC BLOOD PRESSURE: 92 MMHG | HEIGHT: 70 IN | HEART RATE: 108 BPM | TEMPERATURE: 97.8 F

## 2020-01-17 DIAGNOSIS — R10.9 RIGHT FLANK PAIN: Primary | ICD-10-CM

## 2020-01-17 DIAGNOSIS — I69.320 APHASIA AS LATE EFFECT OF CEREBROVASCULAR ACCIDENT: ICD-10-CM

## 2020-01-17 DIAGNOSIS — R10.9 RIGHT FLANK PAIN: ICD-10-CM

## 2020-01-17 LAB
SL AMB  POCT GLUCOSE, UA: NORMAL
SL AMB LEUKOCYTE ESTERASE,UA: NORMAL
SL AMB POCT BILIRUBIN,UA: NORMAL
SL AMB POCT BLOOD,UA: NORMAL
SL AMB POCT CLARITY,UA: CLEAR
SL AMB POCT COLOR,UA: YELLOW
SL AMB POCT KETONES,UA: NORMAL
SL AMB POCT NITRITE,UA: NORMAL
SL AMB POCT PH,UA: 5
SL AMB POCT SPECIFIC GRAVITY,UA: 1.01
SL AMB POCT URINE PROTEIN: NORMAL
SL AMB POCT UROBILINOGEN: NORMAL

## 2020-01-17 PROCEDURE — 1036F TOBACCO NON-USER: CPT | Performed by: INTERNAL MEDICINE

## 2020-01-17 PROCEDURE — 99214 OFFICE O/P EST MOD 30 MIN: CPT | Performed by: INTERNAL MEDICINE

## 2020-01-17 PROCEDURE — 72070 X-RAY EXAM THORAC SPINE 2VWS: CPT

## 2020-01-17 PROCEDURE — 81002 URINALYSIS NONAUTO W/O SCOPE: CPT | Performed by: INTERNAL MEDICINE

## 2020-01-17 PROCEDURE — 3008F BODY MASS INDEX DOCD: CPT | Performed by: INTERNAL MEDICINE

## 2020-01-17 NOTE — PROGRESS NOTES
Assessment/Plan:    Aphasia as late effect of cerebrovascular accident  D/w pts son that obvious difficulty obtaining symptoms d/t his aphasia, exam and vitals stable, urine clear, call with new/worse symptoms, will check Xray and follow closely - has appt in 3 mos       Diagnoses and all orders for this visit:    Right flank pain  Comments:  D/w pts son that obviously we are limited with history d/t his aphasia, urine checked and no sign of infection or blood to indicate kidney stones, pt w/o significant musculoskeletal pain on exam but d/t difficulty with history will check Xray, advised family to call with new/worse symptoms, will monitor for now  Orders:  -     XR spine thoracic 3 vw; Future  - POCT Urine dip    Aphasia as late effect of cerebrovascular accident          Subjective:      Patient ID: Chan Figueroa is a 49 y o  male  HPI Pt here with his son for presumed R flank pain  Pt aphasic from stroke and most of history via son and writing questions to pt - answers not always appropriate however  Pts son notes c/o R flank pain - knows this as pt tugging on people and then pointing to his R flank region  Pt denied actual pain with urination/blood in urine/fever/abd pain but again answers were inconsistent and not always appropriate  Urine dip clear  Son states he is acting normal and eating well  He does seem a bit slower with ambulation  He is not aware of any new activity/falls/injury  Review of Systems   Unable to perform ROS: Other   Constitutional: Negative for fever  Respiratory: Negative for cough  Gastrointestinal: Negative for abdominal pain and vomiting  Genitourinary: Negative for dysuria and hematuria  Musculoskeletal: Positive for back pain  Skin: Negative for wound  Neurological: Positive for speech difficulty           Objective:    /92 (BP Location: Right arm, Patient Position: Standing, Cuff Size: Standard)   Pulse (!) 108   Temp 97 8 °F (36 6 °C)   Ht 5' 10" (1 778 m)   Wt 107 kg (235 lb)   BMI 33 72 kg/m²      Physical Exam   Constitutional: He appears well-developed and well-nourished  No distress  HENT:   Head: Normocephalic and atraumatic  Mouth/Throat: No oropharyngeal exudate  Eyes: Conjunctivae are normal  Right eye exhibits no discharge  Left eye exhibits no discharge  Neck: Neck supple  No tracheal deviation present  Cardiovascular: Normal rate, regular rhythm and normal heart sounds  No murmur heard  Pulmonary/Chest: Effort normal and breath sounds normal  No respiratory distress  He has no wheezes  He has no rales  Abdominal: Soft  He exhibits no distension  There is no tenderness  There is no rebound and no guarding  No CVA tenderness noted on exam   Musculoskeletal:   No reaction or obvious pain with palp of spinous processed of thoracic or lumbar spine, no pain with palp of paraspinal region of thoracic or lumbar spine on the R, nml gait unassisted   Neurological: He is alert  He exhibits normal muscle tone  Skin: Skin is warm and dry  No rash noted  Healed scar R lower scapular region   Psychiatric:   Frustrated at times but cooperative   Nursing note and vitals reviewed

## 2020-01-17 NOTE — ASSESSMENT & PLAN NOTE
D/w pts son that obvious difficulty obtaining symptoms d/t his aphasia, exam and vitals stable, urine clear, call with new/worse symptoms, will check Xray and follow closely - has appt in 3 mos

## 2020-01-22 DIAGNOSIS — R10.9 RIGHT FLANK PAIN: Primary | ICD-10-CM

## 2020-02-18 ENCOUNTER — OFFICE VISIT (OUTPATIENT)
Dept: FAMILY MEDICINE CLINIC | Facility: HOSPITAL | Age: 55
End: 2020-02-18
Payer: COMMERCIAL

## 2020-02-18 VITALS
TEMPERATURE: 97.8 F | HEIGHT: 70 IN | HEART RATE: 107 BPM | DIASTOLIC BLOOD PRESSURE: 72 MMHG | WEIGHT: 232 LBS | SYSTOLIC BLOOD PRESSURE: 128 MMHG | BODY MASS INDEX: 33.21 KG/M2

## 2020-02-18 DIAGNOSIS — R06.83 SNORING: ICD-10-CM

## 2020-02-18 DIAGNOSIS — R47.01 EXPRESSIVE APHASIA: ICD-10-CM

## 2020-02-18 DIAGNOSIS — Z86.69 H/O SLEEP APNEA: ICD-10-CM

## 2020-02-18 DIAGNOSIS — G47.00 INSOMNIA, UNSPECIFIED TYPE: Primary | ICD-10-CM

## 2020-02-18 DIAGNOSIS — R06.81 WITNESSED EPISODE OF APNEA: ICD-10-CM

## 2020-02-18 DIAGNOSIS — E66.9 OBESITY (BMI 30.0-34.9): ICD-10-CM

## 2020-02-18 PROCEDURE — 99214 OFFICE O/P EST MOD 30 MIN: CPT | Performed by: INTERNAL MEDICINE

## 2020-02-18 PROCEDURE — 3008F BODY MASS INDEX DOCD: CPT | Performed by: INTERNAL MEDICINE

## 2020-02-18 PROCEDURE — 1036F TOBACCO NON-USER: CPT | Performed by: INTERNAL MEDICINE

## 2020-02-18 RX ORDER — TRAZODONE HYDROCHLORIDE 50 MG/1
TABLET ORAL
Qty: 30 TABLET | Refills: 2 | Status: SHIPPED | OUTPATIENT
Start: 2020-02-18 | End: 2020-04-16

## 2020-02-18 NOTE — ASSESSMENT & PLAN NOTE
History limited d/t aphasia - I think pt is going to have a hard time with any sleep study and d/t this I think he should be seen by Sleep medicine first - referral entered in to Epic

## 2020-02-18 NOTE — PROGRESS NOTES
Assessment/Plan:    Expressive aphasia  History limited d/t aphasia - I think pt is going to have a hard time with any sleep study and d/t this I think he should be seen by Sleep medicine first - referral entered in to Epic    H/O sleep apnea  Currently with snoring/witnessed apnea and insomnia issues, wgt up 30 lbs likely contributing to symptoms, diet/exercise/wgt loss encouraged, d/t aphasia/agitation sleep study is going to be very difficult so I have referred him right to sleep medicine rather then try and order OP sleep study    Insomnia  No benefit with melatonin OTC, sleep hygiene reviewed, will try trial of Trazodone, SE reviewed, re-eval in 6-8 wks       Diagnoses and all orders for this visit:    Insomnia, unspecified type  -     traZODone (DESYREL) 50 mg tablet; 1/2 tab PO 1 hr prior to bedtime x 1 wk - if sleep not better increase to 1 tab PO 1 hr prior to bedtime    H/O sleep apnea  -     Ambulatory referral to Sleep Medicine; Future    Snoring and Witnessed episode of apnea  Comments:  D/t aphasia/agitation sleep study is going to be very difficulty - referral directly to sleep medicine put in prior to sleep study as unsure pt will be able to tolerate the study  Orders:  -     Ambulatory referral to Sleep Medicine; Future    Obesity (BMI 30 0-34  9)    BMI 33 0-33 9,adult  Comments:  Diet/exercise/wgt loss encouraged    Expressive aphasia          Subjective:      Patient ID: Bryant Del Castillo is a 49 y o  male  HPI Pt here with wife with c/o sleeping issues  Pt with expressive aphasia and most of history via pts wife  Apparently he has had issues with sleep for approx 5-6 mos  He seems to fall asleep OK but has more so issues with staying asleep  He goes in to bed at 7 and will watch TV and nod off  His wife notes he does snore and does have some witnessed apnea  He had a sleep study years ago and was on a mask but stopped that a while ago    Wgt up 30 lbs in the past 10 mos - BMI reviewed        Review of Systems   Unable to perform ROS: Other (expressive aphasia)   Constitutional: Positive for fatigue  Respiratory: Negative for cough  Gastrointestinal: Negative for vomiting  Endocrine: Negative for polydipsia  Psychiatric/Behavioral: Positive for agitation and sleep disturbance  Objective:    /72 (BP Location: Left arm, Patient Position: Standing, Cuff Size: Large)   Pulse (!) 107   Temp 97 8 °F (36 6 °C)   Ht 5' 10" (1 778 m)   Wt 105 kg (232 lb)   BMI 33 29 kg/m²        Physical Exam   Constitutional: He appears well-developed and well-nourished  No distress  HENT:   Head: Normocephalic and atraumatic  Mouth/Throat: No oropharyngeal exudate  Neck: Neck supple  No tracheal deviation present  Cardiovascular: Normal rate, regular rhythm and normal heart sounds  No murmur heard  Pulmonary/Chest: Effort normal and breath sounds normal  No respiratory distress  He has no wheezes  He has no rales  Neurological: He is alert  He exhibits normal muscle tone  Expressive aphasia   Skin: Skin is warm and dry  No rash noted  Psychiatric:   Irritable and agitated with wife   Nursing note and vitals reviewed  BMI Counseling: Body mass index is 33 29 kg/m²  The BMI is above normal  Nutrition recommendations include reducing portion sizes, 3-5 servings of fruits/vegetables daily, consuming healthier snacks, moderation in carbohydrate intake, increasing intake of lean protein and reducing intake of saturated fat and trans fat  Exercise recommendations include exercising 3-5 times per week

## 2020-02-18 NOTE — PATIENT INSTRUCTIONS
Obesity   AMBULATORY CARE:   Obesity  is when your body mass index (BMI) is greater than 30  Your healthcare provider will use your height and weight to measure your BMI  The risks of obesity include  many health problems, such as injuries or physical disability  You may need tests to check for the following:  · Diabetes     · High blood pressure or high cholesterol     · Heart disease     · Gallbladder or liver disease     · Cancer of the colon, breast, prostate, liver, or kidney     · Sleep apnea     · Arthritis or gout  Seek care immediately if:   · You have a severe headache, confusion, or difficulty speaking  · You have weakness on one side of your body  · You have chest pain, sweating, or shortness of breath  Contact your healthcare provider if:   · You have symptoms of gallbladder or liver disease, such as pain in your upper abdomen  · You have knee or hip pain and discomfort while walking  · You have symptoms of diabetes, such as intense hunger and thirst, and frequent urination  · You have symptoms of sleep apnea, such as snoring or daytime sleepiness  · You have questions or concerns about your condition or care  Treatment for obesity  focuses on helping you lose weight to improve your health  Even a small decrease in BMI can reduce the risk for many health problems  Your healthcare provider will help you set a weight-loss goal   · Lifestyle changes  are the first step in treating obesity  These include making healthy food choices and getting regular physical activity  Your healthcare provider may suggest a weight-loss program that involves coaching, education, and therapy  · Medicine  may help you lose weight when it is used with a healthy diet and physical activity  · Surgery  can help you lose weight if you are very obese and have other health problems  There are several types of weight-loss surgery  Ask your healthcare provider for more information    Be successful losing weight:   · Set small, realistic goals  An example of a small goal is to walk for 20 minutes 5 days a week  Anther goal is to lose 5% of your body weight  · Tell friends, family members, and coworkers about your goals  and ask for their support  Ask a friend to lose weight with you, or join a weight-loss support group  · Identify foods or triggers that may cause you to overeat , and find ways to avoid them  Remove tempting high-calorie foods from your home and workplace  Place a bowl of fresh fruit on your kitchen counter  If stress causes you to eat, then find other ways to cope with stress  · Keep a diary to track what you eat and drink  Also write down how many minutes of physical activity you do each day  Weigh yourself once a week and record it in your diary  Eating changes: You will need to eat 500 to 1,000 fewer calories each day than you currently eat to lose 1 to 2 pounds a week  The following changes will help you cut calories:  · Eat smaller portions  Use small plates, no larger than 9 inches in diameter  Fill your plate half full of fruits and vegetables  Measure your food using measuring cups until you know what a serving size looks like  · Eat 3 meals and 1 or 2 snacks each day  Plan your meals in advance  [place]  and eat at home most of the time  Eat slowly  · Eat fruits and vegetables at every meal   They are low in calories and high in fiber, which makes you feel full  Do not add butter, margarine, or cream sauce to vegetables  Use herbs to season steamed vegetables  · Eat less fat and fewer fried foods  Eat more baked or grilled chicken and fish  These protein sources are lower in calories and fat than red meat  Limit fast food  Dress your salads with olive oil and vinegar instead of bottled dressing  · Limit the amount of sugar you eat  Do not drink sugary beverages  Limit alcohol  Activity changes:  Physical activity is good for your body in many ways   It helps you burn calories and build strong muscles  It decreases stress and depression, and improves your mood  It can also help you sleep better  Talk to your healthcare provider before you begin an exercise program   · Exercise for at least 30 minutes 5 days a week  Start slowly  Set aside time each day for physical activity that you enjoy and that is convenient for you  It is best to do both weight training and an activity that increases your heart rate, such as walking, bicycling, or swimming  · Find ways to be more active  Do yard work and housecleaning  Walk up the stairs instead of using elevators  Spend your leisure time going to events that require walking, such as outdoor festivals or fairs  This extra physical activity can help you lose weight and keep it off  Follow up with your healthcare provider as directed: You may need to meet with a dietitian  Write down your questions so you remember to ask them during your visits  © 2017 [employer] [employer] Information is for End User's use only and may not be sold, redistributed or otherwise used for commercial purposes  All illustrations and images included in CareNotes® are the copyrighted property of [employer]  or [employer]  The above information is an  only  It is not intended as medical advice for individual conditions or treatments  Talk to your doctor, nurse or pharmacist before following any medical regimen to see if it is safe and effective for you  Weight Management   AMBULATORY CARE:   Why it is important to manage your weight:  Being overweight increases your risk of health conditions such as heart disease, high blood pressure, type 2 diabetes, and certain types of cancer  It can also increase your risk for osteoarthritis, sleep apnea, and other respiratory problems  Aim for a slow, steady weight loss  Even a small amount of weight loss can lower your risk of health problems    How to lose weight safely:  A safe and healthy way to lose weight is to eat fewer calories and get regular exercise  You can lose up about 1 pound a week by decreasing the number of calories you eat by 500 calories each day  You can decrease calories by eating smaller portion sizes or by cutting out high-calorie foods  Read labels to find out how many calories are in the foods you eat  You can also burn calories with exercise such as walking, swimming, or biking  You will be more likely to keep weight off if you make these changes part of your lifestyle  Healthy meal plan for weight management:  A healthy meal plan includes a variety of foods, contains fewer calories, and helps you stay healthy  A healthy meal plan includes the following:  · Eat whole-grain foods more often  A healthy meal plan should contain fiber  Fiber is the part of grains, fruits, and vegetables that is not broken down by your body  Whole-grain foods are healthy and provide extra fiber in your diet  Some examples of whole-grain foods are whole-wheat breads and pastas, oatmeal, brown rice, and bulgur  · Eat a variety of vegetables every day  Include dark, leafy greens such as spinach, kale, susan greens, and mustard greens  Eat yellow and orange vegetables such as carrots, sweet potatoes, and winter squash  · Eat a variety of fruits every day  Choose fresh or canned fruit (canned in its own juice or light syrup) instead of juice  Fruit juice has very little or no fiber  · Eat low-fat dairy foods  Drink fat-free (skim) milk or 1% milk  Eat fat-free yogurt and low-fat cottage cheese  Try low-fat cheeses such as mozzarella and other reduced-fat cheeses  · Choose meat and other protein foods that are low in fat  Choose beans or other legumes such as split peas or lentils  Choose fish, skinless poultry (chicken or turkey), or lean cuts of red meat (beef or pork)  Before you cook meat or poultry, cut off any visible fat  · Use less fat and oil  Try baking foods instead of frying them  Add less fat, such as margarine, sour cream, regular salad dressing and mayonnaise to foods  Eat fewer high-fat foods  Some examples of high-fat foods include french fries, doughnuts, ice cream, and cakes  · Eat fewer sweets  Limit foods and drinks that are high in sugar  This includes candy, cookies, regular soda, and sweetened drinks  Ways to decrease calories:   · Eat smaller portions  ¨ Use a small plate with smaller servings  ¨ Do not eat second helpings  ¨ When you eat at a restaurant, ask for a box and place half of your meal in the box before you eat  ¨ Share an entrée with someone else  · Replace high-calorie snacks with healthy, low-calorie snacks  ¨ Choose fresh fruit, vegetables, fat-free rice cakes, or air-popped popcorn instead of potato chips, nuts, or chocolate  ¨ Choose water or calorie-free drinks instead of soda or sweetened drinks  · Eat regular meals  Skipping meals can lead to overeating later in the day  Eat a healthy snack in place of a meal if you do not have time to eat a regular meal      · Do not shop for groceries when you are hungry  You may be more likely to make unhealthy food choices  Take a grocery list of healthy foods and shop after you have eaten  Exercise:  Exercise at least 30 minutes per day on most days of the week  Some examples of exercise include walking, biking, dancing, and swimming  You can also fit in more physical activity by taking the stairs instead of the elevator or parking farther away from stores  Ask your healthcare provider about the best exercise plan for you  Other things to consider as you try to lose weight:   · Be aware of situations that may give you the urge to overeat, such as eating while watching television  Find ways to avoid these situations  For example, read a book, go for a walk, or do crafts      · Meet with a weight loss support group or friends who are also trying to lose weight  This may help you stay motivated to continue working on your weight loss goals  © 2017 [employer] Information is for End User's use only and may not be sold, redistributed or otherwise used for commercial purposes  All illustrations and images included in CareNotes® are the copyrighted property of [employer]  or [employer]  The above information is an  only  It is not intended as medical advice for individual conditions or treatments  Talk to your doctor, nurse or pharmacist before following any medical regimen to see if it is safe and effective for you  Heart Healthy Diet   AMBULATORY CARE:   A heart healthy diet  is an eating plan low in total fat, unhealthy fats, and sodium (salt)  A heart healthy diet helps decrease your risk for heart disease and stroke  Limit the amount of fat you eat to 25% to 35% of your total daily calories  Limit sodium to less than 2,300 mg each day  Healthy fats:  Healthy fats can help improve cholesterol levels  The risk for heart disease is decreased when cholesterol levels are normal  Choose healthy fats, such as the following:  · Unsaturated fat  is found in foods such as soybean, canola, olive, corn, and safflower oils  It is also found in soft tub margarine that is made with liquid vegetable oil  · Omega-3 fat  is found in certain fish, such as salmon, tuna, and trout, and in walnuts and flaxseed  Unhealthy fats:  Unhealthy fats can cause unhealthy cholesterol levels in your blood and increase your risk of heart disease  Limit unhealthy fats, such as the following:  · Cholesterol  is found in animal foods, such as eggs and lobster, and in dairy products made from whole milk  Limit cholesterol to less than 300 milligrams (mg) each day  You may need to limit cholesterol to 200 mg each day if you have heart disease  · Saturated fat  is found in meats, such as rogers and hamburger   It is also found in chicken or turkey skin, whole milk, and butter  Limit saturated fat to less than 7% of your total daily calories  Limit saturated fat to less than 6% if you have heart disease or are at increased risk for it  · Trans fat  is found in packaged foods, such as potato chips and cookies  It is also in hard margarine, some fried foods, and shortening  Avoid trans fats as much as possible    Heart healthy foods and drinks to include:  Ask your dietitian or healthcare provider how many servings to have from each of the following food groups:  · Grains:      ¨ Whole-wheat breads, cereals, and pastas, and brown rice    ¨ Low-fat, low-sodium crackers and chips    · Vegetables:      ¨ Broccoli, green beans, green peas, and spinach    ¨ Collards, kale, and lima beans    ¨ Carrots, sweet potatoes, tomatoes, and peppers    ¨ Canned vegetables with no salt added    · Fruits:      ¨ Bananas, peaches, pears, and pineapple    ¨ Grapes, raisins, and dates    ¨ Oranges, tangerines, grapefruit, orange juice, and grapefruit juice    ¨ Apricots, mangoes, melons, and papaya    ¨ Raspberries and strawberries    ¨ Canned fruit with no added sugar    · Low-fat dairy products:      ¨ Nonfat (skim) milk, 1% milk, and low-fat almond, cashew, or soy milks fortified with calcium    ¨ Low-fat cheese, regular or frozen yogurt, and cottage cheese    · Meats and proteins , such as lean cuts of beef and pork (loin, leg, round), skinless chicken and turkey, legumes, soy products, egg whites, and nuts  Foods and drinks to limit or avoid:  Ask your dietitian or healthcare provider about these and other foods that are high in unhealthy fat, sodium, and sugar:  · Snack or packaged foods , such as frozen dinners, cookies, macaroni and cheese, and cereals with more than 300 mg of sodium per serving    · Canned or dry mixes  for cakes, soups, sauces, or gravies    · Vegetables with added sodium , such as instant potatoes, vegetables with added sauces, or regular canned vegetables    · Other foods high in sodium , such as ketchup, barbecue sauce, salad dressing, pickles, olives, soy sauce, and miso    · High-fat dairy foods  such as whole or 2% milk, cream cheese, or sour cream, and cheeses     · High-fat protein foods  such as high-fat cuts of beef (T-bone steaks, ribs), chicken or turkey with skin, and organ meats, such as liver    · Cured or smoked meats , such as hot dogs, rogers, and sausage    · Unhealthy fats and oils , such as butter, stick margarine, shortening, and cooking oils such as coconut or palm oil    · Food and drinks high in sugar , such as soft drinks (soda), sports drinks, sweetened tea, candy, cake, cookies, pies, and doughnuts  Other diet guidelines to follow:   · Eat more foods containing omega-3 fats  Eat fish high in omega-3 fats at least 2 times a week  · Limit alcohol  Too much alcohol can damage your heart and raise your blood pressure  Women should limit alcohol to 1 drink a day  Men should limit alcohol to 2 drinks a day  A drink of alcohol is 12 ounces of beer, 5 ounces of wine, or 1½ ounces of liquor  · Choose low-sodium foods  High-sodium foods can lead to high blood pressure  Add little or no salt to food you prepare  Use herbs and spices in place of salt  · Eat more fiber  to help lower cholesterol levels  Eat at least 5 servings of fruits and vegetables each day  Eat 3 ounces of whole-grain foods each day  Legumes (beans) are also a good source of fiber  Lifestyle guidelines:   · Do not smoke  Nicotine and other chemicals in cigarettes and cigars can cause lung and heart damage  Ask your healthcare provider for information if you currently smoke and need help to quit  E-cigarettes or smokeless tobacco still contain nicotine  Talk to your healthcare provider before you use these products  · Exercise regularly  to help you maintain a healthy weight and improve your blood pressure and cholesterol levels   Ask your healthcare provider about the best exercise plan for you  Do not start an exercise program without asking your healthcare provider  Follow up with your healthcare provider as directed:  Write down your questions so you remember to ask them during your visits  © 2017 [employer] Information is for End User's use only and may not be sold, redistributed or otherwise used for commercial purposes  All illustrations and images included in CareNotes® are the copyrighted property of [employer]  or [employer]  The above information is an  only  It is not intended as medical advice for individual conditions or treatments  Talk to your doctor, nurse or pharmacist before following any medical regimen to see if it is safe and effective for you

## 2020-02-18 NOTE — ASSESSMENT & PLAN NOTE
Currently with snoring/witnessed apnea and insomnia issues, wgt up 30 lbs likely contributing to symptoms, diet/exercise/wgt loss encouraged, d/t aphasia/agitation sleep study is going to be very difficult so I have referred him right to sleep medicine rather then try and order OP sleep study

## 2020-02-18 NOTE — ASSESSMENT & PLAN NOTE
No benefit with melatonin OTC, sleep hygiene reviewed, will try trial of Trazodone, SE reviewed, re-eval in 6-8 wks

## 2020-03-05 ENCOUNTER — TELEPHONE (OUTPATIENT)
Dept: FAMILY MEDICINE CLINIC | Facility: HOSPITAL | Age: 55
End: 2020-03-05

## 2020-03-05 NOTE — TELEPHONE ENCOUNTER
PATIENT HAS CYST ON BACK - HE HAS AN APPT WITH DR RIVERA ON MONDAY TO HAVE IT TAKEN CARE OF BUT HE NEEDS TO START AN ABX - PLEASE SEND TO [facility] - PLEASE CALL BACK WIFE WHEN COMPLETE

## 2020-03-12 ENCOUNTER — OFFICE VISIT (OUTPATIENT)
Dept: SURGERY | Facility: HOSPITAL | Age: 55
End: 2020-03-12
Payer: COMMERCIAL

## 2020-03-12 VITALS — TEMPERATURE: 97.5 F | DIASTOLIC BLOOD PRESSURE: 99 MMHG | SYSTOLIC BLOOD PRESSURE: 153 MMHG | HEART RATE: 103 BPM

## 2020-03-12 DIAGNOSIS — L72.3 SEBACEOUS CYST: ICD-10-CM

## 2020-03-12 DIAGNOSIS — L02.91 ABSCESS: Primary | ICD-10-CM

## 2020-03-12 PROCEDURE — 99213 OFFICE O/P EST LOW 20 MIN: CPT | Performed by: SURGERY

## 2020-03-12 PROCEDURE — 1036F TOBACCO NON-USER: CPT | Performed by: SURGERY

## 2020-03-12 RX ORDER — CEPHALEXIN 500 MG/1
500 CAPSULE ORAL 4 TIMES DAILY
Qty: 20 CAPSULE | Refills: 0 | Status: SHIPPED | OUTPATIENT
Start: 2020-03-12 | End: 2020-03-17

## 2020-03-12 NOTE — PROGRESS NOTES
Assessment/Plan: April Oakes  Deonna Guevara is a 49year old male who presents today regarding sebaceous cyst of right back  Physical exam revealed inflamed sebaceous cyst of the right back  Discussed risks, benefits, and alternatives to excision of cyst   Explained the procedure as well as pre and post procedural protocols  He should let the inflammation continue to resolve prior to having the cyst excised  I will see him back in two weeks to re-evalute the cyst and we will plan for excision at that time  No problem-specific Assessment & Plan notes found for this encounter  There are no diagnoses linked to this encounter  Subjective:      Patient ID: Bridget Prescott is a 49 y o  male  Maggy Romeo II is a 49year old male who presents today regarding an infected sebaceous cyst of right back  Patient is with expressive aphasia and most of the history was obtained via his son  He has had the cyst for over 2 years  His son stated that the cyst was infected recently but now is doing better  Patient was first seen in the office last year  At that time the cyst was infected once again and we told him to follow up, however he never called the office until recently  The following portions of the patient's history were reviewed and updated as appropriate: allergies, current medications, past family history, past medical history, past social history, past surgical history and problem list     Review of Systems   Constitutional: Negative  HENT: Negative  Eyes: Negative  Respiratory: Negative  Cardiovascular: Negative  Gastrointestinal: Negative  Negative for abdominal pain  Endocrine: Negative  Genitourinary: Negative  Musculoskeletal: Negative  Skin: Negative  Allergic/Immunologic: Negative  Neurological: Negative  Hematological: Negative  Psychiatric/Behavioral: Negative  Objective:       There were no vitals taken for this visit          Physical Exam   Constitutional: He is oriented to person, place, and time  He appears well-developed and well-nourished  No distress  HENT:   Head: Normocephalic and atraumatic  Right Ear: External ear normal    Left Ear: External ear normal    Nose: Nose normal    Mouth/Throat: Oropharynx is clear and moist    Eyes: Pupils are equal, round, and reactive to light  Conjunctivae and EOM are normal    Neck: Normal range of motion  Neck supple  No JVD present  No tracheal deviation present  No thyromegaly present  Cardiovascular: Normal rate, regular rhythm, normal heart sounds and intact distal pulses  Exam reveals no gallop and no friction rub  No murmur heard  Pulmonary/Chest: Effort normal and breath sounds normal  No stridor  No respiratory distress  He has no wheezes  He has no rales  He exhibits no tenderness  Musculoskeletal: Normal range of motion  He exhibits no edema, tenderness or deformity  Lymphadenopathy:     He has no cervical adenopathy  Neurological: He is alert and oriented to person, place, and time  No cranial nerve deficit  Coordination normal    Skin: Skin is warm and dry  No rash noted  He is not diaphoretic  No erythema  No pallor  Inflamed sebaceous cyst of the right back  Psychiatric: He has a normal mood and affect  His behavior is normal  Judgment and thought content normal    Nursing note and vitals reviewed  By signing my name below, Shelley Shankar, attwendi that this documentation has been prepared under the direction and in the presence of Tung Turner MD  Electronically Signed: Dawson Beth  3/12/20  [facility], personally performed the services described in this documentation  All medical record entries made by the dawson were at my direction and in my presence  I have reviewed the chart and discharge instructions and agree that the record reflects my personal performance and is accurate and complete   Smith Border Black Man MD  3/12/20

## 2020-04-16 ENCOUNTER — OFFICE VISIT (OUTPATIENT)
Dept: FAMILY MEDICINE CLINIC | Facility: HOSPITAL | Age: 55
End: 2020-04-16
Payer: COMMERCIAL

## 2020-04-16 VITALS
HEIGHT: 70 IN | SYSTOLIC BLOOD PRESSURE: 140 MMHG | WEIGHT: 236.4 LBS | DIASTOLIC BLOOD PRESSURE: 92 MMHG | HEART RATE: 100 BPM | TEMPERATURE: 98.7 F | BODY MASS INDEX: 33.84 KG/M2

## 2020-04-16 DIAGNOSIS — H61.23 BILATERAL IMPACTED CERUMEN: ICD-10-CM

## 2020-04-16 DIAGNOSIS — R47.01 EXPRESSIVE APHASIA: ICD-10-CM

## 2020-04-16 DIAGNOSIS — G47.00 INSOMNIA, UNSPECIFIED TYPE: ICD-10-CM

## 2020-04-16 DIAGNOSIS — I63.312 STROKE DUE TO THROMBOSIS OF LEFT MIDDLE CEREBRAL ARTERY (HCC): Primary | ICD-10-CM

## 2020-04-16 PROCEDURE — 69209 REMOVE IMPACTED EAR WAX UNI: CPT | Performed by: INTERNAL MEDICINE

## 2020-04-16 PROCEDURE — 1036F TOBACCO NON-USER: CPT | Performed by: INTERNAL MEDICINE

## 2020-04-16 PROCEDURE — 3008F BODY MASS INDEX DOCD: CPT | Performed by: INTERNAL MEDICINE

## 2020-04-16 PROCEDURE — 99214 OFFICE O/P EST MOD 30 MIN: CPT | Performed by: INTERNAL MEDICINE

## 2020-05-27 ENCOUNTER — OFFICE VISIT (OUTPATIENT)
Dept: SLEEP CENTER | Facility: CLINIC | Age: 55
End: 2020-05-27
Payer: COMMERCIAL

## 2020-05-27 VITALS
HEIGHT: 72 IN | DIASTOLIC BLOOD PRESSURE: 88 MMHG | BODY MASS INDEX: 32.21 KG/M2 | HEART RATE: 105 BPM | SYSTOLIC BLOOD PRESSURE: 124 MMHG | WEIGHT: 237.8 LBS

## 2020-05-27 DIAGNOSIS — Z86.69 H/O SLEEP APNEA: ICD-10-CM

## 2020-05-27 DIAGNOSIS — R06.83 SNORING: ICD-10-CM

## 2020-05-27 DIAGNOSIS — R06.81 WITNESSED EPISODE OF APNEA: ICD-10-CM

## 2020-05-27 PROCEDURE — 99214 OFFICE O/P EST MOD 30 MIN: CPT | Performed by: INTERNAL MEDICINE

## 2020-05-27 PROCEDURE — 3008F BODY MASS INDEX DOCD: CPT | Performed by: INTERNAL MEDICINE

## 2020-05-27 PROCEDURE — 1036F TOBACCO NON-USER: CPT | Performed by: INTERNAL MEDICINE

## 2020-05-27 RX ORDER — DIPHENOXYLATE HYDROCHLORIDE AND ATROPINE SULFATE 2.5; .025 MG/1; MG/1
1 TABLET ORAL DAILY
COMMUNITY

## 2020-06-11 ENCOUNTER — OFFICE VISIT (OUTPATIENT)
Dept: SURGERY | Facility: HOSPITAL | Age: 55
End: 2020-06-11
Payer: COMMERCIAL

## 2020-06-11 VITALS
DIASTOLIC BLOOD PRESSURE: 90 MMHG | SYSTOLIC BLOOD PRESSURE: 124 MMHG | HEART RATE: 101 BPM | WEIGHT: 230.6 LBS | TEMPERATURE: 97.6 F | RESPIRATION RATE: 16 BRPM | HEIGHT: 72 IN | BODY MASS INDEX: 31.23 KG/M2

## 2020-06-11 DIAGNOSIS — L72.3 SEBACEOUS CYST: Primary | ICD-10-CM

## 2020-06-11 PROCEDURE — 1036F TOBACCO NON-USER: CPT | Performed by: SURGERY

## 2020-06-11 PROCEDURE — 99213 OFFICE O/P EST LOW 20 MIN: CPT | Performed by: SURGERY

## 2020-07-30 ENCOUNTER — OFFICE VISIT (OUTPATIENT)
Dept: FAMILY MEDICINE CLINIC | Facility: HOSPITAL | Age: 55
End: 2020-07-30
Payer: COMMERCIAL

## 2020-07-30 VITALS
DIASTOLIC BLOOD PRESSURE: 96 MMHG | HEART RATE: 110 BPM | TEMPERATURE: 96.9 F | WEIGHT: 232.6 LBS | BODY MASS INDEX: 31.51 KG/M2 | HEIGHT: 72 IN | SYSTOLIC BLOOD PRESSURE: 126 MMHG

## 2020-07-30 DIAGNOSIS — M54.2 CERVICALGIA: Primary | ICD-10-CM

## 2020-07-30 DIAGNOSIS — M47.812 OSTEOARTHRITIS OF CERVICAL SPINE, UNSPECIFIED SPINAL OSTEOARTHRITIS COMPLICATION STATUS: ICD-10-CM

## 2020-07-30 PROCEDURE — 3008F BODY MASS INDEX DOCD: CPT | Performed by: INTERNAL MEDICINE

## 2020-07-30 PROCEDURE — 1036F TOBACCO NON-USER: CPT | Performed by: INTERNAL MEDICINE

## 2020-07-30 PROCEDURE — 99214 OFFICE O/P EST MOD 30 MIN: CPT | Performed by: INTERNAL MEDICINE

## 2020-07-30 NOTE — PROGRESS NOTES
Assessment/Plan:    Osteoarthritis of cervical spine  MRI and Xray from 2019 reviewed, symptoms don't appear radicular at this time but history difficult to obtain, monitor and try OTC tx and chiropractor, f/u in 1 mo if not better and will need to consider repeat MRI C-spine       Diagnoses and all orders for this visit:    Cervicalgia  Comments:  Appears acute on chronic neck pain - Xray & MRI of C-spine reviewed from 2019 - dz more severe on L on imaging, exam essentially nml today except dec rotation to R, advised heat/massage/Tylenol or Ibuprofen and return to chiropractor, call if not better in 4 wks and would consider repeat imaging of C-spine, call with new/worse symptoms    Osteoarthritis of cervical spine, unspecified spinal osteoarthritis complication status          Subjective:      Patient ID: Mary Beth Dobbs is a 48 y o  male  HPI Pt here with his son with c/o neck pain  Pt with expressive aphasia and history via pt and son  Pain appears to be R side of lower cervical region and appeared approx 2 wks  He and family note no fall/injury/new activity provoking the pain  He has had neck pain in the past and had Xrays and MRI of cervical spine in 2019  He appears to point as if the pain goes down his RUE  He denies numbness/tingling  He has not tried anything for the symptoms  Review of Systems   Constitutional: Negative for chills and fever  Musculoskeletal: Positive for neck pain  Skin: Negative for rash  Neurological: Negative for weakness and numbness  Objective:    /96   Pulse (!) 110   Temp (!) 96 9 °F (36 1 °C)   Ht 6' (1 829 m)   Wt 106 kg (232 lb 9 6 oz)   BMI 31 55 kg/m²      Physical Exam   Constitutional: He appears well-developed and well-nourished  No distress  Cardiovascular: Normal rate, regular rhythm and normal heart sounds  No murmur heard  Pulmonary/Chest: Effort normal and breath sounds normal  No respiratory distress   He has no wheezes  He has no rales  Musculoskeletal:   nml gait w/o assistance  Cervical spine: no pain with palp of spinous processes of cervical spine, + hypertonicity to R paraspinal/trapezius musculature region, dec in rotation to R otherwise  nml AROM, 5/5 B/L UE muscle strength   Neurological: He is alert  He displays normal reflexes  No sensory deficit  Skin: Skin is warm and dry  No rash noted  Psychiatric:   Expressive aphasia, counts "1,2,3,4,5" as answers to most questions   Nursing note and vitals reviewed

## 2020-07-30 NOTE — ASSESSMENT & PLAN NOTE
MRI and Xray from 2019 reviewed, symptoms don't appear radicular at this time but history difficult to obtain, monitor and try OTC tx and chiropractor, f/u in 1 mo if not better and will need to consider repeat MRI C-spine

## 2020-08-03 NOTE — PROGRESS NOTES
Assessment/Plan:   Mary Beth Dobbs is a 48 y o male who is here for Cyst (Recheck cyst right mid outer back area)    Plan: Sebaceous cyst - discussed operative approaches vs conservative mgt, patient has decided to not proceed with excision of back cyst and understands the risk of recurrent infection    Preoperative Clearance: None    HPI:  Mary Beth Dobbs is a 48 y o male who was referred for evaluation of Cyst (Recheck cyst right mid outer back area)    Currently not having any symptoms and area is well healed  ROS:  General ROS: negative  negative for - chills, fatigue, fever or night sweats, weight loss  Respiratory ROS: no cough, shortness of breath, or wheezing  Cardiovascular ROS: no chest pain or dyspnea on exertion  Genito-Urinary ROS: no dysuria, trouble voiding, or hematuria  Musculoskeletal ROS: negative for - gait disturbance, joint pain or muscle pain  Neurological ROS: no TIA or stroke symptoms    PROBLIST@  Patient has no known allergies  Current Outpatient Medications:     multivitamin (THERAGRAN) TABS, Take 1 tablet by mouth daily, Disp: , Rfl:   Past Medical History:   Diagnosis Date    Arthritis     Stroke [facility])      Past Surgical History:   Procedure Laterality Date    CARDIAC CATHETERIZATION      CARDIAC SURGERY      TISSUE PLASMINOGEN ACTIVATOR (TPA), EIA(HISTORICAL)       Family History   Problem Relation Age of Onset    COPD Mother     Diabetes Mother     Alzheimer's disease Mother     No Known Problems Father     Asthma Child       reports that he quit smoking about 19 months ago  His smoking use included cigarettes  He has never used smokeless tobacco  He reports previous alcohol use  He reports that he does not use drugs      Labs:   Lab Results   Component Value Date    WBC 6 5 06/07/2018    HGB 17 1 06/07/2018     06/07/2018     Lab Results   Component Value Date    ALT 15 06/07/2018    AST 14 06/07/2018     This SmartLink has not been configured with any valid records  PHYSICAL EXAM  General Appearance:    Alert, cooperative, no distress   Head:    Normocephalic without obvious abnormality   Eyes:    PERRL, conjunctiva/corneas clear, EOM's intact        Neck:   Supple, no adenopathy, no JVD   Back:     Symmetric, no spinal or CVA tenderness   Lungs:     Clear to auscultation bilaterally, no wheezing or rhonchi   Heart:    Regular rate and rhythm, S1 and S2 normal, no murmur   Abdomen:     Soft +BS ND NT   Extremities:   Extremities normal  No clubbing, cyanosis or edema   Psych:   Normal Affect, AOx3  Neurologic:  Skin:   CNII-XII intact  Strength symmetric, speech intact    Warm, dry, intact, well healed I+D site, seb cyst stable         Physical Exam              Some portions of this record may have been generated with voice recognition software  There may be translation, syntax,  or grammatical errors  Occasional wrong word or "sound-a-like" substitutions may have occurred due to the inherent limitations of the voice recognition software  Read the chart carefully and recognize, using context, where substitutions may have occurred  If you have any questions, please contact the dictating provider for clarification or correction, as needed  This encounter has been coded by a non-certified coder

## 2020-12-28 ENCOUNTER — IMMUNIZATIONS (OUTPATIENT)
Dept: FAMILY MEDICINE CLINIC | Facility: HOSPITAL | Age: 55
End: 2020-12-28
Payer: COMMERCIAL

## 2020-12-28 ENCOUNTER — TELEPHONE (OUTPATIENT)
Dept: FAMILY MEDICINE CLINIC | Facility: HOSPITAL | Age: 55
End: 2020-12-28

## 2020-12-28 DIAGNOSIS — I63.312 STROKE DUE TO THROMBOSIS OF LEFT MIDDLE CEREBRAL ARTERY (HCC): Primary | ICD-10-CM

## 2020-12-28 DIAGNOSIS — Z23 ENCOUNTER FOR IMMUNIZATION: ICD-10-CM

## 2020-12-28 PROCEDURE — 90471 IMMUNIZATION ADMIN: CPT | Performed by: FAMILY MEDICINE

## 2020-12-28 PROCEDURE — 90682 RIV4 VACC RECOMBINANT DNA IM: CPT | Performed by: FAMILY MEDICINE

## 2021-01-05 ENCOUNTER — EVALUATION (OUTPATIENT)
Dept: PHYSICAL THERAPY | Facility: CLINIC | Age: 56
End: 2021-01-05
Payer: COMMERCIAL

## 2021-01-05 DIAGNOSIS — M25.511 RIGHT SHOULDER PAIN, UNSPECIFIED CHRONICITY: Primary | ICD-10-CM

## 2021-01-05 DIAGNOSIS — I63.312 STROKE DUE TO THROMBOSIS OF LEFT MIDDLE CEREBRAL ARTERY (HCC): ICD-10-CM

## 2021-01-05 PROCEDURE — 97162 PT EVAL MOD COMPLEX 30 MIN: CPT

## 2021-01-05 NOTE — PROGRESS NOTES
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)  [id number] Ambulatory referral to Physical Therapy       Start Time: 315  Stop Time: 400  Total time in clinic (min): 45 minutes    Assessment  Assessment details: Patient presents to PT with 2 month history of right shoulder pain  Patient has history of stroke with expressive aphasia accompanied by wife  Pain is noted around right shoulder girdle, however, no restriction is noted with tolerance to resistance  Slightly decreased scapular-humeral rhythm noted due to stroke right  No balance or gait restrictions noted with SR revealing 52/56 score  Despite right visual field cut, no veering or difficulty negotiating around obstacles noted  Increased soft tissue density noted with increased point tenderness noted particularly over sight of right scapular cyst   Per wife, they were going to pursue having the cyst removed prior to [facility]  Due to possibility of cyst contributing to present pain, recommend follow up with MD   Patient will benefit from skilled intervention for shoulder ROM, re-training and soft tissue manuals to alleviate pain  Patient is anticipated to return to [facility] for OT, Speech and Cognitive rehab  Given patient has a jarrod to receive treatment through this facility, recommend OT address shoulder restrictions and pain  Patient is tor return next week  PT will keep this case open and contact wife in 2 weeks to ascertain if right shoulder treatment has resumed with OT  If difficulty addressing these restrictions through [facility] is noted, PT will initiate treatment with goals and plans to follow      Thank you for this referral   Impairments: abnormal or restricted ROM, activity intolerance and pain with function    Goals  STG  (3-4 weeks)  Follow up with wife to determine if treatment for right shoulder pain has been initiated with OT through [address] [place] [address] Drive discussed with: patient and family        Subjective Evaluation    History of Present Illness  Mechanism of injury: March of 2019 patient had a 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  Patient underwent open heart surgery to remove the tumor  Patient underwent inpatient rehab for one week and returned home  Patient received a jarrod for Speech, cognitive and OT in [facility] attending 3 days a week prior to [facility]  Expressive aphasia noted with frequent referrsal of yes and no  Despite verbal limitations, patient is able to indicate he has been experiencing right shoulder pain noted around the shoulder girdle for the past 2 months  Per wife, patient has no balance issues with no recent falls or near falls noted  Right sided visual field cut also evident post stroke, per wife this has not resulted in any veering or gait disturbances  While pain is noted, no scale is able to be obtained due to cognitive issues  Pain  Location: Right shoulder pain    Social Support  Lives with: spouse    Employment status: not working  Treatments  Previous treatment: speech therapy and occupational therapy  Patient Goals  Patient goals for therapy: decreased pain          Objective     Neurological Testing     Additional Neurological Details  While verbal limitations do not allow for formal testing, patient is able to respond to touch without limitations noted      Strength/Myotome Testing     Left Shoulder     Planes of Motion   Flexion: 5   Abduction: 5     Right Shoulder     Planes of Motion   Flexion: 5   Abduction: 5     Left Elbow   Flexion: 5  Extension: 5    Right Elbow   Flexion: 5  Extension: 5    Left Wrist/Hand   Wrist extension: 5  Wrist flexion: 5    Right Wrist/Hand Wrist extension: 5  Wrist flexion: 5    Left Hip   Planes of Motion   Flexion: 5  Extension: 5  Abduction: 5  External rotation: 5    Right Hip   Planes of Motion   Flexion: 5  Extension: 5  Abduction: 5  External rotation: 5    Left Knee   Flexion: 5  Extension: 5    Right Knee   Flexion: 5  Extension: 5    Left Ankle/Foot   Dorsiflexion: 5  Plantar flexion: 5    Right Ankle/Foot   Dorsiflexion: 5  Plantar flexion: 5  Neuro Exam:     Coordination   Rapid alternating movements: UE impaired and LE impaired    Functional outcomes   Functional outcome gait comment: Normalized gait despite right visual field cut, no veering or loss of balance is noted        Flowsheet Rows      Most Recent Value   PT/OT G-Codes   Current Score  59   Projected Score  62   FOTO information reviewed  Yes   Assessment Type  Evaluation & Discharge same visit   G code set  Carrying, Moving & Handling Objects        SR/56         Precautions: expressive aphasia, h/o stroke, h/o cardiac tumor, h/o right posterior scapular area cyst  EPOC:  3/2/2021      Manuals                                                                 Neuro Re-Ed                                                                                                        Ther Ex                                                                                                                     Ther Activity                                       Gait Training                                       Modalities

## 2021-01-06 ENCOUNTER — TELEPHONE (OUTPATIENT)
Dept: FAMILY MEDICINE CLINIC | Facility: HOSPITAL | Age: 56
End: 2021-01-06

## 2021-01-06 NOTE — TELEPHONE ENCOUNTER
Pt had a back cyst that ruptured  Saw general surgeon maybe feb or march of 2020, was a gaping hole in back, could not be removed at the time because of this, had to heal first      Cyst healed and they wanted to remove it but patient refused  Has some cognitive issues, wife doesn't have poa and cant force him to do anything    Pt saw physical therapy last night and they advised he have the cyst removed, very hard cyst, he still refuses  So wife would like our recommendation, should he see gen surgeon/specialist/Salima? Any advice?

## 2021-02-04 NOTE — PROGRESS NOTES
PT unable to connect with patient's wife, anticipate patient is pursuing treatment through jarrod funding  Patient is discharged at this time

## 2021-03-10 ENCOUNTER — TELEPHONE (OUTPATIENT)
Dept: FAMILY MEDICINE CLINIC | Facility: HOSPITAL | Age: 56
End: 2021-03-10

## 2021-03-10 NOTE — TELEPHONE ENCOUNTER
[facility] is being difficult about continuing to see patient  Wife is asking if there is somewhere else she could be taking him?  PCB

## 2021-03-11 NOTE — TELEPHONE ENCOUNTER
Spoke to wife  She needs a procedure code for cognitive therapy in order to find help for him  She also needs a procedure code for day therapy

## 2021-03-11 NOTE — TELEPHONE ENCOUNTER
I have no further information about rehab facilities - can ask Neuro if they have a recommendation or they can try any one of them that was recommended to them as well

## 2021-03-11 NOTE — TELEPHONE ENCOUNTER
CPT code for cognitive therapy [id number] and [phone]  CPT code for day therapy 63947    LM for wife to call office

## 2021-03-23 ENCOUNTER — TELEPHONE (OUTPATIENT)
Dept: FAMILY MEDICINE CLINIC | Facility: HOSPITAL | Age: 56
End: 2021-03-23

## 2021-03-23 DIAGNOSIS — R47.01 EXPRESSIVE APHASIA: Primary | ICD-10-CM

## 2021-03-23 DIAGNOSIS — I69.320 APHASIA AS LATE EFFECT OF CEREBROVASCULAR ACCIDENT: ICD-10-CM

## 2021-04-13 DIAGNOSIS — Z23 ENCOUNTER FOR IMMUNIZATION: ICD-10-CM

## 2021-05-18 ENCOUNTER — VBI (OUTPATIENT)
Dept: ADMINISTRATIVE | Facility: OTHER | Age: 56
End: 2021-05-18

## 2021-05-27 ENCOUNTER — VBI (OUTPATIENT)
Dept: FAMILY MEDICINE CLINIC | Facility: HOSPITAL | Age: 56
End: 2021-05-27

## 2021-11-18 ENCOUNTER — OFFICE VISIT (OUTPATIENT)
Dept: FAMILY MEDICINE CLINIC | Facility: HOSPITAL | Age: 56
End: 2021-11-18
Payer: COMMERCIAL

## 2021-11-18 VITALS
HEIGHT: 72 IN | HEART RATE: 98 BPM | WEIGHT: 233.4 LBS | BODY MASS INDEX: 31.61 KG/M2 | DIASTOLIC BLOOD PRESSURE: 102 MMHG | SYSTOLIC BLOOD PRESSURE: 134 MMHG | TEMPERATURE: 97.4 F

## 2021-11-18 DIAGNOSIS — R63.8 DECREASED ORAL INTAKE: ICD-10-CM

## 2021-11-18 DIAGNOSIS — G89.29 CHRONIC LEFT-SIDED LOW BACK PAIN WITHOUT SCIATICA: ICD-10-CM

## 2021-11-18 DIAGNOSIS — H92.03 EARACHE SYMPTOMS IN BOTH EARS: Primary | ICD-10-CM

## 2021-11-18 DIAGNOSIS — R73.9 HYPERGLYCEMIA: ICD-10-CM

## 2021-11-18 DIAGNOSIS — Z12.5 PROSTATE CANCER SCREENING: ICD-10-CM

## 2021-11-18 DIAGNOSIS — E78.1 ESSENTIAL HYPERTRIGLYCERIDEMIA: ICD-10-CM

## 2021-11-18 DIAGNOSIS — M54.50 CHRONIC LEFT-SIDED LOW BACK PAIN WITHOUT SCIATICA: ICD-10-CM

## 2021-11-18 PROCEDURE — 1036F TOBACCO NON-USER: CPT | Performed by: INTERNAL MEDICINE

## 2021-11-18 PROCEDURE — 3725F SCREEN DEPRESSION PERFORMED: CPT | Performed by: INTERNAL MEDICINE

## 2021-11-18 PROCEDURE — 99214 OFFICE O/P EST MOD 30 MIN: CPT | Performed by: INTERNAL MEDICINE

## 2021-11-30 ENCOUNTER — OFFICE VISIT (OUTPATIENT)
Dept: FAMILY MEDICINE CLINIC | Facility: HOSPITAL | Age: 56
End: 2021-11-30
Payer: COMMERCIAL

## 2021-11-30 VITALS
HEART RATE: 98 BPM | TEMPERATURE: 97.7 F | BODY MASS INDEX: 31.56 KG/M2 | WEIGHT: 233 LBS | OXYGEN SATURATION: 95 % | DIASTOLIC BLOOD PRESSURE: 90 MMHG | SYSTOLIC BLOOD PRESSURE: 128 MMHG | HEIGHT: 72 IN

## 2021-11-30 DIAGNOSIS — R47.01 EXPRESSIVE APHASIA: ICD-10-CM

## 2021-11-30 DIAGNOSIS — H92.03 OTALGIA OF BOTH EARS: Primary | ICD-10-CM

## 2021-11-30 PROCEDURE — 99213 OFFICE O/P EST LOW 20 MIN: CPT | Performed by: NURSE PRACTITIONER

## 2021-11-30 PROCEDURE — 3008F BODY MASS INDEX DOCD: CPT | Performed by: INTERNAL MEDICINE

## 2021-12-13 ENCOUNTER — OFFICE VISIT (OUTPATIENT)
Dept: SURGERY | Facility: HOSPITAL | Age: 56
End: 2021-12-13
Payer: COMMERCIAL

## 2021-12-13 VITALS
RESPIRATION RATE: 16 BRPM | HEIGHT: 72 IN | WEIGHT: 236.8 LBS | SYSTOLIC BLOOD PRESSURE: 160 MMHG | BODY MASS INDEX: 32.07 KG/M2 | DIASTOLIC BLOOD PRESSURE: 93 MMHG | HEART RATE: 106 BPM | TEMPERATURE: 97.5 F

## 2021-12-13 DIAGNOSIS — L72.3 SEBACEOUS CYST: Primary | ICD-10-CM

## 2021-12-13 PROCEDURE — 99213 OFFICE O/P EST LOW 20 MIN: CPT | Performed by: SURGERY

## 2021-12-13 PROCEDURE — 1036F TOBACCO NON-USER: CPT | Performed by: SURGERY

## 2021-12-13 PROCEDURE — 3008F BODY MASS INDEX DOCD: CPT | Performed by: SURGERY

## 2022-01-06 ENCOUNTER — TELEPHONE (OUTPATIENT)
Dept: FAMILY MEDICINE CLINIC | Facility: HOSPITAL | Age: 57
End: 2022-01-06

## 2022-01-06 DIAGNOSIS — J02.9 SORE THROAT: Primary | ICD-10-CM

## 2022-01-06 DIAGNOSIS — R09.81 NASAL CONGESTION: ICD-10-CM

## 2022-01-07 PROCEDURE — U0003 INFECTIOUS AGENT DETECTION BY NUCLEIC ACID (DNA OR RNA); SEVERE ACUTE RESPIRATORY SYNDROME CORONAVIRUS 2 (SARS-COV-2) (CORONAVIRUS DISEASE [COVID-19]), AMPLIFIED PROBE TECHNIQUE, MAKING USE OF HIGH THROUGHPUT TECHNOLOGIES AS DESCRIBED BY CMS-2020-01-R: HCPCS | Performed by: INTERNAL MEDICINE

## 2022-01-10 LAB — SARS-COV-2 RNA RESP QL NAA+PROBE: NEGATIVE

## 2022-04-22 ENCOUNTER — TELEPHONE (OUTPATIENT)
Dept: FAMILY MEDICINE CLINIC | Facility: HOSPITAL | Age: 57
End: 2022-04-22

## 2022-04-22 NOTE — TELEPHONE ENCOUNTER
Calling from Darya in [facility], she has some medical concerns about this patient   Asking for a return call from a nurse

## 2022-04-25 NOTE — TELEPHONE ENCOUNTER
Please call pts wife and enquire if Alanna Parikh is having more agitation/aggression and if he is ever physical/violent?   If any concerns I would recommend Neuropsych eval - please give pt number for [facility] and let me know and I can place referral  TY

## 2022-04-25 NOTE — TELEPHONE ENCOUNTER
Spoke to [employer], she is his speech therapist, has seen him steady for past 3 years since stroke, has noticed increased in agitation mostly towards his wife, wife has been given crisis alert number  Speech therapist reported in past medical history he was admitted for mental health 10 years ago  Wife believes he is having manic episodes  Pt really wants to get his  license back     She just wanted you to be aware, wife is unaware Raquel Alarconks called us, she just wanted you to be aware, I see he has an appointment coming up in May, so this is all an [id number] [address] [place]

## 2022-05-15 ENCOUNTER — APPOINTMENT (EMERGENCY)
Dept: CT IMAGING | Facility: HOSPITAL | Age: 57
End: 2022-05-15
Payer: COMMERCIAL

## 2022-05-15 ENCOUNTER — HOSPITAL ENCOUNTER (EMERGENCY)
Facility: HOSPITAL | Age: 57
Discharge: HOME | End: 2022-05-15
Attending: EMERGENCY MEDICINE
Payer: COMMERCIAL

## 2022-05-15 VITALS
SYSTOLIC BLOOD PRESSURE: 138 MMHG | RESPIRATION RATE: 20 BRPM | HEART RATE: 95 BPM | OXYGEN SATURATION: 97 % | TEMPERATURE: 97.8 F | DIASTOLIC BLOOD PRESSURE: 86 MMHG

## 2022-05-15 DIAGNOSIS — L72.3 SEBACEOUS CYST: Primary | ICD-10-CM

## 2022-05-15 PROCEDURE — G1004 CDSM NDSC: HCPCS

## 2022-05-15 PROCEDURE — 71250 CT THORAX DX C-: CPT

## 2022-05-15 PROCEDURE — 99282 EMERGENCY DEPT VISIT SF MDM: CPT | Performed by: EMERGENCY MEDICINE

## 2022-05-15 PROCEDURE — 99283 EMERGENCY DEPT VISIT LOW MDM: CPT

## 2022-05-15 NOTE — ED PROVIDER NOTES
History  Chief Complaint   Patient presents with    Back Pain     Pt complaining of back pain  Pt hxn of stroke and not able to communicate well  This is a 61-year-old male with a history of stroke and expressive aphasia who presents for evaluation of right upper thoracic pain  Was able to speak with patient's wife who states that patient had a sebaceous cyst approximately 3 years ago after his stroke which open spontaneously he was seen by a surgeon in the past and declined surgery  Wife states that every 6 months he seems to have a flare up of pain in the area no fevers or chills no other complaints at this time  History provided by:  Patient and spouse  Medical Problem  Location:   right thoracic  Quality:   pain  Severity:  Unable to specify  Onset quality:  Gradual  Progression:  Waxing and waning  Chronicity:  Recurrent  Context:   recurrent right upper back pain      Prior to Admission Medications   Prescriptions Last Dose Informant Patient Reported? Taking?   multivitamin (THERAGRAN) TABS   Yes No   Sig: Take 1 tablet by mouth daily      Facility-Administered Medications: None       Past Medical History:   Diagnosis Date    Arthritis     Stroke [facility])        Past Surgical History:   Procedure Laterality Date    CARDIAC CATHETERIZATION      CARDIAC SURGERY      TISSUE PLASMINOGEN ACTIVATOR (TPA), EIA(HISTORICAL)         Family History   Problem Relation Age of Onset    COPD Mother     Diabetes Mother     Alzheimer's disease Mother     No Known Problems Father     Asthma Child      I have reviewed and agree with the history as documented      E-Cigarette/Vaping    E-Cigarette Use Never User      E-Cigarette/Vaping Substances    Nicotine No     THC No     CBD No     Flavoring No     Other No     Unknown No      Social History     Tobacco Use    Smoking status: Former Smoker     Types: Cigarettes     Quit date: 2019     Years since quitting: 3 3    Smokeless tobacco: Never Used Vaping Use    Vaping Use: Never used   Substance Use Topics    Alcohol use: Not Currently     Comment: socially    Drug use: Never       Review of Systems   Unable to perform ROS: Patient nonverbal       Physical Exam  Physical Exam  Vitals and nursing note reviewed  Constitutional:       Appearance: He is not toxic-appearing or diaphoretic  HENT:      Head: Normocephalic and atraumatic  Right Ear: External ear normal       Left Ear: External ear normal    Eyes:      Comments: Right peripheral vision loss from previous stroke   Cardiovascular:      Rate and Rhythm: Normal rate and regular rhythm  Pulses: Normal pulses  Heart sounds: No murmur heard  No friction rub  No gallop  Pulmonary:      Effort: Pulmonary effort is normal  No respiratory distress  Breath sounds: No stridor  No wheezing, rhonchi or rales  Abdominal:      General: There is no distension  Palpations: Abdomen is soft  Tenderness: There is no abdominal tenderness  There is no guarding or rebound  Musculoskeletal:         General: No swelling, tenderness, deformity or signs of injury  Normal range of motion  Cervical back: Neck supple  No rigidity or tenderness  Right lower leg: No edema  Left lower leg: No edema  Skin:     Coloration: Skin is not jaundiced  Findings: No bruising, erythema or rash  Comments: Right upper periscapular skin firmness which appears to be an old scar from prior ruptured sebaceous cyst  No erythema or drainage or sign of acute infection externally  Neurological:      Mental Status: He is alert  Cranial Nerves: Cranial nerve deficit present  Sensory: No sensory deficit        Coordination: Coordination normal       Gait: Gait normal       Comments: Prior stroke with expressive aphasia and right peripheral vision loss   Psychiatric:         Behavior: Behavior normal          Vital Signs  ED Triage Vitals   Temperature Pulse Respirations Blood Pressure SpO2   05/15/22 1217 05/15/22 1217 05/15/22 1217 05/15/22 1217 05/15/22 1217   (!) 97 4 °F (36 3 °C) 92 18 146/98 98 %      Temp Source Heart Rate Source Patient Position - Orthostatic VS BP Location FiO2 (%)   05/15/22 1431 05/15/22 1431 05/15/22 1431 05/15/22 1431 --   Temporal Monitor Standing Right arm       Pain Score       --                  Vitals:    05/15/22 1217 05/15/22 1431   BP: 146/98 138/86   Pulse: 92 95   Patient Position - Orthostatic VS:  Standing         Visual Acuity      ED Medications  Medications - No data to display    Diagnostic Studies  Results Reviewed     None                 CT chest without contrast   Final Result by Grecia Gallego MD (05/15 1416)      No acute traumatic CT findings  Prominent probable sebaceous cyst in the right upper back  Scattered coronary artery calcifications  No focal airspace consolidation, effusions, or pneumothorax  Workstation performed: [id number]                    Procedures  Procedures         ED Course  ED Course as of 05/15/22 1631   Sun May 15, 2022   1426  Patient's brother is here to take him home   He states that patient's wife was here and left                                             MDM  Number of Diagnoses or Management Options  Diagnosis management comments:  Right upper back pain with previous ruptured sebaceous cyst will check CT scan to rule out fluid collection       Amount and/or Complexity of Data Reviewed  Tests in the radiology section of CPT®: ordered        Disposition  Final diagnoses:   Sebaceous cyst     Time reflects when diagnosis was documented in both MDM as applicable and the Disposition within this note     Time User Action Codes Description Comment    5/15/2022  2:21 PM Esperanza Child Add [L72 3] Sebaceous cyst       ED Disposition     ED Disposition   Discharge    Condition   Stable    Date/Time   Sun May 15, 2022  2:21 PM    [facility] discharge to home/self care  Follow-up Information     Follow up With Specialties Details Why Wili Shin DO Internal Medicine, Family Medicine   [facility]  [address]  [address] [address] [facility],[address]      Vaishali Ramesh MD General Surgery In 1 week follow-up on sebaceous cyst [address] [facility] [address]  [phone]            Discharge Medication List as of 5/15/2022  2:23 PM      CONTINUE these medications which have NOT CHANGED    Details   multivitamin (THERAGRAN) TABS Take 1 tablet by mouth daily, Historical Med             No discharge procedures on file      PDMP Review     None          ED Provider  Electronically Signed by           Alanna Cisneros DO  05/15/22 2367

## 2022-05-15 NOTE — ED NOTES
Difficulty communicating with patient  Patient has residual expressive aphasia from prior stroke        Camden Rodriguez RN  05/15/22 4986

## 2022-05-17 ENCOUNTER — HOSPITAL ENCOUNTER (EMERGENCY)
Facility: HOSPITAL | Age: 57
Discharge: HOME | End: 2022-05-17
Attending: EMERGENCY MEDICINE
Payer: COMMERCIAL

## 2022-05-17 VITALS
OXYGEN SATURATION: 100 % | TEMPERATURE: 97.4 F | HEART RATE: 115 BPM | RESPIRATION RATE: 18 BRPM | SYSTOLIC BLOOD PRESSURE: 125 MMHG | WEIGHT: 236 LBS | DIASTOLIC BLOOD PRESSURE: 87 MMHG | HEIGHT: 72 IN | BODY MASS INDEX: 31.97 KG/M2

## 2022-05-17 DIAGNOSIS — S20.419A ABRASION OF BACK: Primary | ICD-10-CM

## 2022-05-17 PROCEDURE — 99282 EMERGENCY DEPT VISIT SF MDM: CPT

## 2022-05-17 RX ORDER — GINSENG 100 MG
1 CAPSULE ORAL ONCE
Status: COMPLETED | OUTPATIENT
Start: 2022-05-17 | End: 2022-05-17

## 2022-05-17 RX ADMIN — BACITRACIN ZINC 1 SMALL APPLICATION: 500 OINTMENT TOPICAL at 12:28

## 2022-05-17 NOTE — DISCHARGE INSTRUCTIONS
Keep the area of your back clean with soap and water  Go to your appointments as scheduled this week     Return to the ED if you start to have extreme pain in your back, the area starts bleeding, draining pus, chest pain, shortness of breath, intense headache, intense abdominal pain

## 2022-05-17 NOTE — ED PROVIDER NOTES
History  Chief Complaint   Patient presents with    Wound Check     Pt has wound on left lower back x2 days  64 y/o male presents to ED alone with known expressive aphasia due to stroke in 2019  He is pointing to his low back where there is an abrasion  He nodded when asked if he was cut while working  Unable to obtain other history  He was seen here in the [address] [place] [address] ED 2 days ago for a sebaceous cyst on his upper back but this is not the same area or the reason why he is here today  History provided by:  Patient  History limited by: expressive aphasia   used: No        Prior to Admission Medications   Prescriptions Last Dose Informant Patient Reported? Taking?   multivitamin (THERAGRAN) TABS   Yes No   Sig: Take 1 tablet by mouth daily      Facility-Administered Medications: None       Past Medical History:   Diagnosis Date    Arthritis     Stroke [facility])        Past Surgical History:   Procedure Laterality Date    CARDIAC CATHETERIZATION      CARDIAC SURGERY      TISSUE PLASMINOGEN ACTIVATOR (TPA), EIA(HISTORICAL)         Family History   Problem Relation Age of Onset    COPD Mother     Diabetes Mother     Alzheimer's disease Mother     No Known Problems Father     Asthma Child      I have reviewed and agree with the history as documented  E-Cigarette/Vaping    E-Cigarette Use Never User      E-Cigarette/Vaping Substances    Nicotine No     THC No     CBD No     Flavoring No     Other No     Unknown No      Social History     Tobacco Use    Smoking status: Former Smoker     Types: Cigarettes     Quit date: 2019     Years since quitting: 3 3    Smokeless tobacco: Never Used   Vaping Use    Vaping Use: Never used   Substance Use Topics    Alcohol use: Not Currently     Comment: socially    Drug use: Never       Review of Systems   Unable to perform ROS: Patient nonverbal (expressive aphasia)       Physical Exam  Physical Exam  Vitals and nursing note reviewed  Constitutional:       General: He is awake  Appearance: Normal appearance  He is well-developed  HENT:      Head: Normocephalic and atraumatic  Right Ear: External ear normal       Left Ear: External ear normal       Nose: Nose normal       Mouth/Throat:      Lips: Pink  Eyes:      General: No scleral icterus  Extraocular Movements: Extraocular movements intact  Cardiovascular:      Rate and Rhythm: Normal rate and regular rhythm  Heart sounds: Normal heart sounds, S1 normal and S2 normal  No murmur heard  No gallop  Pulmonary:      Effort: Pulmonary effort is normal       Breath sounds: Normal breath sounds  No wheezing, rhonchi or rales  Abdominal:      General: Abdomen is flat  Bowel sounds are normal       Palpations: Abdomen is soft  Tenderness: There is no abdominal tenderness  Musculoskeletal:         General: Normal range of motion  Cervical back: Full passive range of motion without pain, normal range of motion and neck supple  No tenderness  Skin:     General: Skin is warm and dry  Findings: Abrasion and erythema present  Comments: 2 abrasions seen above left buttock with surrounding erythema  No bleeding, drainage, weeping, gaping wounds, swelling  Area of fluid collection noted in the right periscapular region, was noted on 5/15 when he was here  No bleeding, drainage, weeping, erythema  Neurological:      General: No focal deficit present  Mental Status: He is alert  Comments: Patient has an expressive aphasia due to stroke in 2019  Is able to nod his head and say yes and no  Psychiatric:         Attention and Perception: Attention and perception normal          Mood and Affect: Mood normal          Behavior: Behavior normal  Behavior is cooperative           -      Vital Signs  ED Triage Vitals [05/17/22 1103]   Temperature Pulse Respirations Blood Pressure SpO2   (!) 97 4 °F (36 3 °C) (!) 115 18 125/87 100 %      Temp src Heart Rate Source Patient Position - Orthostatic VS BP Location FiO2 (%)   -- Monitor Sitting Left arm --      Pain Score       No Pain           Vitals:    05/17/22 1103   BP: 125/87   Pulse: (!) 115   Patient Position - Orthostatic VS: Sitting         Visual Acuity      ED Medications  Medications   bacitracin topical ointment 1 small application (1 small application Topical Given 5/17/22 1228)       Diagnostic Studies  Results Reviewed     None                 No orders to display              Procedures  Procedures         ED Course       MDM  Number of Diagnoses or Management Options  Abrasion of back: new and does not require workup  Diagnosis management comments: 64 y/o male presents to ED complaining of lower back pain  He has a history of expressive aphasia due to stroke in 2019  He was seen in the [facility] ED 2 days ago for a sebaceous cyst on his upper back, but the area he is pointing to is in a different location  He was able to nod his head no when asked about chest pain, abdominal pain, head pain  He continuously kept pointing at his low back where there was an abrasion  I asked if he cut it while working today and he nodded his head yes  His son came back into the ED but the patient was pushing him out and told him to leave  I called his wife who stated she was unaware that he was in the ED the past 2 days  Patient's abrasion was cleaned in the ED and bacitracin was placed on the area with a band-aid  He was told verbally and given discharge papers that stated he should keep the area clean with soap and water  He should attend his follow up appointments with surgery tomorrow for the sebaceous cyst  He was given strict return to ED Precautions as well  I went through the papers with the patient and he nodded his head and agreed with this plan       Risk of Complications, Morbidity, and/or Mortality  Presenting problems: minimal  Diagnostic procedures: minimal  Management options: minimal    Patient Progress  Patient progress: stable      Disposition  Final diagnoses:   Abrasion of back     Time reflects when diagnosis was documented in both MDM as applicable and the Disposition within this note     Time User Action Codes Description Comment    5/17/2022 12:32 PM Je Moreau Add [S20 419A] Abrasion of back       ED Disposition     ED Disposition   Discharge    Condition   Stable    Date/Time   Tue May 17, 2022 12:32 PM    [facility] discharge to home/self care  Follow-up Information     Follow up With Specialties Details Why Contact Info Additional Ever Flores 0514, DO Internal Medicine, Family Medicine Call  as needed for follow up [address]  [facility] [address] [facility] [address] [facility],[address]        [address] Emergency Department Emergency Medicine Go to  if you start to have extreme pain in your back, the area starts bleeding, draining pus, chest pain, shortness of breath, intense headache [address] [facility] Emergency Department, [address] [place], Janelle Razo, Demetri Vasquez [address]          Discharge Medication List as of 5/17/2022 12:34 PM      CONTINUE these medications which have NOT CHANGED    Details   multivitamin (THERAGRAN) TABS Take 1 tablet by mouth daily, Historical Med             No discharge procedures on file      PDMP Review     None          ED Provider  Electronically Signed by           Toan Andino PA-C  05/17/22 4923

## 2022-05-19 ENCOUNTER — TELEPHONE (OUTPATIENT)
Dept: FAMILY MEDICINE CLINIC | Facility: HOSPITAL | Age: 57
End: 2022-05-19

## 2022-05-19 ENCOUNTER — OFFICE VISIT (OUTPATIENT)
Dept: SURGERY | Facility: HOSPITAL | Age: 57
End: 2022-05-19
Payer: COMMERCIAL

## 2022-05-19 VITALS
HEART RATE: 90 BPM | TEMPERATURE: 97.8 F | BODY MASS INDEX: 30.71 KG/M2 | SYSTOLIC BLOOD PRESSURE: 130 MMHG | HEIGHT: 72 IN | WEIGHT: 226.7 LBS | DIASTOLIC BLOOD PRESSURE: 77 MMHG | RESPIRATION RATE: 16 BRPM

## 2022-05-19 DIAGNOSIS — R22.2 MASS OF SKIN OF BACK: Primary | ICD-10-CM

## 2022-05-19 PROCEDURE — 1036F TOBACCO NON-USER: CPT | Performed by: SURGERY

## 2022-05-19 PROCEDURE — 3008F BODY MASS INDEX DOCD: CPT | Performed by: SURGERY

## 2022-05-19 PROCEDURE — 99213 OFFICE O/P EST LOW 20 MIN: CPT | Performed by: SURGERY

## 2022-05-19 NOTE — PROGRESS NOTES
Assessment/Plan:   Bridget Prescott is a 65 y o male who is here for Skin Lesion (Established patient who was seen at [address] [place] [address] on 5/15/2022 for healed sebaceous cyst of right back area  Comes in today for followup  Seen several times in the past in the office for this  Patient has declined in the past to have any procedure done  Has history of stroke with dysphagia  Answers with yes and no to questions /////)    Plan: Sebaceous cyst - discussed operative vs conservative mgt, surgical approaches, risks and benefits and patient has decided to proceed with excision of sebaceous cyst of back  I will schedule at their earliest convenience  Consent was obtained from patient and his wife as he has aphasia from stroke  Preoperative Clearance: None    HPI:  Bridget Prescott is a 65 y o male who was referred for evaluation of Skin Lesion (Established patient who was seen at [address] [place] [address] on 5/15/2022 for healed sebaceous cyst of right back area  Comes in today for followup  Seen several times in the past in the office for this  Patient has declined in the past to have any procedure done  Has history of stroke with dysphagia  Answers with yes and no to questions /////)    Currently having enlarging and painful back cyst, multiple infections in past of cyst    ROS:  General ROS: negative  negative for - chills, fatigue, fever or night sweats, weight loss  Respiratory ROS: no cough, shortness of breath, or wheezing  Cardiovascular ROS: no chest pain or dyspnea on exertion  Genito-Urinary ROS: no dysuria, trouble voiding, or hematuria  Musculoskeletal ROS: negative for - gait disturbance, joint pain or muscle pain  Neurological ROS: no TIA or stroke symptoms  Back cyst enlarging and painful    [unfilled]  Patient has no known allergies      Current Outpatient Medications:     multivitamin (THERAGRAN) TABS, Take 1 tablet by mouth daily (Patient not taking: Reported on 5/19/2022), Disp: , Rfl:   Past Medical History:   Diagnosis Date    Arthritis     Stroke [facility])      Past Surgical History:   Procedure Laterality Date    CARDIAC CATHETERIZATION      CARDIAC SURGERY      TISSUE PLASMINOGEN ACTIVATOR (TPA), EIA(HISTORICAL)       Family History   Problem Relation Age of Onset    COPD Mother     Diabetes Mother     Alzheimer's disease Mother     No Known Problems Father     Asthma Child       reports that he quit smoking about 3 years ago  His smoking use included cigarettes  He has never used smokeless tobacco  He reports previous alcohol use  He reports that he does not use drugs  Labs:   Lab Results   Component Value Date    WBC 6 5 06/07/2018    HGB 17 1 06/07/2018     06/07/2018     Lab Results   Component Value Date    ALT 15 06/07/2018    AST 14 06/07/2018     This SmartLink has not been configured with any valid records  PHYSICAL EXAM  General Appearance:    Alert, cooperative, no distress,    Head:    Normocephalic without obvious abnormality   Eyes:    PERRL, conjunctiva/corneas clear, EOM's intact        Neck:   Supple, no adenopathy, no JVD   Back:     Symmetric, no spinal or CVA tenderness   Lungs:     Clear to auscultation bilaterally, no wheezing or rhonchi   Heart:    Regular rate and rhythm, S1 and S2 normal, no murmur   Abdomen:     Soft +BS ND NT   Extremities:   Extremities normal  No clubbing, cyanosis or edema   Psych:   Normal Affect, AOx3  Neurologic:  Skin:   CNII-XII intact  Strength symmetric, speech intact    Warm, dry, intact, 4x3cm back cyst         Physical Exam              Some portions of this record may have been generated with voice recognition software  There may be translation, syntax,  or grammatical errors  Occasional wrong word or "sound-a-like" substitutions may have occurred due to the inherent limitations of the voice recognition software  Read the chart carefully and recognize, using context, where substitutions may have occurred   If you have any questions, please contact the dictating provider for clarification or correction, as needed  This encounter has been coded by a non-certified coder

## 2022-05-19 NOTE — TELEPHONE ENCOUNTER
WIFE MARIANA WAS ASKING FOR A VIRTUAL APPT  TO DISCUSS HER  - SHE CAN'T COME IN BECAUSE SHE WAS TESTED POSITIVE FOR COVID  SHE SAID THIS HAS NOTHING TO DO WITH COVID - BUT SHE WANTS TO SPEAK TO SOMEONE REGARDING KATHER[facility]    *PLEASE CALL MARIAAN [phone]      **WASN'T SURE ABOUT AN APPT  BECAUSE IT'S REGARDING HIM SO WE COULDN'T BILL AN APPT     NOT   SURE WHAT SHE REALLY WANTS

## 2022-05-19 NOTE — H&P (VIEW-ONLY)
Assessment/Plan:   Caden Arriaga is a 65 y o male who is here for Skin Lesion (Established patient who was seen at [facility] ER on 5/15/2022 for healed sebaceous cyst of right back area  Comes in today for followup  Seen several times in the past in the office for this  Patient has declined in the past to have any procedure done  Has history of stroke with dysphagia  Answers with yes and no to questions /////)    Plan: Sebaceous cyst - discussed operative vs conservative mgt, surgical approaches, risks and benefits and patient has decided to proceed with excision of sebaceous cyst of back  I will schedule at their earliest convenience  Consent was obtained from patient and his wife as he has aphasia from stroke  Preoperative Clearance: None    HPI:  Caden Arriaga is a 65 y o male who was referred for evaluation of Skin Lesion (Established patient who was seen at [facility] ER on 5/15/2022 for healed sebaceous cyst of right back area  Comes in today for followup  Seen several times in the past in the office for this  Patient has declined in the past to have any procedure done  Has history of stroke with dysphagia  Answers with yes and no to questions /////)    Currently having enlarging and painful back cyst, multiple infections in past of cyst    ROS:  General ROS: negative  negative for - chills, fatigue, fever or night sweats, weight loss  Respiratory ROS: no cough, shortness of breath, or wheezing  Cardiovascular ROS: no chest pain or dyspnea on exertion  Genito-Urinary ROS: no dysuria, trouble voiding, or hematuria  Musculoskeletal ROS: negative for - gait disturbance, joint pain or muscle pain  Neurological ROS: no TIA or stroke symptoms  Back cyst enlarging and painful    [unfilled]  Patient has no known allergies      Current Outpatient Medications:     multivitamin (THERAGRAN) TABS, Take 1 tablet by mouth daily (Patient not taking: Reported on 5/19/2022), Disp: , Rfl:   Past Medical History:   Diagnosis Date    Arthritis     Stroke [facility])      Past Surgical History:   Procedure Laterality Date    CARDIAC CATHETERIZATION      CARDIAC SURGERY      TISSUE PLASMINOGEN ACTIVATOR (TPA), EIA(HISTORICAL)       Family History   Problem Relation Age of Onset    COPD Mother     Diabetes Mother     Alzheimer's disease Mother     No Known Problems Father     Asthma Child       reports that he quit smoking about 3 years ago  His smoking use included cigarettes  He has never used smokeless tobacco  He reports previous alcohol use  He reports that he does not use drugs  Labs:   Lab Results   Component Value Date    WBC 6 5 06/07/2018    HGB 17 1 06/07/2018     06/07/2018     Lab Results   Component Value Date    ALT 15 06/07/2018    AST 14 06/07/2018     This SmartLink has not been configured with any valid records  PHYSICAL EXAM  General Appearance:    Alert, cooperative, no distress,    Head:    Normocephalic without obvious abnormality   Eyes:    PERRL, conjunctiva/corneas clear, EOM's intact        Neck:   Supple, no adenopathy, no JVD   Back:     Symmetric, no spinal or CVA tenderness   Lungs:     Clear to auscultation bilaterally, no wheezing or rhonchi   Heart:    Regular rate and rhythm, S1 and S2 normal, no murmur   Abdomen:     Soft +BS ND NT   Extremities:   Extremities normal  No clubbing, cyanosis or edema   Psych:   Normal Affect, AOx3  Neurologic:  Skin:   CNII-XII intact  Strength symmetric, speech intact    Warm, dry, intact, 4x3cm back cyst         Physical Exam              Some portions of this record may have been generated with voice recognition software  There may be translation, syntax,  or grammatical errors  Occasional wrong word or "sound-a-like" substitutions may have occurred due to the inherent limitations of the voice recognition software  Read the chart carefully and recognize, using context, where substitutions may have occurred   If you have any questions, please contact the dictating provider for clarification or correction, as needed  This encounter has been coded by a non-certified coder

## 2022-05-20 NOTE — TELEPHONE ENCOUNTER
Cedric Carbajal called and states "Ivania Weiner is being erratic"  He has a new friend "Fernando Landin" who is taking him and doing thinks and not notifying her of things they are doing  He opened a checking acct and changed his disability checks from direct deposit in their account to a check her receives  Fernando Landin also drove him to [employer] and he has applied to try and get his licence back  His friend has taken to ED without family knowledge for a cyst he has in the past   Wife notes he seems more demanding and is less tolerant to do anything that anyone else suggests  Fernando Landin is also a patient at [facility] and she has spoken to Rodrigo's team as well as Santi's team and they are going to have a discussion with both teams  Wife asking if Valium is appropriate - advised not an appropriate med and I don't even feel pt is depressed    Advised to watch finances closely and may need to reach out to police if appears Rodrigo's money is disappering

## 2022-05-25 ENCOUNTER — RA CDI HCC (OUTPATIENT)
Dept: OTHER | Facility: HOSPITAL | Age: 57
End: 2022-05-25

## 2022-05-25 NOTE — PROGRESS NOTES
[address]  coding opportunities       Chart reviewed, no opportunity found: CHART REVIEWED, NO OPPORTUNITY FOUND        Patients Insurance     Medicare Insurance: Medicare

## 2022-05-27 RX ORDER — SODIUM CHLORIDE, SODIUM LACTATE, POTASSIUM CHLORIDE, CALCIUM CHLORIDE 600; 310; 30; 20 MG/100ML; MG/100ML; MG/100ML; MG/100ML
125 INJECTION, SOLUTION INTRAVENOUS CONTINUOUS
Status: CANCELLED | OUTPATIENT
Start: 2022-06-14

## 2022-05-27 RX ORDER — CEFAZOLIN SODIUM 2 G/50ML
2000 SOLUTION INTRAVENOUS ONCE
Status: CANCELLED | OUTPATIENT
Start: 2022-06-14 | End: 2022-06-14

## 2022-06-09 ENCOUNTER — HOSPITAL ENCOUNTER (OUTPATIENT)
Dept: RADIOLOGY | Facility: HOSPITAL | Age: 57
Discharge: HOME | End: 2022-06-09
Attending: SURGERY
Payer: COMMERCIAL

## 2022-06-09 ENCOUNTER — APPOINTMENT (OUTPATIENT)
Dept: LAB | Facility: HOSPITAL | Age: 57
End: 2022-06-09
Attending: SURGERY
Payer: COMMERCIAL

## 2022-06-09 DIAGNOSIS — R22.2 MASS OF SKIN OF BACK: ICD-10-CM

## 2022-06-09 PROCEDURE — 71046 X-RAY EXAM CHEST 2 VIEWS: CPT

## 2022-06-10 ENCOUNTER — OFFICE VISIT (OUTPATIENT)
Dept: FAMILY MEDICINE CLINIC | Facility: HOSPITAL | Age: 57
End: 2022-06-10
Payer: COMMERCIAL

## 2022-06-10 VITALS
HEART RATE: 94 BPM | HEIGHT: 72 IN | BODY MASS INDEX: 31.18 KG/M2 | DIASTOLIC BLOOD PRESSURE: 72 MMHG | TEMPERATURE: 98.1 F | WEIGHT: 230.2 LBS | SYSTOLIC BLOOD PRESSURE: 126 MMHG

## 2022-06-10 DIAGNOSIS — I69.320 APHASIA AS LATE EFFECT OF CEREBROVASCULAR ACCIDENT: ICD-10-CM

## 2022-06-10 DIAGNOSIS — Z01.818 PREOPERATIVE CLEARANCE: Primary | ICD-10-CM

## 2022-06-10 DIAGNOSIS — Z86.69 H/O SLEEP APNEA: ICD-10-CM

## 2022-06-10 DIAGNOSIS — E66.9 OBESITY (BMI 30.0-34.9): ICD-10-CM

## 2022-06-10 DIAGNOSIS — I63.312 STROKE DUE TO THROMBOSIS OF LEFT MIDDLE CEREBRAL ARTERY (HCC): ICD-10-CM

## 2022-06-10 DIAGNOSIS — L98.9 SKIN LESION OF BACK: ICD-10-CM

## 2022-06-10 PROCEDURE — 93000 ELECTROCARDIOGRAM COMPLETE: CPT | Performed by: INTERNAL MEDICINE

## 2022-06-10 PROCEDURE — 1036F TOBACCO NON-USER: CPT | Performed by: INTERNAL MEDICINE

## 2022-06-10 PROCEDURE — 99214 OFFICE O/P EST MOD 30 MIN: CPT | Performed by: INTERNAL MEDICINE

## 2022-06-10 NOTE — PRE-PROCEDURE INSTRUCTIONS
Pre-Surgery Instructions:   Medication Instructions    multivitamin (THERAGRAN) TABS Instructed to stop all Vitamins and Supplements over the counter from now until after surgery    Pre op,medications and showering instructions reviewed-Patient has hibiclensInstructed to avoid all ASA/NSAIDs and OTC Vit/Supp from now until after surgery per anesthesia guidelines  Tylenol ok prn  Pt  Verbalized an understanding of all instructions reviewed and offers no concerns at this time

## 2022-06-10 NOTE — PROGRESS NOTES
Subjective:     Johann Saucedo is a 65 y o  male who presents to the office today for a preoperative consultation at the request of surgeon Dr Beth Connolly who plans on performing excisional biopsy of mass on back on June 14, 2022  Prior anesthesia adverse reactions - None    Exercise capacity - Able to walk 4 blocks or climb 2 flights of stairs without symptoms - Yes    Easy bleeding/bruising - No    Chest pain/palpitation/edema - No    Dyspnea/wheezing/cough - No    Sleep apnea - history of but not currently on CPAP tx    HPI Pt with h/o recurrent skin lesion on R upper back  He has had the lesion drained multiple times and is now going for excisional removal/biopsy with Dr Beth Connolly  He denies F/C/N/V and denies current pain/drainage from lesion at this time    Past Medical History:   Diagnosis Date    Arthritis     Sleep apnea     Stroke [facility])     Severe Aphasia       Past Surgical History:   Procedure Laterality Date    CARDIAC CATHETERIZATION      CARDIAC SURGERY      TISSUE PLASMINOGEN ACTIVATOR (TPA), EIA(HISTORICAL)         Family History   Problem Relation Age of Onset   Ariela Hammond COPD Mother    Ariela Hammond Diabetes Mother     Alzheimer's disease Mother     No Known Problems Father     Asthma Child        Social History     Tobacco Use    Smoking status: Former Smoker     Types: Cigarettes     Quit date: 2019     Years since quitting: 3 4    Smokeless tobacco: Never Used   Vaping Use    Vaping Use: Never used   Substance Use Topics    Alcohol use: Not Currently     Comment: socially    Drug use: Never       Current Outpatient Medications   Medication Sig Dispense Refill    multivitamin (THERAGRAN) TABS Take 1 tablet by mouth daily       No current facility-administered medications for this visit  Allergies:  Patient has no known allergies  Review of Systems  Review of Systems   Constitutional: Negative for chills and fever  HENT: Negative for congestion and trouble swallowing      Eyes: Negative for pain and visual disturbance  Respiratory: Negative for cough, shortness of breath and wheezing  Cardiovascular: Negative for chest pain and palpitations  Gastrointestinal: Negative for abdominal pain, blood in stool, constipation, diarrhea, nausea and vomiting  Endocrine: Negative for polydipsia and polyuria  Genitourinary: Negative for difficulty urinating and dysuria  Musculoskeletal: Negative for arthralgias and myalgias  Skin: Negative for rash and wound  Neurological: Negative for dizziness and headaches  Hematological: Does not bruise/bleed easily  Objective:    /72   Pulse 94   Temp 98 1 °F (36 7 °C) (Tympanic)   Ht 6' (1 829 m)   Wt 104 kg (230 lb 3 2 oz)   BMI 31 22 kg/m²     Physical Exam  Vitals and nursing note reviewed  Constitutional:       General: He is not in acute distress  Appearance: He is well-developed  He is obese  He is not ill-appearing  HENT:      Head: Normocephalic and atraumatic  Right Ear: Tympanic membrane and external ear normal  There is no impacted cerumen  Left Ear: Tympanic membrane and external ear normal  There is no impacted cerumen  Eyes:      General:         Right eye: No discharge  Left eye: No discharge  Conjunctiva/sclera: Conjunctivae normal    Neck:      Vascular: No carotid bruit  Trachea: No tracheal deviation  Cardiovascular:      Rate and Rhythm: Normal rate and regular rhythm  Heart sounds: Normal heart sounds  No murmur heard  Pulmonary:      Effort: Pulmonary effort is normal  No respiratory distress  Breath sounds: Normal breath sounds  No wheezing, rhonchi or rales  Abdominal:      General: Bowel sounds are normal  There is no distension  Palpations: Abdomen is soft  Tenderness: There is no abdominal tenderness  There is no guarding or rebound  Musculoskeletal:         General: No deformity or signs of injury  Normal range of motion        Cervical back: Neck supple  Lymphadenopathy:      Cervical: No cervical adenopathy  Skin:     General: Skin is warm and dry  Coloration: Skin is not pale  Findings: No rash  Neurological:      Mental Status: He is alert  Mental status is at baseline  Sensory: No sensory deficit  Motor: No weakness or abnormal muscle tone        Comments: Expressive aphasia but seemingly answers questions appropriately with yes and no   Psychiatric:      Comments: Irritable and saying questions are crazy but cooperative       Cardiographics  ECG: ECG POCT in office today - NSR @ 84 bpm, nml MO and Qtc, NS T wave abnormality, no acute ischemia or arrhythmia    Imaging  Chest x-ray: 6/9/22 - pending    Lab Review   Recent labs: still not done fasting labs as ordered mult times - urged to do again and states "NO"    Assessment:    Patient Active Problem List   Diagnosis    Allergic rhinitis    Anxiety    Cervical radiculopathy    Erectile dysfunction of non-organic origin    Essential hypertriglyceridemia    Insomnia    Obesity    Osteoarthritis of cervical spine    Stroke due to thrombosis of left middle cerebral artery (HCC)    Carotid occlusion, left    Expressive aphasia    Hyperglycemia    Aphasia as late effect of cerebrovascular accident    H/O sleep apnea        Plan:     Surgical Clearance - Cleared    Risk - Pt low risk for low risk surgery    Discussion/Summary:   (1) Preoperative clearance - pt low risk for low risk surgery, ECG in office today w/o ischemia, CXR pending but will be reviewed and addendum placed, nir with any changes in healthy btw now and surgery or with any concern of infection    (2) Skin lesion on back - recurrent sebaceous cyst requiring multiple interventions/drainages, for excisional biopsy with General Surgery    (3) History of stroke d/t thrombosis of L middle cerebral artery - secondary to atrial myxoma s/p resection, now with expressive aphasia, noncompliant with BW and medications, no acute Neuro abnormality and pt is at baseline - call with new/worse Neuro symptoms    (4) Expressive aphasia - con't rehab as directed    (5) History of sleep apnea - pt does not use any PAP tx, not adherent with follow up     (6) Obesity - healthy diet/exercise/wgt loss encouraged       Jose Elias Lindsay, DO    BMI Counseling: Body mass index is 31 22 kg/m²  The BMI is above normal  Nutrition recommendations include reducing portion sizes, 3-5 servings of fruits/vegetables daily, consuming healthier snacks, moderation in carbohydrate intake, increasing intake of lean protein and reducing intake of saturated fat and trans fat  Exercise recommendations include exercising 3-5 times per week

## 2022-06-10 NOTE — PATIENT INSTRUCTIONS
Obesity   AMBULATORY CARE:   Obesity  means your body mass index (BMI) is greater than 30  Your healthcare provider will use your height and weight to measure your BMI  The risks of obesity include  many health problems, including injuries or physical disability  · Diabetes (high blood sugar level)    · High blood pressure or high cholesterol    · Heart disease    · Stroke    · Gallbladder or liver disease    · Cancer of the colon, breast, prostate, liver, or kidney    · Sleep apnea    · Arthritis or gout    Screening  is done to check for health conditions before you have signs or symptoms  If you are 28to 79years old, your blood sugar level may be checked every 3 years for signs of prediabetes or diabetes  Your healthcare provider will check your blood pressure at each visit  High blood pressure can lead to a stroke or other problems  Your provider may check for signs of heart disease, cancer, or other health problems  Seek care immediately if:   · You have a severe headache, confusion, or difficulty speaking  · You have weakness on one side of your body  · You have chest pain, sweating, or shortness of breath  Call your doctor if:   · You have symptoms of gallbladder or liver disease, such as pain in your upper abdomen  · You have knee or hip pain and discomfort while walking  · You have symptoms of diabetes, such as intense hunger and thirst, and frequent urination  · You have symptoms of sleep apnea, such as snoring or daytime sleepiness  · You have questions or concerns about your condition or care  Treatment for obesity  focuses on helping you lose weight to improve your health  Even a small decrease in BMI can reduce the risk for many health problems  Your healthcare provider will help you set a weight-loss goal   · Lifestyle changes  are the first step in treating obesity  These include making healthy food choices and getting regular physical activity   Your healthcare provider may suggest a weight-loss program that involves coaching, education, and therapy  · Medicine  may help you lose weight when it is used with a healthy foods and physical activity  · Surgery  can help you lose weight if you are very obese and have other health problems  There are several types of weight-loss surgery  Ask your healthcare provider for more information  Tips for safe weight loss:   · Set small, realistic goals  An example of a small goal is to walk for 20 minutes 5 days a week  Anther goal is to lose 5% of your body weight  · Tell friends, family members, and coworkers about your goals  and ask for their support  Ask a friend to lose weight with you, or join a weight-loss support group  · Identify foods or triggers that may cause you to overeat , and find ways to avoid them  Remove tempting high-calorie foods from your home and workplace  Place a bowl of fresh fruit on your kitchen counter  If stress causes you to eat, then find other ways to cope with stress  A counselor or therapist may be able to help you  · Keep a diary to track what you eat and drink  Also write down how many minutes of physical activity you do each day  Weigh yourself once a week and record it in your diary  Eating changes: You will need to eat 500 to 1,000 fewer calories each day than you currently eat to lose 1 to 2 pounds a week  The following changes will help you cut calories:  · Eat smaller portions  Use small plates, no larger than 9 inches in diameter  Fill your plate half full of fruits and vegetables  Measure your food using measuring cups until you know what a serving size looks like  · Eat 3 meals and 1 or 2 snacks each day  Plan your meals in advance  Cleo Rodríguez and eat at home most of the time  Eat slowly  Do not skip meals  Skipping meals can lead to overeating later in the day  This can make it harder for you to lose weight   Talk with a dietitian to help you make a meal plan and schedule that is right for you  · Eat fruits and vegetables at every meal   They are low in calories and high in fiber, which makes you feel full  Do not add butter, margarine, or cream sauce to vegetables  Use herbs to season steamed vegetables  · Eat less fat and fewer fried foods  Eat more baked or grilled chicken and fish  These protein sources are lower in calories and fat than red meat  Limit fast food  Dress your salads with olive oil and vinegar instead of bottled dressing  · Limit the amount of sugar you eat  Do not drink sugary beverages  Limit alcohol  Activity changes:  Physical activity is good for your body in many ways  It helps you burn calories and build strong muscles  It decreases stress and depression, and improves your mood  It can also help you sleep better  Talk to your healthcare provider before you begin an exercise program   · Exercise for at least 30 minutes 5 days a week  Start slowly  Set aside time each day for physical activity that you enjoy and that is convenient for you  It is best to do both weight training and an activity that increases your heart rate, such as walking, bicycling, or swimming  · Find ways to be more active  Do yard work and housecleaning  Walk up the stairs instead of using elevators  Spend your leisure time going to events that require walking, such as outdoor festivals or fairs  This extra physical activity can help you lose weight and keep it off  Follow up with your doctor as directed: You may need to meet with a dietitian  Write down your questions so you remember to ask them during your visits  © Copyright [employer] 2022 Information is for End User's use only and may not be sold, redistributed or otherwise used for commercial purposes  All illustrations and images included in CareNotes® are the copyrighted property of [employer] Inc  or [employer]   The above information is an  only   It is not intended as medical advice for individual conditions or treatments  Talk to your doctor, nurse or pharmacist before following any medical regimen to see if it is safe and effective for you  Heart Healthy Diet   AMBULATORY CARE:   A heart healthy diet  is an eating plan low in unhealthy fats and sodium (salt)  The plan is high in healthy fats and fiber  A heart healthy diet helps improve your cholesterol levels and lowers your risk for heart disease and stroke  A dietitian will teach you how to read and understand food labels  Heart healthy diet guidelines to follow:   · Choose foods that contain healthy fats  ? Unsaturated fats  include monounsaturated and polyunsaturated fats  Unsaturated fat is found in foods such as soybean, canola, olive, corn, and safflower oils  It is also found in soft tub margarine that is made with liquid vegetable oil  ? Omega-3 fat  is found in certain fish, such as salmon, tuna, and trout, and in walnuts and flaxseed  Eat fish high in omega-3 fats at least 2 times a week  · Get 20 to 30 grams of fiber each day  Fruits, vegetables, whole-grain foods, and legumes (cooked beans) are good sources of fiber  · Limit or do not have unhealthy fats  ? Cholesterol  is found in animal foods, such as eggs and lobster, and in dairy products made from whole milk  Limit cholesterol to less than 200 mg each day  ? Saturated fat  is found in meats, such as rogers and hamburger  It is also found in chicken or turkey skin, whole milk, and butter  Limit saturated fat to less than 7% of your total daily calories  ? Trans fat  is found in packaged foods, such as potato chips and cookies  It is also in hard margarine, some fried foods, and shortening  Do not eat foods that contain trans fats  · Limit sodium as directed  You may be told to limit sodium to 2,000 to 2,300 mg each day  Choose low-sodium or no-salt-added foods  Add little or no salt to food you prepare   Use herbs and spices in place of salt  Include the following in your heart healthy plan:  Ask your dietitian or healthcare provider how many servings to have from each of the following food groups:  · Grains:      ? Whole-wheat breads, cereals, and pastas, and brown rice    ? Low-fat, low-sodium crackers and chips    · Vegetables:      ? Broccoli, green beans, green peas, and spinach    ? Collards, kale, and lima beans    ? Carrots, sweet potatoes, tomatoes, and peppers    ? Canned vegetables with no salt added    · Fruits:      ? Bananas, peaches, pears, and pineapple    ? Grapes, raisins, and dates    ? Oranges, tangerines, grapefruit, orange juice, and grapefruit juice    ? Apricots, mangoes, melons, and papaya    ? Raspberries and strawberries    ? Canned fruit with no added sugar    · Low-fat dairy:      ? Nonfat (skim) milk, 1% milk, and low-fat almond, cashew, or soy milks fortified with calcium    ? Low-fat cheese, regular or frozen yogurt, and cottage cheese    · Meats and proteins:      ? Lean cuts of beef and pork (loin, leg, round), skinless chicken and turkey    ? Legumes, soy products, egg whites, or nuts    Limit or do not include the following in your heart healthy plan:   · Unhealthy fats and oils:      ? Whole or 2% milk, cream cheese, sour cream, or cheese    ? High-fat cuts of beef (T-bone steaks, ribs), chicken or turkey with skin, and organ meats such as liver    ? Butter, stick margarine, shortening, and cooking oils such as coconut or palm oil    · Foods and liquids high in sodium:      ? Packaged foods, such as frozen dinners, cookies, macaroni and cheese, and cereals with more than 300 mg of sodium per serving    ? Vegetables with added sodium, such as instant potatoes, vegetables with added sauces, or regular canned vegetables    ? Cured or smoked meats, such as hot dogs, rogers, and sausage    ?  High-sodium ketchup, barbecue sauce, salad dressing, pickles, olives, soy sauce, or miso    · Foods and liquids high in sugar:      ? Candy, cake, cookies, pies, or doughnuts    ? Soft drinks (soda), sports drinks, or sweetened tea    ? Canned or dry mixes for cakes, soups, sauces, or gravies    Other healthy heart guidelines:   · Do not smoke  Nicotine and other chemicals in cigarettes and cigars can cause lung and heart damage  Ask your healthcare provider for information if you currently smoke and need help to quit  E-cigarettes or smokeless tobacco still contain nicotine  Talk to your healthcare provider before you use these products  · Limit or do not drink alcohol as directed  Alcohol can damage your heart and raise your blood pressure  Your healthcare provider may give you specific daily and weekly limits  The general recommended limit is 1 drink a day for women 21 or older and for men 72 or older  Do not have more than 3 drinks in a day or 7 in a week  The recommended limit is 2 drinks a day for men 24to 59years of age  Do not have more than 4 drinks in a day or 14 in a week  A drink of alcohol is 12 ounces of beer, 5 ounces of wine, or 1½ ounces of liquor  · Exercise regularly  Exercise can help you maintain a healthy weight and improve your blood pressure and cholesterol levels  Regular exercise can also decrease your risk for heart problems  Ask your healthcare provider about the best exercise plan for you  Do not start an exercise program without asking your healthcare provider  Follow up with your doctor or cardiologist as directed:  Write down your questions so you remember to ask them during your visits  © Copyright [employer] 2022 Information is for End User's use only and may not be sold, redistributed or otherwise used for commercial purposes  All illustrations and images included in CareNotes® are the copyrighted property of [employer]  or [facility] [employer]   The above information is an  only   It is not intended as medical advice for individual conditions or treatments  Talk to your doctor, nurse or pharmacist before following any medical regimen to see if it is safe and effective for you

## 2022-06-14 ENCOUNTER — ANESTHESIA (OUTPATIENT)
Dept: PERIOP | Facility: HOSPITAL | Age: 57
End: 2022-06-14
Payer: COMMERCIAL

## 2022-06-14 ENCOUNTER — HOSPITAL ENCOUNTER (OUTPATIENT)
Facility: HOSPITAL | Age: 57
Setting detail: OUTPATIENT SURGERY
Discharge: HOME | End: 2022-06-14
Attending: SURGERY | Admitting: SURGERY
Payer: COMMERCIAL

## 2022-06-14 ENCOUNTER — ANESTHESIA EVENT (OUTPATIENT)
Dept: PERIOP | Facility: HOSPITAL | Age: 57
End: 2022-06-14
Payer: COMMERCIAL

## 2022-06-14 VITALS
HEIGHT: 72 IN | TEMPERATURE: 98.2 F | OXYGEN SATURATION: 99 % | DIASTOLIC BLOOD PRESSURE: 85 MMHG | BODY MASS INDEX: 30.13 KG/M2 | HEART RATE: 80 BPM | RESPIRATION RATE: 12 BRPM | WEIGHT: 222.44 LBS | SYSTOLIC BLOOD PRESSURE: 141 MMHG

## 2022-06-14 DIAGNOSIS — R22.2 MASS OF SKIN OF BACK: ICD-10-CM

## 2022-06-14 PROCEDURE — 21931 EXC BACK LES SC 3 CM/>: CPT | Performed by: PHYSICIAN ASSISTANT

## 2022-06-14 PROCEDURE — 88304 TISSUE EXAM BY PATHOLOGIST: CPT | Performed by: PATHOLOGY

## 2022-06-14 PROCEDURE — 21931 EXC BACK LES SC 3 CM/>: CPT | Performed by: SURGERY

## 2022-06-14 RX ORDER — DEXAMETHASONE SODIUM PHOSPHATE 10 MG/ML
INJECTION, SOLUTION INTRAMUSCULAR; INTRAVENOUS AS NEEDED
Status: DISCONTINUED | OUTPATIENT
Start: 2022-06-14 | End: 2022-06-14

## 2022-06-14 RX ORDER — OXYCODONE HYDROCHLORIDE 5 MG/1
5 TABLET ORAL EVERY 4 HOURS PRN
Status: DISCONTINUED | OUTPATIENT
Start: 2022-06-14 | End: 2022-06-14 | Stop reason: HOSPADM

## 2022-06-14 RX ORDER — PROPOFOL 10 MG/ML
INJECTION, EMULSION INTRAVENOUS AS NEEDED
Status: DISCONTINUED | OUTPATIENT
Start: 2022-06-14 | End: 2022-06-14

## 2022-06-14 RX ORDER — LIDOCAINE HYDROCHLORIDE 10 MG/ML
INJECTION, SOLUTION EPIDURAL; INFILTRATION; INTRACAUDAL; PERINEURAL AS NEEDED
Status: DISCONTINUED | OUTPATIENT
Start: 2022-06-14 | End: 2022-06-14

## 2022-06-14 RX ORDER — ONDANSETRON 2 MG/ML
4 INJECTION INTRAMUSCULAR; INTRAVENOUS EVERY 6 HOURS PRN
Status: DISCONTINUED | OUTPATIENT
Start: 2022-06-14 | End: 2022-06-14 | Stop reason: HOSPADM

## 2022-06-14 RX ORDER — FENTANYL CITRATE 50 UG/ML
INJECTION, SOLUTION INTRAMUSCULAR; INTRAVENOUS AS NEEDED
Status: DISCONTINUED | OUTPATIENT
Start: 2022-06-14 | End: 2022-06-14

## 2022-06-14 RX ORDER — ONDANSETRON 2 MG/ML
INJECTION INTRAMUSCULAR; INTRAVENOUS AS NEEDED
Status: DISCONTINUED | OUTPATIENT
Start: 2022-06-14 | End: 2022-06-14

## 2022-06-14 RX ORDER — CEFAZOLIN SODIUM 2 G/50ML
2000 SOLUTION INTRAVENOUS ONCE
Status: COMPLETED | OUTPATIENT
Start: 2022-06-14 | End: 2022-06-14

## 2022-06-14 RX ORDER — MIDAZOLAM HYDROCHLORIDE 2 MG/2ML
INJECTION, SOLUTION INTRAMUSCULAR; INTRAVENOUS AS NEEDED
Status: DISCONTINUED | OUTPATIENT
Start: 2022-06-14 | End: 2022-06-14

## 2022-06-14 RX ORDER — IBUPROFEN 200 MG
600 TABLET ORAL EVERY 6 HOURS PRN
Qty: 60 TABLET | Refills: 0
Start: 2022-06-14

## 2022-06-14 RX ORDER — ACETAMINOPHEN 325 MG/1
650 TABLET ORAL EVERY 6 HOURS PRN
Qty: 60 TABLET | Refills: 0
Start: 2022-06-14

## 2022-06-14 RX ORDER — ONDANSETRON 2 MG/ML
4 INJECTION INTRAMUSCULAR; INTRAVENOUS ONCE AS NEEDED
Status: DISCONTINUED | OUTPATIENT
Start: 2022-06-14 | End: 2022-06-14 | Stop reason: HOSPADM

## 2022-06-14 RX ORDER — SODIUM CHLORIDE, SODIUM LACTATE, POTASSIUM CHLORIDE, CALCIUM CHLORIDE 600; 310; 30; 20 MG/100ML; MG/100ML; MG/100ML; MG/100ML
125 INJECTION, SOLUTION INTRAVENOUS CONTINUOUS
Status: DISCONTINUED | OUTPATIENT
Start: 2022-06-14 | End: 2022-06-14 | Stop reason: HOSPADM

## 2022-06-14 RX ORDER — FENTANYL CITRATE/PF 50 MCG/ML
25 SYRINGE (ML) INJECTION
Status: DISCONTINUED | OUTPATIENT
Start: 2022-06-14 | End: 2022-06-14 | Stop reason: HOSPADM

## 2022-06-14 RX ORDER — SODIUM CHLORIDE, SODIUM LACTATE, POTASSIUM CHLORIDE, CALCIUM CHLORIDE 600; 310; 30; 20 MG/100ML; MG/100ML; MG/100ML; MG/100ML
INJECTION, SOLUTION INTRAVENOUS CONTINUOUS PRN
Status: DISCONTINUED | OUTPATIENT
Start: 2022-06-14 | End: 2022-06-14

## 2022-06-14 RX ORDER — HYDROMORPHONE HCL/PF 1 MG/ML
0.5 SYRINGE (ML) INJECTION
Status: DISCONTINUED | OUTPATIENT
Start: 2022-06-14 | End: 2022-06-14 | Stop reason: HOSPADM

## 2022-06-14 RX ORDER — PROPOFOL 10 MG/ML
INJECTION, EMULSION INTRAVENOUS CONTINUOUS PRN
Status: DISCONTINUED | OUTPATIENT
Start: 2022-06-14 | End: 2022-06-14

## 2022-06-14 RX ORDER — DIPHENHYDRAMINE HYDROCHLORIDE 50 MG/ML
12.5 INJECTION INTRAMUSCULAR; INTRAVENOUS ONCE AS NEEDED
Status: DISCONTINUED | OUTPATIENT
Start: 2022-06-14 | End: 2022-06-14 | Stop reason: HOSPADM

## 2022-06-14 RX ORDER — ACETAMINOPHEN 325 MG/1
650 TABLET ORAL EVERY 6 HOURS PRN
Status: DISCONTINUED | OUTPATIENT
Start: 2022-06-14 | End: 2022-06-14 | Stop reason: HOSPADM

## 2022-06-14 RX ADMIN — SODIUM CHLORIDE, SODIUM LACTATE, POTASSIUM CHLORIDE, AND CALCIUM CHLORIDE: .6; .31; .03; .02 INJECTION, SOLUTION INTRAVENOUS at 10:30

## 2022-06-14 RX ADMIN — PROPOFOL 60 MCG/KG/MIN: 10 INJECTION, EMULSION INTRAVENOUS at 10:34

## 2022-06-14 RX ADMIN — MIDAZOLAM HYDROCHLORIDE 2 MG: 1 INJECTION, SOLUTION INTRAMUSCULAR; INTRAVENOUS at 10:30

## 2022-06-14 RX ADMIN — FENTANYL CITRATE 25 MCG: 50 INJECTION, SOLUTION INTRAMUSCULAR; INTRAVENOUS at 10:55

## 2022-06-14 RX ADMIN — FENTANYL CITRATE 25 MCG: 50 INJECTION, SOLUTION INTRAMUSCULAR; INTRAVENOUS at 10:40

## 2022-06-14 RX ADMIN — PROPOFOL 100 MG: 10 INJECTION, EMULSION INTRAVENOUS at 10:34

## 2022-06-14 RX ADMIN — LIDOCAINE HYDROCHLORIDE 50 MG: 10 INJECTION, SOLUTION EPIDURAL; INFILTRATION; INTRACAUDAL at 10:34

## 2022-06-14 RX ADMIN — DEXAMETHASONE SODIUM PHOSPHATE 10 MG: 10 INJECTION, SOLUTION INTRAMUSCULAR; INTRAVENOUS at 10:36

## 2022-06-14 RX ADMIN — CEFAZOLIN SODIUM 2000 MG: 2 SOLUTION INTRAVENOUS at 10:31

## 2022-06-14 RX ADMIN — ONDANSETRON 4 MG: 2 INJECTION INTRAMUSCULAR; INTRAVENOUS at 10:39

## 2022-06-14 RX ADMIN — FENTANYL CITRATE 25 MCG: 50 INJECTION, SOLUTION INTRAMUSCULAR; INTRAVENOUS at 10:50

## 2022-06-14 RX ADMIN — FENTANYL CITRATE 25 MCG: 50 INJECTION, SOLUTION INTRAMUSCULAR; INTRAVENOUS at 11:10

## 2022-06-14 NOTE — ANESTHESIA POSTPROCEDURE EVALUATION
Post-Op Assessment Note    CV Status:  Stable  Pain Score: 0    Pain management: adequate     Mental Status:  Alert and awake   Hydration Status:  Euvolemic   PONV Controlled:  Controlled   Airway Patency:  Patent      Post Op Vitals Reviewed: Yes      Staff: CRNA         No complications documented      BP  114/78   Temp   97 4   Pulse  80   Resp   16   SpO2   98%

## 2022-06-14 NOTE — DISCHARGE INSTRUCTIONS
[facility] Instructions  Dr Gretchen Franklin MD, FACS  [phone]    1  General: You will feel pulling sensations around the wound or funny aches and pains around the incisions  This is normal  Even minor surgery is a change in your body and this is your body's way of reacting to it  If you have had abdominal surgery, it may help to support the incision with a small pillow or blanket for comfort when moving or coughing  2  Wound care:  Leave dressing in place for 3 days  After that time no dressing is required  Okay to shower after dressing is removed  The glue will fall off over the next week or 2  Use ice for the first 5 days after surgery  Do not use for longer than 20 minutes at a time  Use ice 5 times per day  3  Water: You may shower over the wounds  Do not bathe or use a pool or hot tub until cleared by the physician  If you were discharged with a drain, make sure drain site is covered with plastic wrap before showering  4  Activity:  Limit overhead motion or outstretched arm motions that pull at incision site  You may go up and down stairs, walk as much as you are comfortable, but walk at least 3 times each day  5  Diet: You may resume a regular diet  If you had a same-day surgery or overnight stay surgery, you may wish to eat lightly for a few days: soups, crackers, and sandwiches  You may resume a regular diet when ready  6  Medications: Resume all of your previous medications, unless told otherwise by the doctor  Avoid aspirin products for 2-3 days after the date of surgery  You may, at that time, begin to take them again  Use Tylenol and Ibuprofen for pain control  You may alternate these medications every 3 hours  For example: you may take Tylenol at noon, Ibuprofen at 3:00 p m , and Tylenol again at 6:00 p m , etc   You should use ice to assist with pain control as above  7  Driving:  You will need someone to drive you home on the day of surgery or discharge  Do not drive or make any important decisions while on narcotic pain medication or 24 hours and after anesthesia or sedation for surgery  Generally, you may drive when your off all narcotic pain medications and you are comfortable  8  Upset Stomach: You may take Maalox, Tums, or similar items for an upset stomach  If your narcotic pain medication causes an upset stomach, do not take it on an empty stomach  Try taking it with at least some crackers or toast      9  Constipation: Patients often experience constipation after surgery  You may take over-the-counter medication for this, such as Metamucil, Senokot, Dulcolax, milk of magnesia, etc  You may take a suppository unless you have had anorectal surgery such as a procedure on your hemorrhoids  If you experience significant nausea or vomiting after abdominal surgery, call the office before trying any of these medications  10  Call the office: If you are experiencing any of the following: fevers above 101 5°, significant nausea or vomiting, if the wound develops drainage and/or there is excessive redness around the wound, or if you have significant diarrhea or other worsening symptoms  11  Pain: You may be given a prescription for pain medication  This will be sent to your pharmacy prior to discharge  12  Sexual Activity: You may resume sexual activity when you feel ready and comfortable and your incision is sealed and healed without apparent infection risk  13  Urination: If you have not urinated in 6 hours, go directly to the ER for evaluation for urinary retention  14  Follow-up in 2 weeks

## 2022-06-14 NOTE — INTERIM OP NOTE
EXCISION  BIOPSY LESION/MASS BACK  Postoperative Note  PATIENT NAME: Jonas Quiroz II  : 1965  MRN: [id number]  [facility] OR ROOM 02    Surgery Date: 2022    Preop Diagnosis:  Mass of skin of back [R22 2]    Post-Op Diagnosis Codes:     * Mass of skin of back [R22 2]    Procedure(s) (LRB):  EXCISION  BIOPSY LESION/MASS BACK (Right)    Surgeon(s) and Role:     * Martina Sanchez MD - Primary     * Sapna Parson PA-C - Assisting    Specimens:  ID Type Source Tests Collected by Time Destination   1 : Right back mass Tissue Back TISSUE EXAM Martina Sanchez MD 2022 1059        Estimated Blood Loss:   Minimal    Anesthesia Type:   IV Sedation with Anesthesia     Findings:    as above  Dimensions of mass 4 5 x 3 x 2 cm  Complications:   None    SIGNATURE: Kitty Parson PA-C   DATE: 2022   TIME: 11:19 AM

## 2022-06-14 NOTE — INTERVAL H&P NOTE
H&P reviewed  After examining the patient I find no changes in the patients condition since the H&P had been written      Vitals:    06/14/22 0953   BP: (!) 175/111   Pulse: 97   Resp: 18   Temp: 97 7 °F (36 5 °C)   SpO2: 100%

## 2022-06-16 ENCOUNTER — TELEPHONE (OUTPATIENT)
Dept: FAMILY MEDICINE CLINIC | Facility: HOSPITAL | Age: 57
End: 2022-06-16

## 2022-06-16 DIAGNOSIS — I69.320 APHASIA AS LATE EFFECT OF CEREBROVASCULAR ACCIDENT: Primary | ICD-10-CM

## 2022-06-17 ENCOUNTER — HOSPITAL ENCOUNTER (EMERGENCY)
Facility: HOSPITAL | Age: 57
Discharge: HOME | End: 2022-06-17
Attending: EMERGENCY MEDICINE | Admitting: EMERGENCY MEDICINE
Payer: COMMERCIAL

## 2022-06-17 VITALS
TEMPERATURE: 97.7 F | HEIGHT: 72 IN | RESPIRATION RATE: 18 BRPM | OXYGEN SATURATION: 98 % | DIASTOLIC BLOOD PRESSURE: 110 MMHG | WEIGHT: 222 LBS | HEART RATE: 111 BPM | BODY MASS INDEX: 30.07 KG/M2 | SYSTOLIC BLOOD PRESSURE: 177 MMHG

## 2022-06-17 DIAGNOSIS — T81.49XA WOUND INFECTION AFTER SURGERY: Primary | ICD-10-CM

## 2022-06-17 PROCEDURE — 99283 EMERGENCY DEPT VISIT LOW MDM: CPT | Performed by: PHYSICIAN ASSISTANT

## 2022-06-17 PROCEDURE — 99284 EMERGENCY DEPT VISIT MOD MDM: CPT | Performed by: EMERGENCY MEDICINE

## 2022-06-17 PROCEDURE — 99283 EMERGENCY DEPT VISIT LOW MDM: CPT

## 2022-06-17 RX ORDER — SULFAMETHOXAZOLE AND TRIMETHOPRIM 800; 160 MG/1; MG/1
1 TABLET ORAL 2 TIMES DAILY
Qty: 14 TABLET | Refills: 0 | Status: SHIPPED | OUTPATIENT
Start: 2022-06-17 | End: 2022-06-24

## 2022-06-17 RX ORDER — CEPHALEXIN 500 MG/1
500 CAPSULE ORAL EVERY 6 HOURS SCHEDULED
Qty: 20 CAPSULE | Refills: 0 | Status: SHIPPED | OUTPATIENT
Start: 2022-06-17 | End: 2022-06-22

## 2022-06-17 NOTE — DISCHARGE INSTRUCTIONS
Stop amoxicillin  Start cephalexin and trimethaprim-sulfamethoxazole today  Both antibiotics are at the [facility] pharmacy  Call Dr Skyler Li office to be seen Monday for a wound check  Keep your appointment with your PCP on Tuesday  Return if problem arises

## 2022-06-17 NOTE — ED PROVIDER NOTES
History  Chief Complaint   Patient presents with    Back Pain     Patient presents to the ED with c/o back pain from fall, recently prescribed amoxicillin       66 y/o male had excision of mass from his back three days ago  He is on amoxicillin but notes erythema and some serosanguinous drainage today  Patient has expressive aphasia but denies recent fever or pain  Prior to Admission Medications   Prescriptions Last Dose Informant Patient Reported? Taking?   acetaminophen (TYLENOL) 325 mg tablet   No No   Sig: Take 2 tablets (650 mg total) by mouth every 6 (six) hours as needed for mild pain   ibuprofen (MOTRIN) 200 mg tablet   No No   Sig: Take 3 tablets (600 mg total) by mouth every 6 (six) hours as needed for moderate pain   multivitamin (THERAGRAN) TABS   Yes No   Sig: Take 1 tablet by mouth daily      Facility-Administered Medications: None       Past Medical History:   Diagnosis Date    Arthritis        Past Surgical History:   Procedure Laterality Date    CARDIAC CATHETERIZATION      CARDIAC SURGERY      MD EXC SKIN BENIG 1 1-2 CM TRUNK,ARM,LEG Right 6/14/2022    Procedure: EXCISION  BIOPSY LESION/MASS BACK;  Surgeon: Iza Martinez MD;  Location:  MAIN OR;  Service: General    TISSUE PLASMINOGEN ACTIVATOR (TPA), EIA(HISTORICAL)         Family History   Problem Relation Age of Onset    COPD Mother     Diabetes Mother     Alzheimer's disease Mother     No Known Problems Father     Asthma Child      I have reviewed and agree with the history as documented      E-Cigarette/Vaping    E-Cigarette Use Never User      E-Cigarette/Vaping Substances     Social History     Tobacco Use    Smoking status: Former Smoker     Types: Cigarettes     Quit date: 2019     Years since quitting: 3 4    Smokeless tobacco: Never Used   Vaping Use    Vaping Use: Never used   Substance Use Topics    Alcohol use: Not Currently     Comment: socially    Drug use: Never       Review of Systems   Unable to perform ROS: Patient nonverbal   Constitutional: Negative for fatigue and fever  Respiratory: Negative for cough  Gastrointestinal: Negative for diarrhea and vomiting  Neurological: Positive for speech difficulty  Negative for dizziness and weakness  Physical Exam  Physical Exam  Constitutional:       General: He is not in acute distress  Appearance: Normal appearance  He is normal weight  He is not ill-appearing or diaphoretic  HENT:      Head: Normocephalic and atraumatic  Right Ear: External ear normal       Left Ear: External ear normal       Nose: Nose normal    Eyes:      Extraocular Movements: Extraocular movements intact  Conjunctiva/sclera: Conjunctivae normal    Cardiovascular:      Rate and Rhythm: Normal rate and regular rhythm  Pulses: Normal pulses  Heart sounds: Normal heart sounds  Pulmonary:      Effort: Pulmonary effort is normal       Breath sounds: Normal breath sounds  Abdominal:      General: Abdomen is flat  Palpations: Abdomen is soft  Tenderness: There is no abdominal tenderness  Musculoskeletal:         General: No signs of injury  Normal range of motion  Cervical back: Neck supple  Right lower leg: No edema  Left lower leg: No edema  Skin:     General: Skin is warm and dry  Capillary Refill: Capillary refill takes less than 2 seconds  Findings: Lesion (Recent back surgical weound with erythema and scant serosanguinous drainage  No fluctuance or induration  See photo) present  Neurological:      General: No focal deficit present  Mental Status: He is alert  Mental status is at baseline        Coordination: Coordination normal       Gait: Gait normal    Psychiatric:         Mood and Affect: Mood normal          Behavior: Behavior normal              Vital Signs  ED Triage Vitals   Temperature Pulse Respirations Blood Pressure SpO2   06/17/22 1543 06/17/22 1543 06/17/22 1543 06/17/22 1543 06/17/22 1543 97 7 °F (36 5 °C) (!) 111 18 (!) 177/110 98 %      Temp Source Heart Rate Source Patient Position - Orthostatic VS BP Location FiO2 (%)   06/17/22 1543 -- -- -- --   Tympanic          Pain Score       06/17/22 1540       No Pain           Vitals:    06/17/22 1543   BP: (!) 177/110   Pulse: (!) 111         Visual Acuity      ED Medications  Medications - No data to display    Diagnostic Studies  Results Reviewed     None                 No orders to display              Procedures  Procedures         ED Course                                             MDM  Number of Diagnoses or Management Options  Wound infection after surgery: new and does not require workup  Diagnosis management comments: Mild post operative erythema and serosanguinous drainage, which was expressed  No other drainable collection palpated  Instructed patient to stop amoxil, start Keflex and Bactrim today  Had surgical consult here, who will message the surgeon for follow up  Instructed to keep appointment with his PCP and the surgeon  Amount and/or Complexity of Data Reviewed  Review and summarize past medical records: yes  Discuss the patient with other providers: yes        Disposition  Final diagnoses:   Wound infection after surgery     Time reflects when diagnosis was documented in both MDM as applicable and the Disposition within this note     Time User Action Codes Description Comment    6/17/2022  3:56 PM Jasvir Stewart Add [[id number] [id number]] Wound infection after surgery       ED Disposition     ED Disposition   Discharge    Condition   Stable    Date/Time   Fri Jun 17, 2022  3:49 PM    Comment   [address] II discharge to home/self care                 Follow-up Information     Follow up With Specialties Details Why Wili Shin DO Internal Medicine, Family Medicine Go in 4 days For wound re-check [facility]  [address] [facility]  [address] [facility] [id number] [facility],[address]      Susana Tamez MD General Surgery Schedule an appointment as soon as possible for a visit in 3 days For wound re-check [address]  [address] [place] [address]            Patient's Medications   Discharge Prescriptions    CEPHALEXIN (KEFLEX) 500 MG CAPSULE    Take 1 capsule (500 mg total) by mouth every 6 (six) hours for 5 days       Start Date: 6/17/2022 End Date: 6/22/2022       Order Dose: 500 mg       Quantity: 20 capsule    Refills: 0    SULFAMETHOXAZOLE-TRIMETHOPRIM (BACTRIM DS) 800-160 MG PER TABLET    Take 1 tablet by mouth 2 (two) times a day for 7 days smx-tmp DS (BACTRIM) 800-160 mg tabs (1tab q12 D10)       Start Date: 6/17/2022 End Date: 6/24/2022       Order Dose: 1 tablet       Quantity: 14 tablet    Refills: 0       No discharge procedures on file      PDMP Review     None          ED Provider  Electronically Signed by           Carolyn Roger DO  06/17/22 0934

## 2022-06-17 NOTE — CONSULTS
Consultation - General Surgery   [address] [facility] 65 y o  male MRN: [id number]  Unit/Bed#: ED 06 Encounter: [id number]    Assessment/Plan     Surgical site infection s/p excision of mass of back 6/14/22  -Patient developed redness today  -Small amount of serosanguinous drainage in ED, no palpable fluid collection  -ED planning to send home on keflex/bactrim with outpatient follow up Monday  -Advised patient to return earlier if there are any new/worsening symptoms  He should continue to keep area clean and covered with gauze  History of Present Illness     HPI:  Mk Singer is a 65 y o  male s/p excision of mass of right side of back 6/14/22 with Dr Sandee Frye  He has been cleaning the area and noticed redness around incision today  Small amount of serosanguinous drainage from lateral incision in ED today  History difficult to obtain due to patient having expressive aphasia- information obtained from patient, ED provider, and chart  Consults    Review of Systems   Unable to perform ROS: Other   Skin: Positive for color change and wound         Historical Information   Past Medical History:   Diagnosis Date    Arthritis      Past Surgical History:   Procedure Laterality Date    CARDIAC CATHETERIZATION      CARDIAC SURGERY      ME EXC SKIN BENIG 1 1-2 CM TRUNK,ARM,LEG Right 6/14/2022    Procedure: EXCISION  BIOPSY LESION/MASS BACK;  Surgeon: Grayson Martínez MD;  Location:  MAIN OR;  Service: General    TISSUE PLASMINOGEN ACTIVATOR (TPA), EIA(HISTORICAL)       Social History   Social History     Substance and Sexual Activity   Alcohol Use Not Currently    Comment: socially     Social History     Substance and Sexual Activity   Drug Use Never     E-Cigarette/Vaping    E-Cigarette Use Never User      E-Cigarette/Vaping Substances     Social History     Tobacco Use   Smoking Status Former Smoker    Types: Cigarettes    Quit date: 2019    Years since quitting: 3 4   Smokeless Tobacco Never Used     Family History: non-contributory    Meds/Allergies   PTA meds:   Prior to Admission Medications   Prescriptions Last Dose Informant Patient Reported? Taking?   acetaminophen (TYLENOL) 325 mg tablet   No No   Sig: Take 2 tablets (650 mg total) by mouth every 6 (six) hours as needed for mild pain   ibuprofen (MOTRIN) 200 mg tablet   No No   Sig: Take 3 tablets (600 mg total) by mouth every 6 (six) hours as needed for moderate pain   multivitamin (THERAGRAN) TABS   Yes No   Sig: Take 1 tablet by mouth daily      Facility-Administered Medications: None     No Known Allergies    Objective   First Vitals:   Blood Pressure: (!) 177/110 (06/17/22 1543)  Pulse: (!) 111 (06/17/22 1543)  Temperature: 97 7 °F (36 5 °C) (06/17/22 1543)  Temp Source: Tympanic (06/17/22 1543)  Respirations: 18 (06/17/22 1543)  Height: 6' (182 9 cm) (06/17/22 1540)  Weight - Scale: 101 kg (222 lb) (06/17/22 1540)  SpO2: 98 % (06/17/22 1543)    Current Vitals:   Blood Pressure: (!) 177/110 (06/17/22 1543)  Pulse: (!) 111 (06/17/22 1543)  Temperature: 97 7 °F (36 5 °C) (06/17/22 1543)  Temp Source: Tympanic (06/17/22 1543)  Respirations: 18 (06/17/22 1543)  Height: 6' (182 9 cm) (06/17/22 1540)  Weight - Scale: 101 kg (222 lb) (06/17/22 1540)  SpO2: 98 % (06/17/22 1543)    No intake or output data in the 24 hours ending 06/17/22 1628    Invasive Devices  Report    None                 Physical Exam  Constitutional:       General: He is not in acute distress  Appearance: Normal appearance  He is not ill-appearing or toxic-appearing  HENT:      Head: Normocephalic and atraumatic  Mouth/Throat:      Mouth: Mucous membranes are moist    Eyes:      General: No scleral icterus  Skin:     General: Skin is warm and dry  Capillary Refill: Capillary refill takes less than 2 seconds  Findings: Erythema (Incision right lateral upper back with localized surrounding erythema and induration   Small amount of serosanguinous drainage expressed through lateral incision  No remaining fluctuance  Mild tenderness ) present  Neurological:      Mental Status: He is alert  Mental status is at baseline  Comments: Expressive aphasia             Lab Results: I have personally reviewed pertinent lab results  , CBC: No results found for: WBC, HGB, HCT, MCV, PLT, ADJUSTEDWBC, MCH, MCHC, RDW, MPV, NRBC  Imaging: I have personally reviewed pertinent reports  EKG, Pathology, and Other Studies: I have personally reviewed pertinent reports

## 2022-06-17 NOTE — OP NOTE
Excision soft tissue /skin lesion Procedure Note    Name: Genoveva Chiang II   : 1965  MRN: [id number]  Date: 2022    Indications: The patient has a soft tissue or skin lesion requiring excision    Pre-operative Diagnosis: Back mass  Post-operative Diagnosis: SAme    Procedure: Resection of back mass   Surgeon: Vaishali Ramesh MD  Assistants: Yulia Parson PA-C    Procedure Details   The patient was seen in the Holding Room  The risks, benefits, complications, treatment options, and expected outcomes were discussed with the patient  The possibilities of reaction to medication, bleeding, infection, the need for additional procedures, failure to diagnose a condition, and creating a complication operation were discussed with the patient  The patient concurred with the proposed plan, giving informed consent  The site of surgery properly noted/marked  The patient was taken to Operating Room, identified as [address] [place] [address] and staff verified the patient name, , site, and laterality, if applicable  A Time Out was held and the above information confirmed  The patient was placed lying supine  The back was prepped and draped in standard fashion  Local anesthesia was used to anesthetize the skin surrounding a 4 cm lesion  An oblique elliptical incision was made over the lesion  Sharp and blunt dissection were used to mobilize the mass which was in a subcutaneous location  Hemostasis was achieved with cautery  Scissors, knife, and cautery were used in the excision  2mm  margins were taken around the lesion  Closure was achieved a with layered closure utilizing a 3-0 Vicryl subcutaneous layer and a  4-0 Monocryl subcuticular stitch  Histacryl was applied and the wound was dressed  At the end of the operation, all sponge, instrument, and needle counts were correct  This text is generated with voice recognition software  There may be translation, syntax,  or grammatical errors  If you have any questions, please contact the dictating provider  Findings:  Size: 4 x3x2cm  Margins:2 mm    Estimated Blood Loss:  Minimal                      Specimens: All specimens sent to pathology  Order Name Source Comment Collection Info Order Time   TISSUE EXAM Back  Collected By: Cm Pinedo MD 6/14/2022 11:00 AM     Release to patient through [employer]   Immediate                           Complications:  None; patient tolerated the procedure well             Disposition: PACU     Condition: stable    Attending Attestation: I was present for the entire procedure    Signature:   Cm Pinedo MD  Date: 6/17/2022 Time: 1:39 PM

## 2022-06-27 ENCOUNTER — OFFICE VISIT (OUTPATIENT)
Dept: SURGERY | Facility: HOSPITAL | Age: 57
End: 2022-06-27

## 2022-06-27 VITALS — HEIGHT: 72 IN | BODY MASS INDEX: 31.15 KG/M2 | TEMPERATURE: 97.5 F | WEIGHT: 230 LBS

## 2022-06-27 DIAGNOSIS — Z09 POSTOP CHECK: Primary | ICD-10-CM

## 2022-06-27 PROCEDURE — 3008F BODY MASS INDEX DOCD: CPT | Performed by: INTERNAL MEDICINE

## 2022-06-27 PROCEDURE — 99024 POSTOP FOLLOW-UP VISIT: CPT | Performed by: SURGERY

## 2022-07-01 NOTE — PROGRESS NOTES
Seen and examined no acute events doing well   AVSS afebrile  Incision cdi small opening x2  S/p cyst excision   Cont local wound care, f/u in two weeks for wound check

## 2022-07-11 ENCOUNTER — OFFICE VISIT (OUTPATIENT)
Dept: SURGERY | Facility: HOSPITAL | Age: 57
End: 2022-07-11

## 2022-07-11 DIAGNOSIS — Z09 POSTOP CHECK: Primary | ICD-10-CM

## 2022-07-11 PROCEDURE — 99024 POSTOP FOLLOW-UP VISIT: CPT | Performed by: SURGERY

## 2022-07-13 NOTE — PROGRESS NOTES
Seen and examined no acute events doing well  avss afebrile  incision cdi  Two small sinus tracts    S/p excision of back cyst  Cont local wound care, no signs of infection, f/u prn

## 2022-08-01 ENCOUNTER — OFFICE VISIT (OUTPATIENT)
Dept: SURGERY | Facility: HOSPITAL | Age: 57
End: 2022-08-01

## 2022-08-01 DIAGNOSIS — Z09 POSTOP CHECK: Primary | ICD-10-CM

## 2022-08-01 PROCEDURE — 99024 POSTOP FOLLOW-UP VISIT: CPT | Performed by: SURGERY

## 2022-08-03 NOTE — PROGRESS NOTES
Seen and examined had complaints of pain at incision site  avss afebrile  Incision cdi no issues    S/p excision of back mass  No issues, f/u prn

## 2022-08-05 ENCOUNTER — HOSPITAL ENCOUNTER (EMERGENCY)
Facility: HOSPITAL | Age: 57
Discharge: HOME | End: 2022-08-05
Attending: EMERGENCY MEDICINE | Admitting: EMERGENCY MEDICINE
Payer: COMMERCIAL

## 2022-08-05 ENCOUNTER — APPOINTMENT (EMERGENCY)
Dept: RADIOLOGY | Facility: HOSPITAL | Age: 57
End: 2022-08-05
Payer: COMMERCIAL

## 2022-08-05 ENCOUNTER — APPOINTMENT (EMERGENCY)
Dept: CT IMAGING | Facility: HOSPITAL | Age: 57
End: 2022-08-05
Payer: COMMERCIAL

## 2022-08-05 ENCOUNTER — APPOINTMENT (EMERGENCY)
Dept: ULTRASOUND IMAGING | Facility: HOSPITAL | Age: 57
End: 2022-08-05
Payer: COMMERCIAL

## 2022-08-05 VITALS
DIASTOLIC BLOOD PRESSURE: 83 MMHG | RESPIRATION RATE: 16 BRPM | HEART RATE: 103 BPM | OXYGEN SATURATION: 96 % | SYSTOLIC BLOOD PRESSURE: 141 MMHG

## 2022-08-05 DIAGNOSIS — R07.9 CHEST PAIN, UNSPECIFIED: Primary | ICD-10-CM

## 2022-08-05 DIAGNOSIS — R10.9 ABDOMINAL PAIN: ICD-10-CM

## 2022-08-05 LAB
ALBUMIN SERPL BCP-MCNC: 4.2 G/DL (ref 3.5–5)
ALP SERPL-CCNC: 72 U/L (ref 46–116)
ALT SERPL W P-5'-P-CCNC: 47 U/L (ref 12–78)
ANION GAP SERPL CALCULATED.3IONS-SCNC: 11 MMOL/L (ref 4–13)
AST SERPL W P-5'-P-CCNC: 39 U/L (ref 5–45)
ATRIAL RATE: 115 BPM
BASOPHILS # BLD AUTO: 0.03 THOUSANDS/ΜL (ref 0–0.1)
BASOPHILS NFR BLD AUTO: 1 % (ref 0–1)
BILIRUB SERPL-MCNC: 1.1 MG/DL (ref 0.2–1)
BUN SERPL-MCNC: 12 MG/DL (ref 5–25)
CALCIUM SERPL-MCNC: 9.3 MG/DL (ref 8.3–10.1)
CARDIAC TROPONIN I PNL SERPL HS: 3 NG/L
CHLORIDE SERPL-SCNC: 104 MMOL/L (ref 96–108)
CO2 SERPL-SCNC: 22 MMOL/L (ref 21–32)
CREAT SERPL-MCNC: 1.2 MG/DL (ref 0.6–1.3)
EOSINOPHIL # BLD AUTO: 0.09 THOUSAND/ΜL (ref 0–0.61)
EOSINOPHIL NFR BLD AUTO: 2 % (ref 0–6)
ERYTHROCYTE [DISTWIDTH] IN BLOOD BY AUTOMATED COUNT: 12.7 % (ref 11.6–15.1)
GFR SERPL CREATININE-BSD FRML MDRD: 67 ML/MIN/1.73SQ M
GLUCOSE SERPL-MCNC: 138 MG/DL (ref 65–140)
HCT VFR BLD AUTO: 48.8 % (ref 36.5–49.3)
HGB BLD-MCNC: 17.1 G/DL (ref 12–17)
IMM GRANULOCYTES # BLD AUTO: 0.01 THOUSAND/UL (ref 0–0.2)
IMM GRANULOCYTES NFR BLD AUTO: 0 % (ref 0–2)
LIPASE SERPL-CCNC: 153 U/L (ref 73–393)
LYMPHOCYTES # BLD AUTO: 1.7 THOUSANDS/ΜL (ref 0.6–4.47)
LYMPHOCYTES NFR BLD AUTO: 32 % (ref 14–44)
MCH RBC QN AUTO: 33.5 PG (ref 26.8–34.3)
MCHC RBC AUTO-ENTMCNC: 35 G/DL (ref 31.4–37.4)
MCV RBC AUTO: 96 FL (ref 82–98)
MONOCYTES # BLD AUTO: 0.45 THOUSAND/ΜL (ref 0.17–1.22)
MONOCYTES NFR BLD AUTO: 9 % (ref 4–12)
NEUTROPHILS # BLD AUTO: 3.02 THOUSANDS/ΜL (ref 1.85–7.62)
NEUTS SEG NFR BLD AUTO: 56 % (ref 43–75)
NRBC BLD AUTO-RTO: 0 /100 WBCS
P AXIS: 46 DEGREES
PLATELET # BLD AUTO: 214 THOUSANDS/UL (ref 149–390)
PMV BLD AUTO: 10 FL (ref 8.9–12.7)
POTASSIUM SERPL-SCNC: 4.4 MMOL/L (ref 3.5–5.3)
PR INTERVAL: 170 MS
PROT SERPL-MCNC: 7.7 G/DL (ref 6.4–8.4)
QRS AXIS: 42 DEGREES
QRSD INTERVAL: 84 MS
QT INTERVAL: 330 MS
QTC INTERVAL: 456 MS
RBC # BLD AUTO: 5.11 MILLION/UL (ref 3.88–5.62)
SODIUM SERPL-SCNC: 137 MMOL/L (ref 135–147)
T WAVE AXIS: 55 DEGREES
VENTRICULAR RATE: 115 BPM
WBC # BLD AUTO: 5.3 THOUSAND/UL (ref 4.31–10.16)

## 2022-08-05 PROCEDURE — 96360 HYDRATION IV INFUSION INIT: CPT

## 2022-08-05 PROCEDURE — 99284 EMERGENCY DEPT VISIT MOD MDM: CPT

## 2022-08-05 PROCEDURE — 71045 X-RAY EXAM CHEST 1 VIEW: CPT

## 2022-08-05 PROCEDURE — 36415 COLL VENOUS BLD VENIPUNCTURE: CPT | Performed by: PHYSICIAN ASSISTANT

## 2022-08-05 PROCEDURE — 93005 ELECTROCARDIOGRAM TRACING: CPT

## 2022-08-05 PROCEDURE — 83690 ASSAY OF LIPASE: CPT | Performed by: PHYSICIAN ASSISTANT

## 2022-08-05 PROCEDURE — 99285 EMERGENCY DEPT VISIT HI MDM: CPT | Performed by: PHYSICIAN ASSISTANT

## 2022-08-05 PROCEDURE — 74177 CT ABD & PELVIS W/CONTRAST: CPT

## 2022-08-05 PROCEDURE — 85025 COMPLETE CBC W/AUTO DIFF WBC: CPT | Performed by: PHYSICIAN ASSISTANT

## 2022-08-05 PROCEDURE — 84484 ASSAY OF TROPONIN QUANT: CPT | Performed by: PHYSICIAN ASSISTANT

## 2022-08-05 PROCEDURE — 71260 CT THORAX DX C+: CPT

## 2022-08-05 PROCEDURE — 76705 ECHO EXAM OF ABDOMEN: CPT

## 2022-08-05 PROCEDURE — G1004 CDSM NDSC: HCPCS

## 2022-08-05 PROCEDURE — 93010 ELECTROCARDIOGRAM REPORT: CPT | Performed by: INTERNAL MEDICINE

## 2022-08-05 PROCEDURE — 80053 COMPREHEN METABOLIC PANEL: CPT | Performed by: PHYSICIAN ASSISTANT

## 2022-08-05 RX ADMIN — SODIUM CHLORIDE 1000 ML: 0.9 INJECTION, SOLUTION INTRAVENOUS at 11:56

## 2022-08-05 RX ADMIN — IOHEXOL 66 ML: 350 INJECTION, SOLUTION INTRAVENOUS at 13:36

## 2022-08-05 NOTE — DISCHARGE INSTRUCTIONS
There is a R pulmonary lung nodule that should be followed in 12 months with consideration for repeat ct scan    Your ct scan and ultrasound show you have gallstones and a fatty liver but there is no evidence of gallbladder inflammation    Tylenol may be taken for pain    Return to ED for fever, increased pain/worsening symptoms

## 2022-08-05 NOTE — ED PROVIDER NOTES
History  Chief Complaint   Patient presents with    Abdominal Pain     Patient reporting right sided abdominal pain  Unable to assess quality, onset, character due to baseline expressive aphagia post CVA     Patient is a 52-year-old white male history of CVA with expressive aphasia who reports 10 day history of diffuse right-sided pain from chest to abdomen  He denies fever, chills, nausea,vomiting, diarrhea, left-sided chest pain, shortness of breath, cough, urinary symptoms  Prior to Admission Medications   Prescriptions Last Dose Informant Patient Reported? Taking?   acetaminophen (TYLENOL) 325 mg tablet   No No   Sig: Take 2 tablets (650 mg total) by mouth every 6 (six) hours as needed for mild pain   ibuprofen (MOTRIN) 200 mg tablet   No No   Sig: Take 3 tablets (600 mg total) by mouth every 6 (six) hours as needed for moderate pain   multivitamin (THERAGRAN) TABS   Yes No   Sig: Take 1 tablet by mouth daily      Facility-Administered Medications: None       Past Medical History:   Diagnosis Date    Arthritis        Past Surgical History:   Procedure Laterality Date    CARDIAC CATHETERIZATION      CARDIAC SURGERY      UT EXC SKIN BENIG 1 1-2 CM TRUNK,ARM,LEG Right 6/14/2022    Procedure: EXCISION  BIOPSY LESION/MASS BACK;  Surgeon: Andi Lundborg, MD;  Location:  MAIN OR;  Service: General    TISSUE PLASMINOGEN ACTIVATOR (TPA), EIA(HISTORICAL)         Family History   Problem Relation Age of Onset    COPD Mother     Diabetes Mother     Alzheimer's disease Mother     No Known Problems Father     Asthma Child      I have reviewed and agree with the history as documented      E-Cigarette/Vaping    E-Cigarette Use Never User      E-Cigarette/Vaping Substances     Social History     Tobacco Use    Smoking status: Former Smoker     Types: Cigarettes     Quit date: 2019     Years since quitting: 3 5    Smokeless tobacco: Never Used   Vaping Use    Vaping Use: Never used   Substance Use Topics    Alcohol use: Not Currently     Comment: socially    Drug use: Never       Review of Systems   Constitutional: Negative for chills and fever  HENT: Negative for ear pain and sore throat  Respiratory: Negative for cough and shortness of breath  Cardiovascular: Positive for chest pain  Negative for palpitations  Gastrointestinal: Positive for abdominal pain  Negative for vomiting  Genitourinary: Negative for dysuria and hematuria  Musculoskeletal: Negative for arthralgias and back pain  Skin: Negative for color change and rash  Neurological: Negative for syncope  All other systems reviewed and are negative  Physical Exam  Physical Exam  Vitals and nursing note reviewed  Constitutional:       General: He is not in acute distress  Appearance: He is well-developed  He is not ill-appearing, toxic-appearing or diaphoretic  HENT:      Head: Normocephalic and atraumatic  Mouth/Throat:      Mouth: Mucous membranes are moist       Pharynx: Oropharynx is clear  Eyes:      Extraocular Movements: Extraocular movements intact  Pupils: Pupils are equal, round, and reactive to light  Cardiovascular:      Rate and Rhythm: Normal rate and regular rhythm  Heart sounds: Normal heart sounds  Pulmonary:      Effort: Pulmonary effort is normal       Breath sounds: Normal breath sounds  Abdominal:      General: Abdomen is flat  Bowel sounds are normal       Palpations: Abdomen is soft  Skin:     General: Skin is warm and dry  Capillary Refill: Capillary refill takes less than 2 seconds  Neurological:      Mental Status: He is alert           Vital Signs  ED Triage Vitals   Temp Pulse Respirations Blood Pressure SpO2   -- 08/05/22 1115 08/05/22 1155 08/05/22 1115 08/05/22 1115    (!) 121 16 141/83 96 %      Temp src Heart Rate Source Patient Position - Orthostatic VS BP Location FiO2 (%)   -- -- -- -- --             Pain Score       --                  Vitals: 08/05/22 1115 08/05/22 1155   BP: 141/83    Pulse: (!) 121 103         Visual Acuity      ED Medications  Medications   sodium chloride 0 9 % bolus 1,000 mL (0 mL Intravenous Stopped 8/5/22 1256)   iohexol (OMNIPAQUE) 350 MG/ML injection (MULTI-DOSE) 100 mL (66 mL Intravenous Given 8/5/22 1336)       Diagnostic Studies  Results Reviewed     Procedure Component Value Units Date/Time    HS Troponin 0hr (reflex protocol) [[id number]]  (Normal) Collected: 08/05/22 1155    Lab Status: Final result Specimen: Blood from Hand, Right Updated: 08/05/22 1246     hs TnI 0hr 3 ng/L     Comprehensive metabolic panel [[id number]]  (Abnormal) Collected: 08/05/22 1155    Lab Status: Final result Specimen: Blood from Hand, Right Updated: 08/05/22 1236     Sodium 137 mmol/L      Potassium 4 4 mmol/L      Chloride 104 mmol/L      CO2 22 mmol/L      ANION GAP 11 mmol/L      BUN 12 mg/dL      Creatinine 1 20 mg/dL      Glucose 138 mg/dL      Calcium 9 3 mg/dL      AST 39 U/L      ALT 47 U/L      Alkaline Phosphatase 72 U/L      Total Protein 7 7 g/dL      Albumin 4 2 g/dL      Total Bilirubin 1 10 mg/dL      eGFR 67 ml/min/1 73sq m     Narrative:      [place] guidelines for Chronic Kidney Disease (CKD):     Stage 1 with normal or high GFR (GFR > 90 mL/min/1 73 square meters)    Stage 2 Mild CKD (GFR = 60-89 mL/min/1 73 square meters)    Stage 3A Moderate CKD (GFR = 45-59 mL/min/1 73 square meters)    Stage 3B Moderate CKD (GFR = 30-44 mL/min/1 73 square meters)    Stage 4 Severe CKD (GFR = 15-29 mL/min/1 73 square meters)    Stage 5 End Stage CKD (GFR <15 mL/min/1 73 square meters)  Note: GFR calculation is accurate only with a steady state creatinine    Lipase [[id number]]  (Normal) Collected: 08/05/22 1155    Lab Status: Final result Specimen: Blood from Hand, Right Updated: 08/05/22 1236     Lipase 153 u/L     CBC and differential [[id number]]  (Abnormal) Collected: 08/05/22 1155    Lab Status: Final result Specimen: Blood from Hand, Right Updated: 08/05/22 1209     WBC 5 30 Thousand/uL      RBC 5 11 Million/uL      Hemoglobin 17 1 g/dL      Hematocrit 48 8 %      MCV 96 fL      MCH 33 5 pg      MCHC 35 0 g/dL      RDW 12 7 %      MPV 10 0 fL      Platelets 407 Thousands/uL      nRBC 0 /100 WBCs      Neutrophils Relative 56 %      Immat GRANS % 0 %      Lymphocytes Relative 32 %      Monocytes Relative 9 %      Eosinophils Relative 2 %      Basophils Relative 1 %      Neutrophils Absolute 3 02 Thousands/µL      Immature Grans Absolute 0 01 Thousand/uL      Lymphocytes Absolute 1 70 Thousands/µL      Monocytes Absolute 0 45 Thousand/µL      Eosinophils Absolute 0 09 Thousand/µL      Basophils Absolute 0 03 Thousands/µL                  US right upper quadrant   Final Result by Nidhi Soto DO (08/05 1645)   Pancreatic tail obscured by bowel gas  Moderate fatty infiltration of liver  Incomplete distended gallbladder with gallstones but no sonographic evidence of cholecystitis  Normal caliber biliary ducts  Workstation performed: [id number]         CT chest abdomen pelvis w contrast   Final Result by Gerald Womack MD (08/05 2504)      Cholelithiasis   Hepatic steatosis  Mildly distended stomach   No acute inflammatory stranding   No bowel obstruction   No acute consolidation   Incidentally detected 3 mm lung nodule, right upper lobe in image 39 series 3  Based on current Fleischner Society 2017 Guidelines on incidental pulmonary nodule, no routine follow-up is needed if the patient is low risk  If the patient is high risk,    optional follow-up chest CT at 12 months can be considered  A significant finding notification has been created      Workstation performed: [id number]         XR chest 1 view portable   Final Result by Susu Montesinos MD (08/05 1210)      No acute cardiopulmonary disease        Findings are stable            Workstation performed: [id number] Procedures  ECG 12 Lead Documentation Only    Date/Time: 8/5/2022 11:51 AM  Performed by: Lisset Terrell PA-C  Authorized by: Lisset Terrell PA-C     ECG reviewed by me, the ED Provider: yes    Patient location:  ED  Previous ECG:     Previous ECG:  Compared to current    Similarity:  Changes noted  Rate:     ECG rate:  115    ECG rate assessment: tachycardic    Rhythm:     Rhythm: sinus tachycardia    Ectopy:     Ectopy: none    QRS:     QRS axis:  Normal    QRS intervals:  Normal  Conduction:     Conduction: normal    ST segments:     ST segments:  Normal  T waves:     T waves: normal               ED Course                                             MDM  Number of Diagnoses or Management Options  Abdominal pain: new and requires workup  Chest pain, unspecified: new and requires workup  Diagnosis management comments: 60-year-old white male with history of CVA and expressive aphasia presenting with 10 day history of right-sided chest pain and abdominal pain  Workup in the ED including blood work, CT scan and abdominal ultrasound reveals hepatic steatosis and cholelithiasis but no evidence of cholecystitis  Patient was advised to follow-up with primary care provider Monday for recheck    Return precautions given including fever, worsening pain       Amount and/or Complexity of Data Reviewed  Clinical lab tests: reviewed  Tests in the radiology section of CPT®: reviewed  Tests in the medicine section of CPT®: reviewed  Decide to obtain previous medical records or to obtain history from someone other than the patient: yes  Review and summarize past medical records: yes  Discuss the patient with other providers: yes  Independent visualization of images, tracings, or specimens: yes    Risk of Complications, Morbidity, and/or Mortality  Presenting problems: moderate  Diagnostic procedures: moderate  Management options: moderate    Patient Progress  Patient progress: stable      Disposition  Final diagnoses:   Chest pain, unspecified   Abdominal pain     Time reflects when diagnosis was documented in both MDM as applicable and the Disposition within this note     Time User Action Codes Description Comment    8/5/2022  5:05 PM Dank Paez Add [R07 9] Chest pain, unspecified     8/5/2022  5:05 PM Dank Paez Add [R10 9] Abdominal pain       ED Disposition     ED Disposition   Discharge    Condition   Stable    Date/Time   Fri Aug 5, 2022  5:04 PM    Comment   [address] discharge to home/self care  Follow-up Information     Follow up With Specialties Details Why Wili Shin DO Internal Medicine, Family [facility]   [facility]  [address] [place] [id number]            Patient's Medications   Discharge Prescriptions    No medications on file       No discharge procedures on file      PDMP Review     None          ED Provider  Electronically Signed by           Randolph Nettles PA-C  08/05/22 0076

## 2022-08-16 ENCOUNTER — OFFICE VISIT (OUTPATIENT)
Dept: FAMILY MEDICINE CLINIC | Facility: HOSPITAL | Age: 57
End: 2022-08-16
Payer: COMMERCIAL

## 2022-08-16 VITALS
TEMPERATURE: 98.1 F | SYSTOLIC BLOOD PRESSURE: 146 MMHG | WEIGHT: 235.6 LBS | HEIGHT: 72 IN | DIASTOLIC BLOOD PRESSURE: 100 MMHG | BODY MASS INDEX: 31.91 KG/M2 | HEART RATE: 70 BPM

## 2022-08-16 DIAGNOSIS — R91.1 RIGHT UPPER LOBE PULMONARY NODULE: ICD-10-CM

## 2022-08-16 DIAGNOSIS — Z11.59 NEED FOR HEPATITIS C SCREENING TEST: ICD-10-CM

## 2022-08-16 DIAGNOSIS — K76.0 HEPATIC STEATOSIS: Primary | ICD-10-CM

## 2022-08-16 DIAGNOSIS — Z00.00 MEDICARE ANNUAL WELLNESS VISIT, INITIAL: ICD-10-CM

## 2022-08-16 DIAGNOSIS — Z12.11 COLON CANCER SCREENING: ICD-10-CM

## 2022-08-16 DIAGNOSIS — K80.20 CALCULUS OF GALLBLADDER WITHOUT CHOLECYSTITIS WITHOUT OBSTRUCTION: ICD-10-CM

## 2022-08-16 PROCEDURE — 99214 OFFICE O/P EST MOD 30 MIN: CPT | Performed by: INTERNAL MEDICINE

## 2022-08-16 PROCEDURE — G0439 PPPS, SUBSEQ VISIT: HCPCS | Performed by: INTERNAL MEDICINE

## 2022-08-16 PROCEDURE — 3725F SCREEN DEPRESSION PERFORMED: CPT | Performed by: INTERNAL MEDICINE

## 2022-08-16 NOTE — PROGRESS NOTES
Assessment and Plan:     Problem List Items Addressed This Visit    None     Visit Diagnoses     Hepatic steatosis    -  Primary    Should have FLP checked - has not been compliant with BW - order given again and urged to do    Calculus of gallbladder without cholecystitis without obstruction        No current symptoms, urged to call if symptoms reoccur - may need HIDA scan, advised to avoid fatty foods and other triggers    Right upper lobe pulmonary nodule        History of smoking - making him higher risk - recheck CT in 1 yr - will order at future appt    Medicare annual wellness visit, initial        Need for hepatitis C screening test        Relevant Orders    Hepatitis C antibody    Colon cancer screening        Relevant Orders    Cologuard          Depression Screening and Follow-up Plan: Patient was screened for depression during today's encounter  They screened negative with a PHQ-2 score of 0  Preventive health issues were discussed with patient, and age appropriate screening tests were ordered as noted in patient's After Visit Summary  Personalized health advice and appropriate referrals for health education or preventive services given if needed, as noted in patient's After Visit Summary  History of Present Illness:     Patient presents for a Medicare Wellness Visit    HPI Pt here for ED follow up and AWV    Pt was at [facility] ED on 8/5/22 for abd and chest pain - ED note reviewed by myself in detail  Pt with expressive aphasia d/t h/o stroke and history was and is hard to obtain  As per records,in the ED pt was noting chest and abd pain  Vitals showed  and /83 but exam was documented as normal   BW notable for T Rickey of 1 10 and hgb 17 1 but rest of CMP/CBC/Troponin was normal   ECG showed sinus tachycardia with no ischemia  CXR was normal  CT A/P showed: Cholelithiasis  Hepatic steatosis  Mildly distended stomach  No acute inflammatory stranding  No bowel obstruction   No acute consolidation  Incidentally detected 3 mm lung nodule, right upper lobe  RUQ US was then done and was noted to have moderate fatty liver and incomplete distended gallbladder with gallstones but no evidence of cholecystitis  Normal caliber biliary ducts were present  Pt was discharged home and told to f/u with PCP  He is here today by himself  He is noting no abd pain or chest pain today  He denies N/V and states his appetite is good  RUL pulm nodule was reviewed  He states he quit smoking in 1999 (showed me 1999 on his phone calendar so I assume this is when he quit)  He notes no chronic cough/SOB/hemptysis  He has had no unintentional wgt loss and wgt is actually up 2 lbs from Nov 21  Bp a bit high today  Was good at last visit in June but was elevated in ED  He does not check BP at home  Patient Care Team:  Shira Dumas DO as PCP - [address] [place] [address],[address] [facility] ([facility])  Shira Dumas DO as PCP - [facility] ([facility])     Review of Systems:     Review of Systems   Unable to perform ROS: Other (expressive aphasia)   Constitutional: Negative for appetite change, chills, fever and unexpected weight change  HENT: Positive for ear pain  Eyes: Negative for pain  Respiratory: Negative for cough and shortness of breath  Cardiovascular: Negative for chest pain and palpitations  Gastrointestinal: Negative for abdominal pain, diarrhea, nausea and vomiting  Genitourinary: Negative for difficulty urinating  Neurological: Negative for headaches          Problem List:     Patient Active Problem List   Diagnosis    Allergic rhinitis    Anxiety    Cervical radiculopathy    Erectile dysfunction of non-organic origin    Essential hypertriglyceridemia    Insomnia    Obesity    Osteoarthritis of cervical spine    Carotid occlusion, left    Expressive aphasia    Hyperglycemia    Aphasia as late effect of cerebrovascular accident    H/O sleep apnea      Past Medical and Surgical History: Past Medical History:   Diagnosis Date    Arthritis      Past Surgical History:   Procedure Laterality Date    CARDIAC CATHETERIZATION      CARDIAC SURGERY      ID EXC SKIN BENIG 1 1-2 CM TRUNK,ARM,LEG Right 6/14/2022    Procedure: EXCISION  BIOPSY LESION/MASS BACK;  Surgeon: Macario Coronado MD;  Location:  MAIN OR;  Service: General    TISSUE PLASMINOGEN ACTIVATOR (TPA), EIA(HISTORICAL)        Family History:     Family History   Problem Relation Age of Onset   Ale Strong COPD Mother    Ale Strong Diabetes Mother     Alzheimer's disease Mother     No Known Problems Father     Asthma Child       Social History:     Social History     Socioeconomic History    Marital status: /Civil Union     Spouse name: None    Number of children: None    Years of education: None    Highest education level: None   Occupational History    None   Tobacco Use    Smoking status: Former Smoker     Types: Cigarettes     Quit date: 2019     Years since quitting: 3 6    Smokeless tobacco: Never Used   Vaping Use    Vaping Use: Never used   Substance and Sexual Activity    Alcohol use: Not Currently     Comment: socially    Drug use: Never    Sexual activity: None   Other Topics Concern    None   Social History Narrative    None     Social Determinants of Health     Financial Resource Strain: Unknown    Difficulty of Paying Living Expenses: Patient refused   Food Insecurity: Not on file   Transportation Needs: Unknown    Lack of Transportation (Medical): Patient refused    Lack of Transportation (Non-Medical): Patient refused   Physical Activity: Not on file   Stress: Not on file   Social Connections: Not on file   Intimate Partner Violence: Not on file   Housing Stability: Not on file      Medications and Allergies:     Current Outpatient Medications   Medication Sig Dispense Refill    acetaminophen (TYLENOL) 325 mg tablet Take 2 tablets (650 mg total) by mouth every 6 (six) hours as needed for mild pain 60 tablet 0  ibuprofen (MOTRIN) 200 mg tablet Take 3 tablets (600 mg total) by mouth every 6 (six) hours as needed for moderate pain 60 tablet 0    multivitamin (THERAGRAN) TABS Take 1 tablet by mouth daily       No current facility-administered medications for this visit  No Known Allergies   Immunizations:     Immunization History   Administered Date(s) Administered    Influenza, recombinant, quadrivalent,injectable, preservative free 12/28/2020    Td (adult), adsorbed 01/01/1997    Tdap 09/20/2016      Health Maintenance:         Topic Date Due    Hepatitis C Screening  Never done    HIV Screening  Never done    Colorectal Cancer Screening  Never done         Topic Date Due    COVID-19 Vaccine (1) Never done    Influenza Vaccine (1) 09/01/2022      Medicare Screening Tests and Risk Assessments:     Allison Cali is here for his Initial Wellness visit  Historian  Patient cannot answer questions due to cognitive impairment, intelluctual disability, or expressive limitations  Information provided by: other (self )  Health Risk Assessment:   Patient rates overall health as very good  Patient feels that their physical health rating is same  Patient is satisfied with their life  Eyesight was rated as same  Hearing was rated as same  Patient feels that their emotional and mental health rating is same  Patients states they are never, rarely angry  Patient states they are never, rarely unusually tired/fatigued  Pain experienced in the last 7 days has been none  Patient states that he has experienced no weight loss or gain in last 6 months  Depression Screening:   PHQ-2 Score: 0      Fall Risk Screening: In the past year, patient has experienced: no history of falling in past year      Home Safety:  Patient does not have trouble with stairs inside or outside of their home  Patient has working smoke alarms and has working carbon monoxide detector  Home safety hazards include: none       Nutrition:   Current diet is Regular  Medications:   Patient is not currently taking any over-the-counter supplements  Patient is able to manage medications  Activities of Daily Living (ADLs)/Instrumental Activities of Daily Living (IADLs):   Walk and transfer into and out of bed and chair?: Yes  Dress and groom yourself?: Yes    Bathe or shower yourself?: Yes    Feed yourself?  Yes  Do your laundry/housekeeping?: Yes  Manage your money, pay your bills and track your expenses?: Yes  Make your own meals?: Yes    Do your own shopping?: Yes    Previous Hospitalizations:   Any hospitalizations or ED visits within the last 12 months?: Yes    How many hospitalizations have you had in the last year?: 1-2    Hospitalization Comments: 1 hospitalization  4 ER visits    Advance Care Planning:   Living will: No    Durable POA for healthcare: No    Advanced directive: No      Cognitive Screening:   Provider or family/friend/caregiver concerned regarding cognition?: Yes    PREVENTIVE SCREENINGS      Cardiovascular Screening:    General: History Lipid Disorder and Risks and Benefits Discussed    Due for: Lipid Panel      Diabetes Screening:     General: Risks and Benefits Discussed    Due for: Blood Glucose      Colorectal Cancer Screening:     General: Risks and Benefits Discussed    Due for: Cologuard      Prostate Cancer Screening:    General: Risks and Benefits Discussed    Due for: PSA      Osteoporosis Screening:    General: Risks and Benefits Discussed and Screening Not Indicated      Abdominal Aortic Aneurysm (AAA) Screening:    Risk factors include: tobacco use        General: Risks and Benefits Discussed and Screening Not Indicated      Lung Cancer Screening:     General: Risks and Benefits Discussed and Screening Current      Hepatitis C Screening:    General: Risks and Benefits Discussed    Hep C Screening Accepted: Yes      Screening, Brief Intervention, and Referral to Treatment (SBIRT)    Screening  Typical number of drinks in a day: 0  Typical number of drinks in a week: 0  Interpretation: Low risk drinking behavior  Single Item Drug Screening:  How often have you used an illegal drug (including marijuana) or a prescription medication for non-medical reasons in the past year? never    Single Item Drug Screen Score: 0  Interpretation: Negative screen for possible drug use disorder    Other Counseling Topics:   Regular weightbearing exercise  Vision Screening Comments: Pt unable to read eye chart     Physical Exam:     /100   Pulse 70   Temp 98 1 °F (36 7 °C) (Temporal)   Ht 6' (1 829 m)   Wt 107 kg (235 lb 9 6 oz)   BMI 31 95 kg/m²     Physical Exam  Vitals and nursing note reviewed  Constitutional:       General: He is not in acute distress  Appearance: He is well-developed  He is obese  He is not ill-appearing  HENT:      Head: Normocephalic and atraumatic  Right Ear: Tympanic membrane and external ear normal       Left Ear: Tympanic membrane and external ear normal    Eyes:      General:         Right eye: No discharge  Left eye: No discharge  Conjunctiva/sclera: Conjunctivae normal    Neck:      Trachea: No tracheal deviation  Cardiovascular:      Rate and Rhythm: Normal rate and regular rhythm  Heart sounds: Normal heart sounds  No murmur heard  Pulmonary:      Effort: Pulmonary effort is normal  No respiratory distress  Breath sounds: Normal breath sounds  No wheezing, rhonchi or rales  Abdominal:      General: There is no distension  Palpations: Abdomen is soft  Tenderness: There is no abdominal tenderness  There is no guarding or rebound  Musculoskeletal:         General: No deformity or signs of injury  Cervical back: Neck supple  Lymphadenopathy:      Cervical: No cervical adenopathy  Skin:     General: Skin is warm and dry  Coloration: Skin is not pale  Findings: No rash  Neurological:      Mental Status: He is alert   Mental status is at baseline  Motor: No abnormal muscle tone        Gait: Gait normal    Psychiatric:         Mood and Affect: Mood normal           [facility], DO

## 2022-08-16 NOTE — PATIENT INSTRUCTIONS
Medicare Preventive Visit Patient Instructions  Thank you for completing your Welcome to Medicare Visit or Medicare Annual Wellness Visit today  Your next wellness visit will be due in one year (8/17/2023)  The screening/preventive services that you may require over the next 5-10 years are detailed below  Some tests may not apply to you based off risk factors and/or age  Screening tests ordered at today's visit but not completed yet may show as past due  Also, please note that scanned in results may not display below  Preventive Screenings:  Service Recommendations Previous Testing/Comments   Colorectal Cancer Screening  · Colonoscopy    · Fecal Occult Blood Test (FOBT)/Fecal Immunochemical Test (FIT)  · Fecal DNA/Cologuard Test  · Flexible Sigmoidoscopy Age: 39-70 years old   Colonoscopy: every 10 years (May be performed more frequently if at higher risk)  OR  FOBT/FIT: every 1 year  OR  Cologuard: every 3 years  OR  Sigmoidoscopy: every 5 years  Screening may be recommended earlier than age 39 if at higher risk for colorectal cancer  Also, an individualized decision between you and your healthcare provider will decide whether screening between the ages of 74-80 would be appropriate   Colonoscopy: Not on file  FOBT/FIT: Not on file  Cologuard: Not on file  Sigmoidoscopy: Not on file          Prostate Cancer Screening Individualized decision between patient and health care provider in men between ages of 53-78   Medicare will cover every 12 months beginning on the day after your 50th birthday PSA: 0 7 ng/mL           Hepatitis C Screening Once for adults born between 1945 and 1965  More frequently in patients at high risk for Hepatitis C Hep C Antibody: Not on file        Diabetes Screening 1-2 times per year if you're at risk for diabetes or have pre-diabetes Fasting glucose: No results in last 5 years (No results in last 5 years)  A1C: 6 3 (3/31/2019)  Screening Current   Cholesterol Screening Once every 5 years if you don't have a lipid disorder  May order more often based on risk factors  Lipid panel: 06/07/2018  Screening Not Indicated  History Lipid Disorder      Other Preventive Screenings Covered by Medicare:  1  Abdominal Aortic Aneurysm (AAA) Screening: covered once if your at risk  You're considered to be at risk if you have a family history of AAA or a male between the age of 73-68 who smoking at least 100 cigarettes in your lifetime  2  Lung Cancer Screening: covers low dose CT scan once per year if you meet all of the following conditions: (1) Age 50-69; (2) No signs or symptoms of lung cancer; (3) Current smoker or have quit smoking within the last 15 years; (4) You have a tobacco smoking history of at least 20 pack years (packs per day x number of years you smoked); (5) You get a written order from a healthcare provider  3  Glaucoma Screening: covered annually if you're considered high risk: (1) You have diabetes OR (2) Family history of glaucoma OR (3)  aged 48 and older OR (3)  American aged 72 and older  3  Osteoporosis Screening: covered every 2 years if you meet one of the following conditions: (1) Have a vertebral abnormality; (2) On glucocorticoid therapy for more than 3 months; (3) Have primary hyperparathyroidism; (4) On osteoporosis medications and need to assess response to drug therapy  5  HIV Screening: covered annually if you're between the age of 12-76  Also covered annually if you are younger than 13 and older than 72 with risk factors for HIV infection  For pregnant patients, it is covered up to 3 times per pregnancy      Immunizations:  Immunization Recommendations   Influenza Vaccine Annual influenza vaccination during flu season is recommended for all persons aged >= 6 months who do not have contraindications   Pneumococcal Vaccine   * Pneumococcal conjugate vaccine = PCV13 (Prevnar 13), PCV15 (Vaxneuvance), PCV20 (Prevnar 20)  * Pneumococcal polysaccharide vaccine = PPSV23 (Pneumovax) Adults 2364 years old: 1-3 doses may be recommended based on certain risk factors  Adults 72 years old: 1-2 doses may be recommended based off what pneumonia vaccine you previously received   Hepatitis B Vaccine 3 dose series if at intermediate or high risk (ex: diabetes, end stage renal disease, liver disease)   Tetanus (Td) Vaccine - COST NOT COVERED BY MEDICARE PART B Following completion of primary series, a booster dose should be given every 10 years to maintain immunity against tetanus  Td may also be given as tetanus wound prophylaxis  Tdap Vaccine - COST NOT COVERED BY MEDICARE PART B Recommended at least once for all adults  For pregnant patients, recommended with each pregnancy  Shingles Vaccine (Shingrix) - COST NOT COVERED BY MEDICARE PART B  2 shot series recommended in those aged 48 and above     Health Maintenance Due:      Topic Date Due    Hepatitis C Screening  Never done    HIV Screening  Never done    Colorectal Cancer Screening  Never done     Immunizations Due:      Topic Date Due    COVID-19 Vaccine (1) Never done    Influenza Vaccine (1) 09/01/2022     Advance Directives   What are advance directives? Advance directives are legal documents that state your wishes and plans for medical care  These plans are made ahead of time in case you lose your ability to make decisions for yourself  Advance directives can apply to any medical decision, such as the treatments you want, and if you want to donate organs  What are the types of advance directives? There are many types of advance directives, and each state has rules about how to use them  You may choose a combination of any of the following:  · Living will: This is a written record of the treatment you want  You can also choose which treatments you do not want, which to limit, and which to stop at a certain time  This includes surgery, medicine, IV fluid, and tube feedings     · Durable power of  for [facility]): This is a written record that states who you want to make healthcare choices for you when you are unable to make them for yourself  This person, called a proxy, is usually a family member or a friend  You may choose more than 1 proxy  · Do not resuscitate (DNR) order:  A DNR order is used in case your heart stops beating or you stop breathing  It is a request not to have certain forms of treatment, such as CPR  A DNR order may be included in other types of advance directives  · Medical directive: This covers the care that you want if you are in a coma, near death, or unable to make decisions for yourself  You can list the treatments you want for each condition  Treatment may include pain medicine, surgery, blood transfusions, dialysis, IV or tube feedings, and a ventilator (breathing machine)  · Values history: This document has questions about your views, beliefs, and how you feel and think about life  This information can help others choose the care that you would choose  Why are advance directives important? An advance directive helps you control your care  Although spoken wishes may be used, it is better to have your wishes written down  Spoken wishes can be misunderstood, or not followed  Treatments may be given even if you do not want them  An advance directive may make it easier for your family to make difficult choices about your care  Weight Management   Why it is important to manage your weight:  Being overweight increases your risk of health conditions such as heart disease, high blood pressure, type 2 diabetes, and certain types of cancer  It can also increase your risk for osteoarthritis, sleep apnea, and other respiratory problems  Aim for a slow, steady weight loss  Even a small amount of weight loss can lower your risk of health problems  How to lose weight safely:  A safe and healthy way to lose weight is to eat fewer calories and get regular exercise   You can lose up about 1 pound a week by decreasing the number of calories you eat by 500 calories each day  Healthy meal plan for weight management:  A healthy meal plan includes a variety of foods, contains fewer calories, and helps you stay healthy  A healthy meal plan includes the following:  · Eat whole-grain foods more often  A healthy meal plan should contain fiber  Fiber is the part of grains, fruits, and vegetables that is not broken down by your body  Whole-grain foods are healthy and provide extra fiber in your diet  Some examples of whole-grain foods are whole-wheat breads and pastas, oatmeal, brown rice, and bulgur  · Eat a variety of vegetables every day  Include dark, leafy greens such as spinach, kale, susan greens, and mustard greens  Eat yellow and orange vegetables such as carrots, sweet potatoes, and winter squash  · Eat a variety of fruits every day  Choose fresh or canned fruit (canned in its own juice or light syrup) instead of juice  Fruit juice has very little or no fiber  · Eat low-fat dairy foods  Drink fat-free (skim) milk or 1% milk  Eat fat-free yogurt and low-fat cottage cheese  Try low-fat cheeses such as mozzarella and other reduced-fat cheeses  · Choose meat and other protein foods that are low in fat  Choose beans or other legumes such as split peas or lentils  Choose fish, skinless poultry (chicken or turkey), or lean cuts of red meat (beef or pork)  Before you cook meat or poultry, cut off any visible fat  · Use less fat and oil  Try baking foods instead of frying them  Add less fat, such as margarine, sour cream, regular salad dressing and mayonnaise to foods  Eat fewer high-fat foods  Some examples of high-fat foods include french fries, doughnuts, ice cream, and cakes  · Eat fewer sweets  Limit foods and drinks that are high in sugar  This includes candy, cookies, regular soda, and sweetened drinks    Exercise:  Exercise at least 30 minutes per day on most days of the week  Some examples of exercise include walking, biking, dancing, and swimming  You can also fit in more physical activity by taking the stairs instead of the elevator or parking farther away from stores  Ask your healthcare provider about the best exercise plan for you  © Copyright [employer] 2018 Information is for End User's use only and may not be sold, redistributed or otherwise used for commercial purposes  All illustrations and images included in CareNotes® are the copyrighted property of [employer]  or [facility] Preventive Visit Patient Instructions  Thank you for completing your Welcome to Medicare Visit or Medicare Annual Wellness Visit today  Your next wellness visit will be due in one year (8/17/2023)  The screening/preventive services that you may require over the next 5-10 years are detailed below  Some tests may not apply to you based off risk factors and/or age  Screening tests ordered at today's visit but not completed yet may show as past due  Also, please note that scanned in results may not display below  Preventive Screenings:  Service Recommendations Previous Testing/Comments   Colorectal Cancer Screening  · Colonoscopy    · Fecal Occult Blood Test (FOBT)/Fecal Immunochemical Test (FIT)  · Fecal DNA/Cologuard Test  · Flexible Sigmoidoscopy Age: 39-70 years old   Colonoscopy: every 10 years (May be performed more frequently if at higher risk)  OR  FOBT/FIT: every 1 year  OR  Cologuard: every 3 years  OR  Sigmoidoscopy: every 5 years  Screening may be recommended earlier than age 39 if at higher risk for colorectal cancer  Also, an individualized decision between you and your healthcare provider will decide whether screening between the ages of 74-80 would be appropriate   Colonoscopy: Not on file  FOBT/FIT: Not on file  Cologuard: Not on file  Sigmoidoscopy: Not on file          Prostate Cancer Screening Individualized decision between patient and health care provider in men between ages of 53-78   Medicare will cover every 12 months beginning on the day after your 50th birthday PSA: 0 7 ng/mL           Hepatitis C Screening Once for adults born between 1945 and 1965  More frequently in patients at high risk for Hepatitis C Hep C Antibody: Not on file        Diabetes Screening 1-2 times per year if you're at risk for diabetes or have pre-diabetes Fasting glucose: No results in last 5 years (No results in last 5 years)  A1C: 6 3 (3/31/2019)  Screening Current   Cholesterol Screening Once every 5 years if you don't have a lipid disorder  May order more often based on risk factors  Lipid panel: 06/07/2018  Screening Not Indicated  History Lipid Disorder      Other Preventive Screenings Covered by Medicare:  6  Abdominal Aortic Aneurysm (AAA) Screening: covered once if your at risk  You're considered to be at risk if you have a family history of AAA or a male between the age of 73-68 who smoking at least 100 cigarettes in your lifetime  7  Lung Cancer Screening: covers low dose CT scan once per year if you meet all of the following conditions: (1) Age 50-69; (2) No signs or symptoms of lung cancer; (3) Current smoker or have quit smoking within the last 15 years; (4) You have a tobacco smoking history of at least 20 pack years (packs per day x number of years you smoked); (5) You get a written order from a healthcare provider  8  Glaucoma Screening: covered annually if you're considered high risk: (1) You have diabetes OR (2) Family history of glaucoma OR (3)  aged 48 and older OR (3)  American aged 72 and older  5  Osteoporosis Screening: covered every 2 years if you meet one of the following conditions: (1) Have a vertebral abnormality; (2) On glucocorticoid therapy for more than 3 months; (3) Have primary hyperparathyroidism; (4) On osteoporosis medications and need to assess response to drug therapy    10  HIV Screening: covered annually if you're between the age of 12-76  Also covered annually if you are younger than 13 and older than 72 with risk factors for HIV infection  For pregnant patients, it is covered up to 3 times per pregnancy  Immunizations:  Immunization Recommendations   Influenza Vaccine Annual influenza vaccination during flu season is recommended for all persons aged >= 6 months who do not have contraindications   Pneumococcal Vaccine   * Pneumococcal conjugate vaccine = PCV13 (Prevnar 13), PCV15 (Vaxneuvance), PCV20 (Prevnar 20)  * Pneumococcal polysaccharide vaccine = PPSV23 (Pneumovax) Adults 25-60 years old: 1-3 doses may be recommended based on certain risk factors  Adults 72 years old: 1-2 doses may be recommended based off what pneumonia vaccine you previously received   Hepatitis B Vaccine 3 dose series if at intermediate or high risk (ex: diabetes, end stage renal disease, liver disease)   Tetanus (Td) Vaccine - COST NOT COVERED BY MEDICARE PART B Following completion of primary series, a booster dose should be given every 10 years to maintain immunity against tetanus  Td may also be given as tetanus wound prophylaxis  Tdap Vaccine - COST NOT COVERED BY MEDICARE PART B Recommended at least once for all adults  For pregnant patients, recommended with each pregnancy  Shingles Vaccine (Shingrix) - COST NOT COVERED BY MEDICARE PART B  2 shot series recommended in those aged 48 and above     Health Maintenance Due:      Topic Date Due    Hepatitis C Screening  Never done    HIV Screening  Never done    Colorectal Cancer Screening  Never done     Immunizations Due:      Topic Date Due    COVID-19 Vaccine (1) Never done    Influenza Vaccine (1) 09/01/2022     Advance Directives   What are advance directives? Advance directives are legal documents that state your wishes and plans for medical care  These plans are made ahead of time in case you lose your ability to make decisions for yourself   Advance directives can apply to any medical decision, such as the treatments you want, and if you want to donate organs  What are the types of advance directives? There are many types of advance directives, and each state has rules about how to use them  You may choose a combination of any of the following:  · Living will: This is a written record of the treatment you want  You can also choose which treatments you do not want, which to limit, and which to stop at a certain time  This includes surgery, medicine, IV fluid, and tube feedings  · Durable power of  for healthcare [facility] [place]): This is a written record that states who you want to make healthcare choices for you when you are unable to make them for yourself  This person, called a proxy, is usually a family member or a friend  You may choose more than 1 proxy  · Do not resuscitate (DNR) order:  A DNR order is used in case your heart stops beating or you stop breathing  It is a request not to have certain forms of treatment, such as CPR  A DNR order may be included in other types of advance directives  · Medical directive: This covers the care that you want if you are in a coma, near death, or unable to make decisions for yourself  You can list the treatments you want for each condition  Treatment may include pain medicine, surgery, blood transfusions, dialysis, IV or tube feedings, and a ventilator (breathing machine)  · Values history: This document has questions about your views, beliefs, and how you feel and think about life  This information can help others choose the care that you would choose  Why are advance directives important? An advance directive helps you control your care  Although spoken wishes may be used, it is better to have your wishes written down  Spoken wishes can be misunderstood, or not followed  Treatments may be given even if you do not want them   An advance directive may make it easier for your family to make difficult choices about your care    Weight Management   Why it is important to manage your weight:  Being overweight increases your risk of health conditions such as heart disease, high blood pressure, type 2 diabetes, and certain types of cancer  It can also increase your risk for osteoarthritis, sleep apnea, and other respiratory problems  Aim for a slow, steady weight loss  Even a small amount of weight loss can lower your risk of health problems  How to lose weight safely:  A safe and healthy way to lose weight is to eat fewer calories and get regular exercise  You can lose up about 1 pound a week by decreasing the number of calories you eat by 500 calories each day  Healthy meal plan for weight management:  A healthy meal plan includes a variety of foods, contains fewer calories, and helps you stay healthy  A healthy meal plan includes the following:  · Eat whole-grain foods more often  A healthy meal plan should contain fiber  Fiber is the part of grains, fruits, and vegetables that is not broken down by your body  Whole-grain foods are healthy and provide extra fiber in your diet  Some examples of whole-grain foods are whole-wheat breads and pastas, oatmeal, brown rice, and bulgur  · Eat a variety of vegetables every day  Include dark, leafy greens such as spinach, kale, susan greens, and mustard greens  Eat yellow and orange vegetables such as carrots, sweet potatoes, and winter squash  · Eat a variety of fruits every day  Choose fresh or canned fruit (canned in its own juice or light syrup) instead of juice  Fruit juice has very little or no fiber  · Eat low-fat dairy foods  Drink fat-free (skim) milk or 1% milk  Eat fat-free yogurt and low-fat cottage cheese  Try low-fat cheeses such as mozzarella and other reduced-fat cheeses  · Choose meat and other protein foods that are low in fat  Choose beans or other legumes such as split peas or lentils   Choose fish, skinless poultry (chicken or turkey), or lean cuts of red meat (beef or pork)  Before you cook meat or poultry, cut off any visible fat  · Use less fat and oil  Try baking foods instead of frying them  Add less fat, such as margarine, sour cream, regular salad dressing and mayonnaise to foods  Eat fewer high-fat foods  Some examples of high-fat foods include french fries, doughnuts, ice cream, and cakes  · Eat fewer sweets  Limit foods and drinks that are high in sugar  This includes candy, cookies, regular soda, and sweetened drinks  Exercise:  Exercise at least 30 minutes per day on most days of the week  Some examples of exercise include walking, biking, dancing, and swimming  You can also fit in more physical activity by taking the stairs instead of the elevator or parking farther away from stores  Ask your healthcare provider about the best exercise plan for you  © Copyright [address] 2018 Information is for End User's use only and may not be sold, redistributed or otherwise used for commercial purposes   All illustrations and images included in CareNotes® are the copyrighted property of A [employer] A M [employer]  or [address] [place] [address]

## 2022-08-29 ENCOUNTER — TELEPHONE (OUTPATIENT)
Dept: FAMILY MEDICINE CLINIC | Facility: HOSPITAL | Age: 57
End: 2022-08-29

## 2022-08-29 NOTE — TELEPHONE ENCOUNTER
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

## 2022-08-30 DIAGNOSIS — R45.1 AGITATION: Primary | ICD-10-CM

## 2022-08-30 DIAGNOSIS — Z91.89 DRIVING SAFETY ISSUE: ICD-10-CM

## 2022-08-30 DIAGNOSIS — Z59.89 LIVING ACCOMMODATION ISSUES: ICD-10-CM

## 2022-08-30 SDOH — ECONOMIC STABILITY - INCOME SECURITY: OTHER PROBLEMS RELATED TO HOUSING AND ECONOMIC CIRCUMSTANCES: Z59.89

## 2022-08-30 NOTE — TELEPHONE ENCOUNTER
Spoke to [address] [place] [address] - [facility] place referral to social care management
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
## 2022-09-01 ENCOUNTER — OFFICE VISIT (OUTPATIENT)
Dept: FAMILY MEDICINE CLINIC | Facility: HOSPITAL | Age: 57
End: 2022-09-01
Payer: COMMERCIAL

## 2022-09-01 VITALS
TEMPERATURE: 97.4 F | WEIGHT: 232 LBS | HEIGHT: 72 IN | BODY MASS INDEX: 31.42 KG/M2 | DIASTOLIC BLOOD PRESSURE: 98 MMHG | SYSTOLIC BLOOD PRESSURE: 146 MMHG | HEART RATE: 92 BPM

## 2022-09-01 DIAGNOSIS — M54.12 CERVICAL RADICULOPATHY: Primary | ICD-10-CM

## 2022-09-01 DIAGNOSIS — I69.320 APHASIA AS LATE EFFECT OF CEREBROVASCULAR ACCIDENT: ICD-10-CM

## 2022-09-01 DIAGNOSIS — F41.9 ANXIETY: ICD-10-CM

## 2022-09-01 PROCEDURE — 99214 OFFICE O/P EST MOD 30 MIN: CPT | Performed by: INTERNAL MEDICINE

## 2022-09-01 RX ORDER — METHOCARBAMOL 500 MG/1
500 TABLET, FILM COATED ORAL 3 TIMES DAILY PRN
Qty: 30 TABLET | Refills: 0 | Status: SHIPPED | OUTPATIENT
Start: 2022-09-01

## 2022-09-01 NOTE — ASSESSMENT & PLAN NOTE
Pt irritable and angry today when I would not fill Diazepam, reviewed with pt that it is a short acting medication and not one I would fill for him as I did not feel it was appropriate nor the best choice, tried to discuss other tx options and tried to discuss therapy at [facility], pt angry and frustrated and kept saying no and slamming pill bottle down, visit ended as it was no longer resulting in any benefit and pt con't to be angry, security called and stood by but no intervention was needed    I have had a recent phone call from pts wife regarding his aggression and irritability, he has become more irritable and starting to get more physical with family and even with staff at [facility], no beneficial conversation was able to be held today d/t patients irritability and anger

## 2022-09-01 NOTE — PROGRESS NOTES
Assessment/Plan:    Cervical radiculopathy  Has h/o neck pain and here today requesting refill of methocarbamol - bottle from 2019, tried to discuss pain specifically but pt not cooperative, will refill rx but monitor closely    Anxiety  Pt irritable and angry today when I would not fill Diazepam, reviewed with pt that it is a short acting medication and not one I would fill for him as I did not feel it was appropriate nor the best choice, tried to discuss other tx options and tried to discuss therapy at [facility], pt angry and frustrated and kept saying no and slamming pill bottle down, visit ended as it was no longer resulting in any benefit and pt con't to be angry, security called and stood by but no intervention was needed    I have had a recent phone call from pts wife regarding his aggression and irritability, he has become more irritable and starting to get more physical with family and even with staff at [facility], no beneficial conversation was able to be held today d/t patients irritability and anger    Aphasia as late effect of cerebrovascular accident  Noting some improvement with continued therapy, con't to have issues with speech/communnication leading pt to become increasingly angry/irritable, discussed with Ciara Montana of care management and referral for social work placed       Diagnoses and all orders for this visit:    Cervical radiculopathy  -     methocarbamol (ROBAXIN) 500 mg tablet; Take 1 tablet (500 mg total) by mouth 3 (three) times a day as needed for muscle spasms    Anxiety    Aphasia as late effect of cerebrovascular accident      Cologuard given at last appt    BW 8/22  FLP and PSA 6/18      Subjective:      Patient ID: Josh Jaime is a 65 y o  male  HPI Pt here for medication refills - HPI limited d/t expressive aphasia    He showed me a bottle of Diazepam and Methocarbamol  Requesting refills of both rx    I tried to explain to pt that Diazepam is not a rx I would refill for him  I tried to address his anxiety  I asked pt how it was going at his therapy at [facility]  He became frustrated and slammed bottle down  Tried to discuss methocarbamol and back and/or neck pain but pt repeatedly showed me bottle of Diazepam and would not discuss anything else  I asked pt if I could call anyone else to discuss issues with and he states "NO, I'm Rodrigo"      Cologuard ordered at last visit    BW 8/22  FLP and PSA 6/18    Review of Systems   Unable to perform ROS: Other (expressive aphasia)   Musculoskeletal: Positive for back pain  Objective:    /98   Pulse 92   Temp (!) 97 4 °F (36 3 °C) (Tympanic)   Ht 6' (1 829 m)   Wt 105 kg (232 lb)   BMI 31 46 kg/m²      Physical Exam  Vitals and nursing note reviewed  Constitutional:       General: He is not in acute distress  Appearance: He is well-developed  He is not ill-appearing  Eyes:      General:         Right eye: No discharge  Left eye: No discharge  Conjunctiva/sclera: Conjunctivae normal    Neck:      Trachea: No tracheal deviation  Pulmonary:      Effort: Pulmonary effort is normal  No respiratory distress  Skin:     General: Skin is warm and dry  Coloration: Skin is not pale  Findings: No rash  Neurological:      Mental Status: He is alert  Mental status is at baseline  Motor: No abnormal muscle tone        Gait: Gait normal    Psychiatric:      Comments: Irritable and angry

## 2022-09-01 NOTE — ASSESSMENT & PLAN NOTE
Noting some improvement with continued therapy, con't to have issues with speech/communnication leading pt to become increasingly angry/irritable, discussed with Calin Ochoa of care management and referral for social work placed

## 2022-09-01 NOTE — ASSESSMENT & PLAN NOTE
Has h/o neck pain and here today requesting refill of methocarbamol - bottle from 2019, tried to discuss pain specifically but pt not cooperative, will refill rx but monitor closely

## 2022-09-06 ENCOUNTER — TELEPHONE (OUTPATIENT)
Dept: FAMILY MEDICINE CLINIC | Facility: HOSPITAL | Age: 57
End: 2022-09-06

## 2022-09-06 NOTE — TELEPHONE ENCOUNTER
Leeann from [facility] safe driving program would like to speak with Dr Brianna Burgos to get some feed back  She states that the patient was referred there but they cannot have him drive the vehicle because he does not have a valid drivers licence  Patient is driving his motor cycle to get there  She states that he has expressive and receptive aphasia  They have been trying to help him with obtaining drivers licence, but he does not seem to be following their directions and gets angry with them  She is not sure if this is normal behavior for him  Is he able to follow directions? She is trying to figure out how to help him  She can be reached until 12:45 today, and then after 2  She will be back in the office at 7 Am tomorrow as well

## 2022-09-07 ENCOUNTER — TELEPHONE (OUTPATIENT)
Dept: FAMILY MEDICINE CLINIC | Facility: HOSPITAL | Age: 57
End: 2022-09-07

## 2022-09-07 ENCOUNTER — PATIENT OUTREACH (OUTPATIENT)
Dept: FAMILY MEDICINE CLINIC | Facility: HOSPITAL | Age: 57
End: 2022-09-07

## 2022-09-07 NOTE — PROGRESS NOTES
MELANIE RYAN received referral from Dr Brianna Burgos for several concerns including question of competency and need for possible help in the home  MELANIE RYAN discussed case with RN SHELBY who also discussed that patient has not allowed for his wife to come to his appointments as of late  Patient does have his wife on the medical communication consent form but it is from 5/27/2020  MELANIE RYAN attempted to call patient ([phone]) to discuss resources/ services he may be interested in as well as to discuss if he would give verbal permission for MELANIE RYAN to speak with his wife  Patient did not answer  MELANIE RYAN left a message including MELANIE RYAN contact information and requested a call back

## 2022-09-07 NOTE — TELEPHONE ENCOUNTER
Pt's wife called stating that she was expecting a call from Hermila Helm or Wilberto Howard  States she did not receive any call yet, and thinks they have been calling pt's phone instead

## 2022-09-08 NOTE — TELEPHONE ENCOUNTER
Spoke to [facility] 9/8/22 705 am: Pt at [facility] 8/23/22, [facility] spoke to Rodrigo about re-obtaining  license  He was given written directions on process  He went back to office and was notified he mailed back his directions (he was to keep) and did not send check  Was again advised of directions/process  Collette states staff then noted pt drove off on motorcycle (with helmet)  He has not returned to [facility] office

## 2022-09-12 ENCOUNTER — PATIENT OUTREACH (OUTPATIENT)
Dept: FAMILY MEDICINE CLINIC | Facility: HOSPITAL | Age: 57
End: 2022-09-12

## 2022-09-12 NOTE — PROGRESS NOTES
MELANIE RYAN received referral from Dr Christina Cooley for several concerns including question of competency and need for possible help in the home  MELANIE RYAN discussed case with RN SHELBY who also discussed that patient has not allowed for his wife to come to his appointments as of late  Patient does have his wife on the medical communication consent form but it is from 5/27/2020       MELANIE RYAN attempted to call patient ([phone]) a second time to discuss resources/ services he may be interested in as well as to discuss if he would give verbal permission for MELANIE RYAN to speak with his wife  Patient did not answer  MELANIE RYAN left a message including MELANIE RYAN contact information  MELANIE RYAN also attempted to call patient's wife, Valentino Kirby, to request to speak with patient ([phone])  Melanie Verma did not answer, MELANIE RYAN left a message for Melanie Verma requesting to speak with patient  MELANIE RYAN will send unable to reach letter in outgoing mail to patient  MELANIE Ryan will close  Please re consult MELANIE RYAN as needed

## 2022-09-12 NOTE — LETTER
[facility]    Re: Lakisha Saha to Reach   9/12/2022       Dear Helen Austin am a [address]  and wanted to be certain you had information to contact me should you desire assistance with or have questions about non-medical aspects of your care such as [but not limited to] medical insurance, housing, transportation, material needs, or emergency needs  If I do not have an answer I will assist you in finding the appropriate agency or individual who can help  Please feel free to contact me at [phone] Thank You      Sincerely,         Elver Gamez, MSW, LSW

## 2022-09-13 ENCOUNTER — PATIENT OUTREACH (OUTPATIENT)
Dept: FAMILY MEDICINE CLINIC | Facility: HOSPITAL | Age: 57
End: 2022-09-13

## 2022-09-13 NOTE — PROGRESS NOTES
MELANIE RYAN received a voicemail from patient ([phone])  only stating his name and nothing else  MELANIE RYAN attempted to call patient back ([phone])  Patient did not answer  MELANIE RYAN left a message including MELANIE RYAN contact information  MELANIE RYAN sent letter in the mail to patient yesterday   MELANIE RYAN will keep referral closed but will be available for patient to contact MELANIE RYAN

## 2022-09-14 ENCOUNTER — PATIENT OUTREACH (OUTPATIENT)
Dept: FAMILY MEDICINE CLINIC | Facility: HOSPITAL | Age: 57
End: 2022-09-14

## 2022-09-14 DIAGNOSIS — F41.9 ANXIETY: Primary | ICD-10-CM

## 2022-09-14 NOTE — PROGRESS NOTES
MELANIE RYAN received a voicmail from patient's wife, Rosina Melendez ([phone]) who stated, "Quentin Councilman, this is Rosina Melendez  Thanks for reaching out to me  You had inquired about speaking with Martha Seay and I realized that you can't talk to me  Martha Seay did call and leave you a message and as you could hopefully your notice that he cannot speak, only has about 15 words in his vocabulary  But we're really looking for some services and hopefully this isn't going to be keep dragging out  I believe the best way to do this is to schedule an appointment with him  And if you do do that, if you could on a separate note call and leave a message up with that so I can put it on the calendar for him  My number is [phone]  Thank you  "    MELANIE RYAN called Staci Barger back [phone])  MELANIE RYAN discussed with Staci Barger that MELANIE RYAN can complete an assessment for patient with her but will be unable to disclose anything to her  MELANIE RYAN can also provide Staci Barger with resources  Staci Barger and MELANIE RYAN agreed that we will plan to speak  at 9:30 AM tomorrow (9/15/22) as long as MELANIE RYAN is not on maternity leave by then  MELANIE RYAN will follow up tomorrow regarding

## 2022-09-15 ENCOUNTER — PATIENT OUTREACH (OUTPATIENT)
Dept: FAMILY MEDICINE CLINIC | Facility: HOSPITAL | Age: 57
End: 2022-09-15

## 2022-09-15 NOTE — PROGRESS NOTES
MELANIE RYAN called patient's wife, Lou Hammonds ([phone]) as discussed at 9:30 AM to complete an assessment and discuss her concerns with patient  MELANIE RYAN explained to Reymundo Comfort that MELANIE RYAN can not disclose information on his chart but can obtain information from her, discuss her concerns and provide her with resources  Reymundo Comfort aware  MELANIE RYAN completed start of care psychosocial assessment with patient  Patient lives with his wife and one son in a home they own  Patient is disabled  Wife reports no financial issues and adequate health coverage  Patient is independent with all ADLs and does not use an assistive device  Patient's wife stating she drives him to appointments  Per wife, patient does have prior mental health history before his stroke but she is unsure of what diagnosis  MELANIE RYAN asked Reymundo Redd what her primary concerns were regarding patient  Currently concerns are agitations and easily angered, and trying to help him him  Per Reymundo Redd, [facility] waiver and they currently do not take their insurance  Through the waiver he gets PT and OT as well as one hour of mental health therapy  MELANIE RYAN suggested psychiatry and or neurology as well as possibly neuropsych  MELANIE RYAN discussed that patient would need to be agreeable to going to these providers  Reymundo Ivania stated that patient is already established with neurology but he has not seen the neurology in over two years  MELANIE RYAN discussed that it may take a while to get an appointment with these providers  MELANIE RYAN began to provide Reymundo Comfort with resources for above offices  Reymundo Ivania had asked MELANIE RYAN to clarify role  Reymundofrandy Redd had thought that MELANIE RYAN would be able to help patient get appointments sooner  MELANIE RYAN explained that MELANIE RYAN is not able to assist with getting patients into appointments sooner as MELANIE RYAN has no power to do so especially with the wait list at neurology and any of psychiatry   Reymundo Comfort aware and had no other questions, concerns or needs and not interested in additional support from SW CM at this time  MELANIE RYAN encouraged Osmel Galicia to contact patient's PCP office for any additional needs  SW CM routed note to patient's PCP Dr Kennedi NAVARRO CM will close  Please re consult MELANIE CM as needed

## 2022-10-10 ENCOUNTER — HOSPITAL ENCOUNTER (EMERGENCY)
Facility: HOSPITAL | Age: 57
Discharge: HOME | End: 2022-10-10
Attending: EMERGENCY MEDICINE
Payer: COMMERCIAL

## 2022-10-10 VITALS
SYSTOLIC BLOOD PRESSURE: 156 MMHG | DIASTOLIC BLOOD PRESSURE: 92 MMHG | RESPIRATION RATE: 18 BRPM | OXYGEN SATURATION: 98 % | HEART RATE: 113 BPM

## 2022-10-10 DIAGNOSIS — I10 HYPERTENSION: Primary | ICD-10-CM

## 2022-10-10 LAB
ALBUMIN SERPL BCP-MCNC: 4.3 G/DL (ref 3.5–5)
ALP SERPL-CCNC: 68 U/L (ref 46–116)
ALT SERPL W P-5'-P-CCNC: 44 U/L (ref 12–78)
ANION GAP SERPL CALCULATED.3IONS-SCNC: 9 MMOL/L (ref 4–13)
AST SERPL W P-5'-P-CCNC: 21 U/L (ref 5–45)
ATRIAL RATE: 106 BPM
BASOPHILS # BLD AUTO: 0.03 THOUSANDS/ÂΜL (ref 0–0.1)
BASOPHILS NFR BLD AUTO: 1 % (ref 0–1)
BILIRUB SERPL-MCNC: 0.6 MG/DL (ref 0.2–1)
BUN SERPL-MCNC: 14 MG/DL (ref 5–25)
CALCIUM SERPL-MCNC: 9.4 MG/DL (ref 8.3–10.1)
CARDIAC TROPONIN I PNL SERPL HS: 3 NG/L
CHLORIDE SERPL-SCNC: 105 MMOL/L (ref 96–108)
CO2 SERPL-SCNC: 29 MMOL/L (ref 21–32)
CREAT SERPL-MCNC: 1.27 MG/DL (ref 0.6–1.3)
EOSINOPHIL # BLD AUTO: 0.09 THOUSAND/ÂΜL (ref 0–0.61)
EOSINOPHIL NFR BLD AUTO: 2 % (ref 0–6)
ERYTHROCYTE [DISTWIDTH] IN BLOOD BY AUTOMATED COUNT: 12 % (ref 11.6–15.1)
GFR SERPL CREATININE-BSD FRML MDRD: 62 ML/MIN/1.73SQ M
GLUCOSE SERPL-MCNC: 111 MG/DL (ref 65–140)
HCT VFR BLD AUTO: 46.5 % (ref 36.5–49.3)
HGB BLD-MCNC: 16.7 G/DL (ref 12–17)
IMM GRANULOCYTES # BLD AUTO: 0 THOUSAND/UL (ref 0–0.2)
IMM GRANULOCYTES NFR BLD AUTO: 0 % (ref 0–2)
LYMPHOCYTES # BLD AUTO: 1.78 THOUSANDS/ÂΜL (ref 0.6–4.47)
LYMPHOCYTES NFR BLD AUTO: 32 % (ref 14–44)
MCH RBC QN AUTO: 33.7 PG (ref 26.8–34.3)
MCHC RBC AUTO-ENTMCNC: 35.9 G/DL (ref 31.4–37.4)
MCV RBC AUTO: 94 FL (ref 82–98)
MONOCYTES # BLD AUTO: 0.44 THOUSAND/ÂΜL (ref 0.17–1.22)
MONOCYTES NFR BLD AUTO: 8 % (ref 4–12)
NEUTROPHILS # BLD AUTO: 3.26 THOUSANDS/ÂΜL (ref 1.85–7.62)
NEUTS SEG NFR BLD AUTO: 57 % (ref 43–75)
NRBC BLD AUTO-RTO: 0 /100 WBCS
P AXIS: 71 DEGREES
PLATELET # BLD AUTO: 201 THOUSANDS/UL (ref 149–390)
PMV BLD AUTO: 9 FL (ref 8.9–12.7)
POTASSIUM SERPL-SCNC: 4.1 MMOL/L (ref 3.5–5.3)
PR INTERVAL: 186 MS
PROT SERPL-MCNC: 7.4 G/DL (ref 6.4–8.4)
QRS AXIS: 64 DEGREES
QRSD INTERVAL: 84 MS
QT INTERVAL: 336 MS
QTC INTERVAL: 446 MS
RBC # BLD AUTO: 4.95 MILLION/UL (ref 3.88–5.62)
SODIUM SERPL-SCNC: 143 MMOL/L (ref 135–147)
T WAVE AXIS: 64 DEGREES
VENTRICULAR RATE: 106 BPM
WBC # BLD AUTO: 5.6 THOUSAND/UL (ref 4.31–10.16)

## 2022-10-10 PROCEDURE — 36415 COLL VENOUS BLD VENIPUNCTURE: CPT

## 2022-10-10 PROCEDURE — 93005 ELECTROCARDIOGRAM TRACING: CPT

## 2022-10-10 PROCEDURE — 99283 EMERGENCY DEPT VISIT LOW MDM: CPT

## 2022-10-10 PROCEDURE — 80053 COMPREHEN METABOLIC PANEL: CPT | Performed by: EMERGENCY MEDICINE

## 2022-10-10 PROCEDURE — 99285 EMERGENCY DEPT VISIT HI MDM: CPT | Performed by: PHYSICIAN ASSISTANT

## 2022-10-10 PROCEDURE — 84484 ASSAY OF TROPONIN QUANT: CPT | Performed by: EMERGENCY MEDICINE

## 2022-10-10 PROCEDURE — 93010 ELECTROCARDIOGRAM REPORT: CPT | Performed by: INTERNAL MEDICINE

## 2022-10-10 PROCEDURE — 85025 COMPLETE CBC W/AUTO DIFF WBC: CPT | Performed by: EMERGENCY MEDICINE

## 2022-10-10 RX ORDER — LISINOPRIL 10 MG/1
10 TABLET ORAL DAILY
Qty: 30 TABLET | Refills: 0 | Status: SHIPPED | OUTPATIENT
Start: 2022-10-10 | End: 2022-11-09

## 2022-10-10 RX ORDER — LISINOPRIL 10 MG/1
10 TABLET ORAL DAILY
Qty: 30 TABLET | Refills: 0 | Status: SHIPPED | OUTPATIENT
Start: 2022-10-10 | End: 2022-10-10 | Stop reason: HOSPADM

## 2022-10-10 NOTE — ED PROVIDER NOTES
History  Chief Complaint   Patient presents with   • Hypertension     Pt states his BP has been high xmonths, denies symptoms  Patient is a 66 y/o M with h/o CVA, expressive aphasia, HTN that presents to the ED requesting blood pressure medication  Patient states his blood pressure has been elevated for 6 months  He is noncompliant with his meds and has not been taking them, but is willing to take his meds now  He denies chest pain, headaches or vision changes  History provided by:  Patient  Hypertension  Severity:  Moderate  Onset quality:  Gradual  Duration:  6 months  Timing:  Constant  Progression:  Unchanged  Chronicity:  Chronic  Context: noncompliance    Relieved by:  Nothing  Worsened by:  Nothing  Ineffective treatments:  None tried  Associated symptoms: no blurred vision, no chest pain, no confusion, no dizziness, no fever, no peripheral edema, no shortness of breath and no weakness    Risk factors: prior stroke        Prior to Admission Medications   Prescriptions Last Dose Informant Patient Reported?  Taking?   acetaminophen (TYLENOL) 325 mg tablet   No No   Sig: Take 2 tablets (650 mg total) by mouth every 6 (six) hours as needed for mild pain   ibuprofen (MOTRIN) 200 mg tablet   No No   Sig: Take 3 tablets (600 mg total) by mouth every 6 (six) hours as needed for moderate pain   methocarbamol (ROBAXIN) 500 mg tablet   No No   Sig: Take 1 tablet (500 mg total) by mouth 3 (three) times a day as needed for muscle spasms   multivitamin (THERAGRAN) TABS   Yes No   Sig: Take 1 tablet by mouth daily      Facility-Administered Medications: None       Past Medical History:   Diagnosis Date   • Arthritis    • CVA (cerebral vascular accident) ([facility] [address] )    • Hypertension        Past Surgical History:   Procedure Laterality Date   • CARDIAC CATHETERIZATION     • CARDIAC SURGERY     • TX EXC SKIN BENIG 1 1-2 CM TRUNK,ARM,LEG Right 6/14/2022    Procedure: EXCISION  BIOPSY LESION/MASS BACK;  Surgeon: Fili Romeo Sveta James MD;  Location:  MAIN OR;  Service: General   • TISSUE PLASMINOGEN ACTIVATOR (TPA), EIA(HISTORICAL)         Family History   Problem Relation Age of Onset   • COPD Mother    • Diabetes Mother    • Alzheimer's disease Mother    • No Known Problems Father    • Asthma Child      I have reviewed and agree with the history as documented  E-Cigarette/Vaping   • E-Cigarette Use Never User      E-Cigarette/Vaping Substances     Social History     Tobacco Use   • Smoking status: Former Smoker     Types: Cigarettes     Quit date: 2019     Years since quitting: 3 7   • Smokeless tobacco: Never Used   Vaping Use   • Vaping Use: Never used   Substance Use Topics   • Alcohol use: Not Currently     Comment: socially   • Drug use: Never       Review of Systems   Constitutional: Negative for chills and fever  HENT: Negative  Eyes: Negative for blurred vision and visual disturbance  Respiratory: Negative for shortness of breath  Cardiovascular: Negative for chest pain and leg swelling  Skin: Negative for color change and rash  Neurological: Positive for speech difficulty (expressive aphasia-chronic)  Negative for dizziness and weakness  Psychiatric/Behavioral: Negative for confusion  All other systems reviewed and are negative  Physical Exam  Physical Exam  Vitals and nursing note reviewed  Constitutional:       General: He is not in acute distress  Appearance: Normal appearance  He is well-developed, well-groomed and normal weight  He is not ill-appearing or diaphoretic  HENT:      Head: Normocephalic and atraumatic  Right Ear: Hearing normal       Left Ear: Hearing normal       Nose: Nose normal       Mouth/Throat:      Mouth: Mucous membranes are moist       Pharynx: Oropharynx is clear  Eyes:      Conjunctiva/sclera: Conjunctivae normal    Cardiovascular:      Rate and Rhythm: Normal rate and regular rhythm  Heart sounds: Normal heart sounds     Pulmonary:      Effort: Pulmonary effort is normal       Breath sounds: Normal breath sounds  No wheezing, rhonchi or rales  Musculoskeletal:         General: Normal range of motion  Cervical back: Normal range of motion  Right lower leg: No edema  Left lower leg: No edema  Skin:     General: Skin is warm and dry  Coloration: Skin is not jaundiced or pale  Findings: No rash  Neurological:      General: No focal deficit present  Mental Status: He is alert and oriented to person, place, and time  Cranial Nerves: No cranial nerve deficit  Motor: No weakness  Comments: Expressive aphasia-chronic   Psychiatric:         Mood and Affect: Mood normal          Speech: Speech normal          Behavior: Behavior is cooperative           Vital Signs  ED Triage Vitals [10/10/22 1317]   Temp Pulse Respirations Blood Pressure SpO2   -- (!) 113 18 162/92 98 %      Temp src Heart Rate Source Patient Position - Orthostatic VS BP Location FiO2 (%)   -- Monitor Sitting Right arm --      Pain Score       --           Vitals:    10/10/22 1317 10/10/22 1440   BP: 162/92 156/92   Pulse: (!) 113    Patient Position - Orthostatic VS: Sitting          Visual Acuity      ED Medications  Medications - No data to display    Diagnostic Studies  Results Reviewed     Procedure Component Value Units Date/Time    HS Troponin 0hr (reflex protocol) [[id number]]  (Normal) Collected: 10/10/22 1328    Lab Status: Final result Specimen: Blood from Arm, Left Updated: 10/10/22 1359     hs TnI 0hr 3 ng/L     Comprehensive metabolic panel [[id number]] Collected: 10/10/22 1328    Lab Status: Final result Specimen: Blood from Arm, Left Updated: 10/10/22 1351     Sodium 143 mmol/L      Potassium 4 1 mmol/L      Chloride 105 mmol/L      CO2 29 mmol/L      ANION GAP 9 mmol/L      BUN 14 mg/dL      Creatinine 1 27 mg/dL      Glucose 111 mg/dL      Calcium 9 4 mg/dL      AST 21 U/L      ALT 44 U/L      Alkaline Phosphatase 68 U/L      Total Protein 7 4 g/dL      Albumin 4 3 g/dL      Total Bilirubin 0 60 mg/dL      eGFR 62 ml/min/1 73sq m     Narrative:      [place] guidelines for Chronic Kidney Disease (CKD):   •  Stage 1 with normal or high GFR (GFR > 90 mL/min/1 73 square meters)  •  Stage 2 Mild CKD (GFR = 60-89 mL/min/1 73 square meters)  •  Stage 3A Moderate CKD (GFR = 45-59 mL/min/1 73 square meters)  •  Stage 3B Moderate CKD (GFR = 30-44 mL/min/1 73 square meters)  •  Stage 4 Severe CKD (GFR = 15-29 mL/min/1 73 square meters)  •  Stage 5 End Stage CKD (GFR <15 mL/min/1 73 square meters)  Note: GFR calculation is accurate only with a steady state creatinine    CBC and differential [[id number]] Collected: 10/10/22 1328    Lab Status: Final result Specimen: Blood from Arm, Left Updated: 10/10/22 1335     WBC 5 60 Thousand/uL      RBC 4 95 Million/uL      Hemoglobin 16 7 g/dL      Hematocrit 46 5 %      MCV 94 fL      MCH 33 7 pg      MCHC 35 9 g/dL      RDW 12 0 %      MPV 9 0 fL      Platelets 430 Thousands/uL      nRBC 0 /100 WBCs      Neutrophils Relative 57 %      Immat GRANS % 0 %      Lymphocytes Relative 32 %      Monocytes Relative 8 %      Eosinophils Relative 2 %      Basophils Relative 1 %      Neutrophils Absolute 3 26 Thousands/µL      Immature Grans Absolute 0 00 Thousand/uL      Lymphocytes Absolute 1 78 Thousands/µL      Monocytes Absolute 0 44 Thousand/µL      Eosinophils Absolute 0 09 Thousand/µL      Basophils Absolute 0 03 Thousands/µL                  No orders to display              Procedures  ECG 12 Lead Documentation Only    Date/Time: 10/10/2022 1:22 PM  Performed by: Philippe Long PA-C  Authorized by: Philippe Long PA-C     Indications / Diagnosis:  HTN  Patient location:  ED  Previous ECG:     Previous ECG:  Compared to current    Similarity:  No change  Rate:     ECG rate:  106    ECG rate assessment: tachycardic    Rhythm:     Rhythm: sinus tachycardia    Conduction: Conduction: normal    ST segments:     ST segments:  Normal  T waves:     T waves: normal               ED Course  ED Course as of 10/10/22 1655   Mon Oct 10, 2022   1504 Spoke with Dr Donna Almeida, via TT, she is fine with starting lisinopril  SBIRT 20yo+    Flowsheet Row Most Recent Value   SBIRT (25 yo +)    In order to provide better care to our patients, we are screening all of our patients for alcohol and drug use  Would it be okay to ask you these screening questions? Yes Filed at: 10/10/2022 1450   Initial Alcohol Screen: US AUDIT-C     1  How often do you have a drink containing alcohol? 0 Filed at: 10/10/2022 1450   2  How many drinks containing alcohol do you have on a typical day you are drinking? 0 Filed at: 10/10/2022 1450   3a  Male UNDER 65: How often do you have five or more drinks on one occasion? 0 Filed at: 10/10/2022 1450   3b  FEMALE Any Age, or MALE 65+: How often do you have 4 or more drinks on one occassion? 0 Filed at: 10/10/2022 1450   Audit-C Score 0 Filed at: 10/10/2022 1450   LUIS: How many times in the past year have you    Used an illegal drug or used a prescription medication for non-medical reasons? Never Filed at: 10/10/2022 1450                    MDM  Number of Diagnoses or Management Options  Hypertension: minor  Diagnosis management comments: Patient with h/o HTN, noncompliant with meds, requesting meds today, d/w PCP she agrees with treatment plan and will f/u with patient in the office          Amount and/or Complexity of Data Reviewed  Clinical lab tests: ordered and reviewed  Discuss the patient with other providers: yes    Patient Progress  Patient progress: stable      Disposition  Final diagnoses:   Hypertension     Time reflects when diagnosis was documented in both MDM as applicable and the Disposition within this note     Time User Action Codes Description Comment    10/10/2022  3:00 PM Kai Matos Dr Hypertension       ED Disposition     ED Disposition   Discharge    Condition   Stable    Date/Time   Mon Oct 10, 2022  3:02 PM    Comment   Ildefonsojamia Mckeon Boehringer II discharge to home/self care  Follow-up Information     Follow up With Specialties Details Why Wili Shin DO Internal Medicine, Family Medicine Schedule an appointment as soon as possible for a visit  For ebenezer Young  [address] [facility]  [address] [facility] [address] [phone]            Discharge Medication List as of 10/10/2022  3:04 PM      START taking these medications    Details   lisinopril (ZESTRIL) 10 mg tablet Take 1 tablet (10 mg total) by mouth daily, Starting Mon 10/10/2022, Normal         CONTINUE these medications which have NOT CHANGED    Details   acetaminophen (TYLENOL) 325 mg tablet Take 2 tablets (650 mg total) by mouth every 6 (six) hours as needed for mild pain, Starting Tue 6/14/2022, No Print      ibuprofen (MOTRIN) 200 mg tablet Take 3 tablets (600 mg total) by mouth every 6 (six) hours as needed for moderate pain, Starting Tue 6/14/2022, No Print      methocarbamol (ROBAXIN) 500 mg tablet Take 1 tablet (500 mg total) by mouth 3 (three) times a day as needed for muscle spasms, Starting u 9/1/2022, Normal      multivitamin (THERAGRAN) TABS Take 1 tablet by mouth daily, Historical Med             No discharge procedures on file      PDMP Review     None          ED Provider  Electronically Signed by           Megan Waite PA-C  10/10/22 2483

## 2022-10-10 NOTE — DISCHARGE INSTRUCTIONS
Take lisinopril daily  Follow up with family doctor for recheck  Monitor your blood pressure at home daily  Return to ER if symptoms worsen

## 2022-10-27 ENCOUNTER — APPOINTMENT (EMERGENCY)
Dept: ULTRASOUND IMAGING | Facility: HOSPITAL | Age: 57
End: 2022-10-27
Payer: COMMERCIAL

## 2022-10-27 ENCOUNTER — HOSPITAL ENCOUNTER (EMERGENCY)
Facility: HOSPITAL | Age: 57
Discharge: HOME | End: 2022-10-27
Attending: EMERGENCY MEDICINE
Payer: COMMERCIAL

## 2022-10-27 VITALS
HEIGHT: 72 IN | HEART RATE: 86 BPM | DIASTOLIC BLOOD PRESSURE: 87 MMHG | TEMPERATURE: 97.7 F | RESPIRATION RATE: 16 BRPM | BODY MASS INDEX: 31.42 KG/M2 | WEIGHT: 232 LBS | SYSTOLIC BLOOD PRESSURE: 134 MMHG | OXYGEN SATURATION: 99 %

## 2022-10-27 DIAGNOSIS — I10 HYPERTENSION: Primary | ICD-10-CM

## 2022-10-27 DIAGNOSIS — N28.9 RENAL INSUFFICIENCY: ICD-10-CM

## 2022-10-27 DIAGNOSIS — K80.20 GALLSTONE: ICD-10-CM

## 2022-10-27 DIAGNOSIS — K76.0 FATTY LIVER: ICD-10-CM

## 2022-10-27 LAB
ALBUMIN SERPL BCP-MCNC: 4.8 G/DL (ref 3.5–5)
ALP SERPL-CCNC: 70 U/L (ref 46–116)
ALT SERPL W P-5'-P-CCNC: 49 U/L (ref 12–78)
ANION GAP SERPL CALCULATED.3IONS-SCNC: 8 MMOL/L (ref 4–13)
AST SERPL W P-5'-P-CCNC: 29 U/L (ref 5–45)
BASOPHILS # BLD AUTO: 0.04 THOUSANDS/ÂΜL (ref 0–0.1)
BASOPHILS NFR BLD AUTO: 1 % (ref 0–1)
BILIRUB SERPL-MCNC: 1.2 MG/DL (ref 0.2–1)
BUN SERPL-MCNC: 15 MG/DL (ref 5–25)
CALCIUM SERPL-MCNC: 9.4 MG/DL (ref 8.3–10.1)
CHLORIDE SERPL-SCNC: 102 MMOL/L (ref 96–108)
CO2 SERPL-SCNC: 29 MMOL/L (ref 21–32)
CREAT SERPL-MCNC: 1.44 MG/DL (ref 0.6–1.3)
EOSINOPHIL # BLD AUTO: 0.1 THOUSAND/ÂΜL (ref 0–0.61)
EOSINOPHIL NFR BLD AUTO: 1 % (ref 0–6)
ERYTHROCYTE [DISTWIDTH] IN BLOOD BY AUTOMATED COUNT: 12.1 % (ref 11.6–15.1)
GFR SERPL CREATININE-BSD FRML MDRD: 53 ML/MIN/1.73SQ M
GLUCOSE SERPL-MCNC: 101 MG/DL (ref 65–140)
HCT VFR BLD AUTO: 50.4 % (ref 36.5–49.3)
HGB BLD-MCNC: 17.7 G/DL (ref 12–17)
IMM GRANULOCYTES # BLD AUTO: 0.01 THOUSAND/UL (ref 0–0.2)
IMM GRANULOCYTES NFR BLD AUTO: 0 % (ref 0–2)
LYMPHOCYTES # BLD AUTO: 2.87 THOUSANDS/ÂΜL (ref 0.6–4.47)
LYMPHOCYTES NFR BLD AUTO: 33 % (ref 14–44)
MCH RBC QN AUTO: 33.7 PG (ref 26.8–34.3)
MCHC RBC AUTO-ENTMCNC: 35.1 G/DL (ref 31.4–37.4)
MCV RBC AUTO: 96 FL (ref 82–98)
MONOCYTES # BLD AUTO: 0.65 THOUSAND/ÂΜL (ref 0.17–1.22)
MONOCYTES NFR BLD AUTO: 8 % (ref 4–12)
NEUTROPHILS # BLD AUTO: 5.03 THOUSANDS/ÂΜL (ref 1.85–7.62)
NEUTS SEG NFR BLD AUTO: 57 % (ref 43–75)
NRBC BLD AUTO-RTO: 0 /100 WBCS
PLATELET # BLD AUTO: 237 THOUSANDS/UL (ref 149–390)
PMV BLD AUTO: 9.3 FL (ref 8.9–12.7)
POTASSIUM SERPL-SCNC: 4.1 MMOL/L (ref 3.5–5.3)
PROT SERPL-MCNC: 8.2 G/DL (ref 6.4–8.4)
RBC # BLD AUTO: 5.25 MILLION/UL (ref 3.88–5.62)
SODIUM SERPL-SCNC: 139 MMOL/L (ref 135–147)
WBC # BLD AUTO: 8.7 THOUSAND/UL (ref 4.31–10.16)

## 2022-10-27 PROCEDURE — 80053 COMPREHEN METABOLIC PANEL: CPT | Performed by: EMERGENCY MEDICINE

## 2022-10-27 PROCEDURE — 76705 ECHO EXAM OF ABDOMEN: CPT

## 2022-10-27 PROCEDURE — 36415 COLL VENOUS BLD VENIPUNCTURE: CPT | Performed by: EMERGENCY MEDICINE

## 2022-10-27 PROCEDURE — 85025 COMPLETE CBC W/AUTO DIFF WBC: CPT | Performed by: EMERGENCY MEDICINE

## 2022-10-27 NOTE — ED CARE HANDOFF
Emergency Department Sign Out Note        Sign out and transfer of care from Dr Dionicio Zamudio  See Separate Emergency Department note  The patient, Umer Gomez, was evaluated by the previous provider for abdominal pain and hypertension evaluation  Workup Completed:  Ultrasound shows gallstones without acute inflammation white count is normal has some mild renal insufficiency will recheck blood pressure    ED Course / Workup Pending (followup): Needs outpatient follow-up                                  ED Course as of 10/27/22 9363   Thu Oct 27, 2022   1932 Repeat blood pressure is 134/87 okay for discharge     Procedures  MDM        Disposition  Final diagnoses:   Hypertension   Gallstone   Fatty liver   Renal insufficiency     Time reflects when diagnosis was documented in both MDM as applicable and the Disposition within this note     Time User Action Codes Description Comment    10/27/2022  7:08 PM [address]  Hypertension     10/27/2022  7:08 PM Murlean Bears Add [K80 20] Gallstone     10/27/2022  7:08 PM Murlean Bears Add [K76 0] Fatty liver     10/27/2022  7:09 PM Murlean Bears Add [N28 9] Renal insufficiency       ED Disposition     None      Follow-up Information    None       Patient's Medications   Discharge Prescriptions    No medications on file     No discharge procedures on file         ED Provider  Electronically Signed by     Illa Goodell, DO  10/27/22 6756

## 2022-10-27 NOTE — ED PROVIDER NOTES
History  Chief Complaint   Patient presents with   • Hypertension     Pt arrives into triage with c/o of liver issues and htn  Denies pain  Denies n/v/d/      81-year-old male with history of prior CVA with dense expressive aphasia, hypertension, cholelithiasis, fatty liver appears to be complaining of recurrent right upper quadrant abdominal pain  Concerned about his recent abnormal ultrasound  Denies vomiting, diarrhea, bloody stool and fever  States he has no pain currently  Due to patient's expressive aphasia it is unclear if he is currently having symptoms  Due to the difficulty in understanding patient I will repeat CBC and liver enzymes  Prior to Admission Medications   Prescriptions Last Dose Informant Patient Reported?  Taking?   acetaminophen (TYLENOL) 325 mg tablet   No No   Sig: Take 2 tablets (650 mg total) by mouth every 6 (six) hours as needed for mild pain   ibuprofen (MOTRIN) 200 mg tablet   No No   Sig: Take 3 tablets (600 mg total) by mouth every 6 (six) hours as needed for moderate pain   lisinopril (ZESTRIL) 10 mg tablet   No No   Sig: Take 1 tablet (10 mg total) by mouth daily   methocarbamol (ROBAXIN) 500 mg tablet   No No   Sig: Take 1 tablet (500 mg total) by mouth 3 (three) times a day as needed for muscle spasms   multivitamin (THERAGRAN) TABS   Yes No   Sig: Take 1 tablet by mouth daily      Facility-Administered Medications: None       Past Medical History:   Diagnosis Date   • Arthritis    • CVA (cerebral vascular accident) ([facility] [address] )    • Hypertension        Past Surgical History:   Procedure Laterality Date   • CARDIAC CATHETERIZATION     • CARDIAC SURGERY     • AR EXC SKIN BENIG 1 1-2 CM TRUNK,ARM,LEG Right 6/14/2022    Procedure: EXCISION  BIOPSY LESION/MASS BACK;  Surgeon: Helena López MD;  Location:  MAIN OR;  Service: General   • TISSUE PLASMINOGEN ACTIVATOR (TPA), EIA(HISTORICAL)         Family History   Problem Relation Age of Onset   • COPD Mother    • Diabetes Mother    • Alzheimer's disease Mother    • No Known Problems Father    • Asthma Child      I have reviewed and agree with the history as documented  E-Cigarette/Vaping   • E-Cigarette Use Never User      E-Cigarette/Vaping Substances     Social History     Tobacco Use   • Smoking status: Former Smoker     Types: Cigarettes     Quit date: 2019     Years since quitting: 3 8   • Smokeless tobacco: Never Used   Vaping Use   • Vaping Use: Never used   Substance Use Topics   • Alcohol use: Not Currently     Comment: socially   • Drug use: Never       Review of Systems   Reason unable to perform ROS: Expressive aphasia  Physical Exam  Physical Exam  Vitals and nursing note reviewed  Constitutional:       General: He is not in acute distress  Appearance: He is well-developed  He is obese  He is not ill-appearing or diaphoretic  HENT:      Head: Normocephalic and atraumatic  Right Ear: External ear normal       Left Ear: External ear normal       Nose: Nose normal       Mouth/Throat:      Mouth: Mucous membranes are moist       Pharynx: Oropharynx is clear  Eyes:      General: No scleral icterus  Conjunctiva/sclera: Conjunctivae normal       Pupils: Pupils are equal, round, and reactive to light  Neck:      Vascular: No JVD  Cardiovascular:      Rate and Rhythm: Regular rhythm  Tachycardia present  Pulses: Normal pulses  Heart sounds: Normal heart sounds  No murmur heard  Pulmonary:      Effort: Pulmonary effort is normal       Breath sounds: Normal breath sounds  Abdominal:      General: Bowel sounds are normal       Palpations: Abdomen is soft  There is no mass  Tenderness: There is no abdominal tenderness  There is no guarding or rebound  Musculoskeletal:         General: No tenderness  Normal range of motion  Cervical back: Normal range of motion and neck supple  No tenderness  Right lower leg: No edema  Left lower leg: No edema     Lymphadenopathy: Cervical: No cervical adenopathy  Skin:     General: Skin is warm and dry  Capillary Refill: Capillary refill takes less than 2 seconds  Findings: No rash  Neurological:      Mental Status: He is alert and oriented to person, place, and time  Cranial Nerves: No cranial nerve deficit  Coordination: Coordination normal       Deep Tendon Reflexes: Reflexes are normal and symmetric  Comments: Expressive aphasia   Psychiatric:         Behavior: Behavior normal       Comments: Appears frustrated and agitated at times but can be distracted           Vital Signs  ED Triage Vitals   Temperature Pulse Respirations Blood Pressure SpO2   10/27/22 1326 10/27/22 1326 10/27/22 1326 10/27/22 1326 10/27/22 1326   97 7 °F (36 5 °C) 101 20 142/95 99 %      Temp Source Heart Rate Source Patient Position - Orthostatic VS BP Location FiO2 (%)   10/27/22 1326 10/27/22 1326 10/27/22 1326 10/27/22 1326 --   Temporal Monitor Sitting Left arm       Pain Score       10/27/22 1707       No Pain           Vitals:    10/27/22 1326 10/27/22 1707 10/27/22 1906 10/27/22 1930   BP: 142/95 131/91 (!) 158/102 134/87   Pulse: 101 99 89 86   Patient Position - Orthostatic VS: Sitting Lying Lying Lying         Visual Acuity      ED Medications  Medications - No data to display    Diagnostic Studies  Results Reviewed     Procedure Component Value Units Date/Time    Comprehensive metabolic panel [[id number]]  (Abnormal) Collected: 10/27/22 1657    Lab Status: Final result Specimen: Blood from Arm, Right Updated: 10/27/22 1729     Sodium 139 mmol/L      Potassium 4 1 mmol/L      Chloride 102 mmol/L      CO2 29 mmol/L      ANION GAP 8 mmol/L      BUN 15 mg/dL      Creatinine 1 44 mg/dL      Glucose 101 mg/dL      Calcium 9 4 mg/dL      AST 29 U/L      ALT 49 U/L      Alkaline Phosphatase 70 U/L      Total Protein 8 2 g/dL      Albumin 4 8 g/dL      Total Bilirubin 1 20 mg/dL      eGFR 53 ml/min/1 73sq m     Narrative:      Consolidated [employer] guidelines for Chronic Kidney Disease (CKD):   •  Stage 1 with normal or high GFR (GFR > 90 mL/min/1 73 square meters)  •  Stage 2 Mild CKD (GFR = 60-89 mL/min/1 73 square meters)  •  Stage 3A Moderate CKD (GFR = 45-59 mL/min/1 73 square meters)  •  Stage 3B Moderate CKD (GFR = 30-44 mL/min/1 73 square meters)  •  Stage 4 Severe CKD (GFR = 15-29 mL/min/1 73 square meters)  •  Stage 5 End Stage CKD (GFR <15 mL/min/1 73 square meters)  Note: GFR calculation is accurate only with a steady state creatinine    CBC and differential [[id number]]  (Abnormal) Collected: 10/27/22 1657    Lab Status: Final result Specimen: Blood from Arm, Right Updated: 10/27/22 1711     WBC 8 70 Thousand/uL      RBC 5 25 Million/uL      Hemoglobin 17 7 g/dL      Hematocrit 50 4 %      MCV 96 fL      MCH 33 7 pg      MCHC 35 1 g/dL      RDW 12 1 %      MPV 9 3 fL      Platelets 452 Thousands/uL      nRBC 0 /100 WBCs      Neutrophils Relative 57 %      Immat GRANS % 0 %      Lymphocytes Relative 33 %      Monocytes Relative 8 %      Eosinophils Relative 1 %      Basophils Relative 1 %      Neutrophils Absolute 5 03 Thousands/µL      Immature Grans Absolute 0 01 Thousand/uL      Lymphocytes Absolute 2 87 Thousands/µL      Monocytes Absolute 0 65 Thousand/µL      Eosinophils Absolute 0 10 Thousand/µL      Basophils Absolute 0 04 Thousands/µL                  US right upper quadrant   Final Result by Osmany Gordon MD (10/27 1858)   Cholelithiasis, however no sonographic evidence of acute cholecystitis  No biliary ductal dilatation  Mild to moderate diffuse hepatic steatosis  Pancreas not visualized due to overlying bowel gas  Workstation performed: [id number]                    Procedures  Procedures         ED Course      signed out to Dr Sowmya Martinez at end of my shift  Await right upper quadrant abdominal ultrasound                            SBIRT 20yo+    Flowsheet Row Most Recent Value   SBIRT (25 yo +) In order to provide better care to our patients, we are screening all of our patients for alcohol and drug use  Would it be okay to ask you these screening questions? Yes Filed at: 10/27/2022 1357   Initial Alcohol Screen: US AUDIT-C     1  How often do you have a drink containing alcohol? 0 Filed at: 10/27/2022 1357   2  How many drinks containing alcohol do you have on a typical day you are drinking? 0 Filed at: 10/27/2022 1357   3a  Male UNDER 65: How often do you have five or more drinks on one occasion? 0 Filed at: 10/27/2022 1357   3b  FEMALE Any Age, or MALE 65+: How often do you have 4 or more drinks on one occassion? 0 Filed at: 10/27/2022 1357   Audit-C Score 0 Filed at: 10/27/2022 1357   LUIS: How many times in the past year have you    Used an illegal drug or used a prescription medication for non-medical reasons? Never Filed at: 10/27/2022 1357                    MDM    Disposition  Final diagnoses:   Hypertension   Gallstone   Fatty liver   Renal insufficiency     Time reflects when diagnosis was documented in both MDM as applicable and the Disposition within this note     Time User Action Codes Description Comment    10/27/2022  7:08 PM [address]  Hypertension     10/27/2022  7:08 PM Munson Christoph Add [K80 20] Gallstone     10/27/2022  7:08 PM Munson Christoph Add [K76 0] Fatty liver     10/27/2022  7:09 PM Munson Christoph Add [N28 9] Renal insufficiency       ED Disposition     ED Disposition   Discharge    Condition   Stable    Date/Time   u Oct 27, 2022  7:33 PM    Comment   [address] discharge to home/self care                 Follow-up Information     Follow up With Specialties Details Why Contact Info Additional Information     [address] Emergency Department Emergency Medicine  As needed, If symptoms worsen [address] [place] [address]  [phone] [address] Emergency Department, [address] [place] [address] [address] [facility] , [address] [place] [address] [facility] [place] [address]    [address]  In 1 week Must follow-up with a primary care physician Briana Lopez DO Internal Medicine, Family Medicine In 1 week  [facility]  [address] [facility]  [address] [place] [address] [phone]             Discharge Medication List as of 10/27/2022  7:38 PM      CONTINUE these medications which have NOT CHANGED    Details   acetaminophen (TYLENOL) 325 mg tablet Take 2 tablets (650 mg total) by mouth every 6 (six) hours as needed for mild pain, Starting Tue 6/14/2022, No Print      ibuprofen (MOTRIN) 200 mg tablet Take 3 tablets (600 mg total) by mouth every 6 (six) hours as needed for moderate pain, Starting Tue 6/14/2022, No Print      lisinopril (ZESTRIL) 10 mg tablet Take 1 tablet (10 mg total) by mouth daily, Starting Mon 10/10/2022, Until Wed 11/9/2022, Normal      methocarbamol (ROBAXIN) 500 mg tablet Take 1 tablet (500 mg total) by mouth 3 (three) times a day as needed for muscle spasms, Starting Thu 9/1/2022, Normal      multivitamin (THERAGRAN) TABS Take 1 tablet by mouth daily, Historical Med             No discharge procedures on file      PDMP Review     None          ED Provider  Electronically Signed by           Eryn Win DO  10/28/22 2654

## 2022-11-02 ENCOUNTER — TELEPHONE (OUTPATIENT)
Dept: FAMILY MEDICINE CLINIC | Facility: HOSPITAL | Age: 57
End: 2022-11-02

## 2022-11-02 NOTE — TELEPHONE ENCOUNTER
Pt's wife called with [employer] that they visited the [address]  Pt signed a release of records form and is trying to get a PT job  Pt's wife just wanted Dr Edilson Hoff to be aware she is on board with this, and hopes that Dr Edilson Hoff will agree to sign the form

## 2023-01-26 ENCOUNTER — APPOINTMENT (EMERGENCY)
Dept: CT IMAGING | Facility: HOSPITAL | Age: 58
End: 2023-01-26

## 2023-01-26 ENCOUNTER — APPOINTMENT (EMERGENCY)
Dept: ULTRASOUND IMAGING | Facility: HOSPITAL | Age: 58
End: 2023-01-26

## 2023-01-26 ENCOUNTER — HOSPITAL ENCOUNTER (EMERGENCY)
Facility: HOSPITAL | Age: 58
Discharge: HOME | End: 2023-01-26
Attending: EMERGENCY MEDICINE

## 2023-01-26 VITALS
DIASTOLIC BLOOD PRESSURE: 92 MMHG | BODY MASS INDEX: 31.15 KG/M2 | OXYGEN SATURATION: 97 % | WEIGHT: 230 LBS | TEMPERATURE: 97.5 F | RESPIRATION RATE: 18 BRPM | HEIGHT: 72 IN | SYSTOLIC BLOOD PRESSURE: 150 MMHG | HEART RATE: 80 BPM

## 2023-01-26 DIAGNOSIS — R10.84 GENERALIZED ABDOMINAL PAIN: Primary | ICD-10-CM

## 2023-01-26 LAB
ALBUMIN SERPL BCP-MCNC: 4.7 G/DL (ref 3.5–5)
ALP SERPL-CCNC: 79 U/L (ref 46–116)
ALT SERPL W P-5'-P-CCNC: 52 U/L (ref 12–78)
ANION GAP SERPL CALCULATED.3IONS-SCNC: 9 MMOL/L (ref 4–13)
APTT PPP: 25 SECONDS (ref 23–37)
AST SERPL W P-5'-P-CCNC: 38 U/L (ref 5–45)
ATRIAL RATE: 112 BPM
BASOPHILS # BLD AUTO: 0.04 THOUSANDS/ÂΜL (ref 0–0.1)
BASOPHILS NFR BLD AUTO: 1 % (ref 0–1)
BILIRUB SERPL-MCNC: 0.7 MG/DL (ref 0.2–1)
BILIRUB UR QL STRIP: NEGATIVE
BUN SERPL-MCNC: 13 MG/DL (ref 5–25)
CALCIUM SERPL-MCNC: 9.1 MG/DL (ref 8.3–10.1)
CARDIAC TROPONIN I PNL SERPL HS: 3 NG/L
CHLORIDE SERPL-SCNC: 103 MMOL/L (ref 96–108)
CLARITY UR: CLEAR
CO2 SERPL-SCNC: 27 MMOL/L (ref 21–32)
COLOR UR: YELLOW
CREAT SERPL-MCNC: 1.31 MG/DL (ref 0.6–1.3)
EOSINOPHIL # BLD AUTO: 0.08 THOUSAND/ÂΜL (ref 0–0.61)
EOSINOPHIL NFR BLD AUTO: 1 % (ref 0–6)
ERYTHROCYTE [DISTWIDTH] IN BLOOD BY AUTOMATED COUNT: 12.3 % (ref 11.6–15.1)
GFR SERPL CREATININE-BSD FRML MDRD: 60 ML/MIN/1.73SQ M
GLUCOSE SERPL-MCNC: 98 MG/DL (ref 65–140)
GLUCOSE UR STRIP-MCNC: NEGATIVE MG/DL
HCT VFR BLD AUTO: 47.5 % (ref 36.5–49.3)
HGB BLD-MCNC: 16.7 G/DL (ref 12–17)
HGB UR QL STRIP.AUTO: NEGATIVE
IMM GRANULOCYTES # BLD AUTO: 0 THOUSAND/UL (ref 0–0.2)
IMM GRANULOCYTES NFR BLD AUTO: 0 % (ref 0–2)
INR PPP: 0.98 (ref 0.84–1.19)
KETONES UR STRIP-MCNC: NEGATIVE MG/DL
LACTATE SERPL-SCNC: 1.3 MMOL/L (ref 0.5–2)
LEUKOCYTE ESTERASE UR QL STRIP: NEGATIVE
LIPASE SERPL-CCNC: 155 U/L (ref 73–393)
LYMPHOCYTES # BLD AUTO: 1.81 THOUSANDS/ÂΜL (ref 0.6–4.47)
LYMPHOCYTES NFR BLD AUTO: 29 % (ref 14–44)
MCH RBC QN AUTO: 33.5 PG (ref 26.8–34.3)
MCHC RBC AUTO-ENTMCNC: 35.2 G/DL (ref 31.4–37.4)
MCV RBC AUTO: 95 FL (ref 82–98)
MONOCYTES # BLD AUTO: 0.42 THOUSAND/ÂΜL (ref 0.17–1.22)
MONOCYTES NFR BLD AUTO: 7 % (ref 4–12)
NEUTROPHILS # BLD AUTO: 3.89 THOUSANDS/ÂΜL (ref 1.85–7.62)
NEUTS SEG NFR BLD AUTO: 62 % (ref 43–75)
NITRITE UR QL STRIP: NEGATIVE
NRBC BLD AUTO-RTO: 0 /100 WBCS
P AXIS: 76 DEGREES
PH UR STRIP.AUTO: 5.5 [PH]
PLATELET # BLD AUTO: 229 THOUSANDS/UL (ref 149–390)
PMV BLD AUTO: 9.2 FL (ref 8.9–12.7)
POTASSIUM SERPL-SCNC: 3.6 MMOL/L (ref 3.5–5.3)
PR INTERVAL: 170 MS
PROCALCITONIN SERPL-MCNC: 0.12 NG/ML
PROT SERPL-MCNC: 7.9 G/DL (ref 6.4–8.4)
PROT UR STRIP-MCNC: NEGATIVE MG/DL
PROTHROMBIN TIME: 13.7 SECONDS (ref 11.6–14.5)
QRS AXIS: 48 DEGREES
QRSD INTERVAL: 90 MS
QT INTERVAL: 312 MS
QTC INTERVAL: 425 MS
RBC # BLD AUTO: 4.99 MILLION/UL (ref 3.88–5.62)
SODIUM SERPL-SCNC: 139 MMOL/L (ref 135–147)
SP GR UR STRIP.AUTO: 1.01 (ref 1–1.03)
T WAVE AXIS: 69 DEGREES
UROBILINOGEN UR STRIP-ACNC: <2 MG/DL
VENTRICULAR RATE: 112 BPM
WBC # BLD AUTO: 6.24 THOUSAND/UL (ref 4.31–10.16)

## 2023-01-26 RX ADMIN — IOHEXOL 100 ML: 350 INJECTION, SOLUTION INTRAVENOUS at 15:48

## 2023-01-26 RX ADMIN — SODIUM CHLORIDE 1000 ML: 0.9 INJECTION, SOLUTION INTRAVENOUS at 14:40

## 2023-01-29 LAB
BACTERIA BLD CULT: NORMAL
BACTERIA BLD CULT: NORMAL

## 2023-01-31 LAB
BACTERIA BLD CULT: NORMAL
BACTERIA BLD CULT: NORMAL

## 2023-03-21 ENCOUNTER — TELEPHONE (OUTPATIENT)
Dept: ADMINISTRATIVE | Facility: OTHER | Age: 58
End: 2023-03-21

## 2023-03-21 NOTE — TELEPHONE ENCOUNTER
03/21/23 11:13 AM    The patient was called and a message could not be left on the phone number on file/ phone number on file is incorrect  Thank you    Mason Boogie MA  [facility]

## 2023-04-25 ENCOUNTER — APPOINTMENT (EMERGENCY)
Dept: CT IMAGING | Facility: HOSPITAL | Age: 58
End: 2023-04-25

## 2023-04-25 ENCOUNTER — HOSPITAL ENCOUNTER (EMERGENCY)
Facility: HOSPITAL | Age: 58
Discharge: HOME | End: 2023-04-25
Attending: EMERGENCY MEDICINE

## 2023-04-25 VITALS
BODY MASS INDEX: 33.68 KG/M2 | HEART RATE: 100 BPM | DIASTOLIC BLOOD PRESSURE: 100 MMHG | WEIGHT: 248.68 LBS | RESPIRATION RATE: 20 BRPM | SYSTOLIC BLOOD PRESSURE: 165 MMHG | OXYGEN SATURATION: 99 % | HEIGHT: 72 IN | TEMPERATURE: 98.7 F

## 2023-04-25 DIAGNOSIS — K80.20 CHOLELITHIASIS: ICD-10-CM

## 2023-04-25 DIAGNOSIS — R16.1 SPLEEN ENLARGED: ICD-10-CM

## 2023-04-25 DIAGNOSIS — R10.9 ABDOMINAL PAIN: Primary | ICD-10-CM

## 2023-04-25 LAB
ALBUMIN SERPL BCP-MCNC: 4.6 G/DL (ref 3.5–5)
ALP SERPL-CCNC: 77 U/L (ref 34–104)
ALT SERPL W P-5'-P-CCNC: 42 U/L (ref 7–52)
ANION GAP SERPL CALCULATED.3IONS-SCNC: 9 MMOL/L (ref 4–13)
AST SERPL W P-5'-P-CCNC: 27 U/L (ref 13–39)
BASOPHILS # BLD AUTO: 0.05 THOUSANDS/ΜL (ref 0–0.1)
BASOPHILS NFR BLD AUTO: 1 % (ref 0–1)
BILIRUB SERPL-MCNC: 0.84 MG/DL (ref 0.2–1)
BUN SERPL-MCNC: 11 MG/DL (ref 5–25)
CALCIUM SERPL-MCNC: 10.1 MG/DL (ref 8.4–10.2)
CARDIAC TROPONIN I PNL SERPL HS: 4 NG/L
CHLORIDE SERPL-SCNC: 103 MMOL/L (ref 96–108)
CO2 SERPL-SCNC: 29 MMOL/L (ref 21–32)
CREAT SERPL-MCNC: 1.19 MG/DL (ref 0.6–1.3)
EOSINOPHIL # BLD AUTO: 0.11 THOUSAND/ΜL (ref 0–0.61)
EOSINOPHIL NFR BLD AUTO: 2 % (ref 0–6)
ERYTHROCYTE [DISTWIDTH] IN BLOOD BY AUTOMATED COUNT: 12.1 % (ref 11.6–15.1)
GFR SERPL CREATININE-BSD FRML MDRD: 67 ML/MIN/1.73SQ M
GLUCOSE SERPL-MCNC: 130 MG/DL (ref 65–140)
HCT VFR BLD AUTO: 49.9 % (ref 36.5–49.3)
HGB BLD-MCNC: 17.5 G/DL (ref 12–17)
IMM GRANULOCYTES # BLD AUTO: 0.02 THOUSAND/UL (ref 0–0.2)
IMM GRANULOCYTES NFR BLD AUTO: 0 % (ref 0–2)
LACTATE SERPL-SCNC: 1.7 MMOL/L (ref 0.5–2)
LIPASE SERPL-CCNC: 37 U/L (ref 11–82)
LYMPHOCYTES # BLD AUTO: 1.66 THOUSANDS/ΜL (ref 0.6–4.47)
LYMPHOCYTES NFR BLD AUTO: 24 % (ref 14–44)
MCH RBC QN AUTO: 33.2 PG (ref 26.8–34.3)
MCHC RBC AUTO-ENTMCNC: 35.1 G/DL (ref 31.4–37.4)
MCV RBC AUTO: 95 FL (ref 82–98)
MONOCYTES # BLD AUTO: 0.53 THOUSAND/ΜL (ref 0.17–1.22)
MONOCYTES NFR BLD AUTO: 8 % (ref 4–12)
NEUTROPHILS # BLD AUTO: 4.54 THOUSANDS/ΜL (ref 1.85–7.62)
NEUTS SEG NFR BLD AUTO: 65 % (ref 43–75)
NRBC BLD AUTO-RTO: 0 /100 WBCS
PLATELET # BLD AUTO: 223 THOUSANDS/UL (ref 149–390)
PMV BLD AUTO: 9.2 FL (ref 8.9–12.7)
POTASSIUM SERPL-SCNC: 4.1 MMOL/L (ref 3.5–5.3)
PROT SERPL-MCNC: 7.3 G/DL (ref 6.4–8.4)
RBC # BLD AUTO: 5.27 MILLION/UL (ref 3.88–5.62)
SODIUM SERPL-SCNC: 141 MMOL/L (ref 135–147)
WBC # BLD AUTO: 6.91 THOUSAND/UL (ref 4.31–10.16)

## 2023-04-25 RX ORDER — KETOROLAC TROMETHAMINE 30 MG/ML
15 INJECTION, SOLUTION INTRAMUSCULAR; INTRAVENOUS ONCE
Status: COMPLETED | OUTPATIENT
Start: 2023-04-25 | End: 2023-04-25

## 2023-04-25 RX ADMIN — IOHEXOL 100 ML: 350 INJECTION, SOLUTION INTRAVENOUS at 13:01

## 2023-04-25 RX ADMIN — KETOROLAC TROMETHAMINE 15 MG: 30 INJECTION, SOLUTION INTRAMUSCULAR; INTRAVENOUS at 12:33

## 2023-04-25 RX ADMIN — SODIUM CHLORIDE 1000 ML: 0.9 INJECTION, SOLUTION INTRAVENOUS at 12:33

## 2023-04-26 LAB
ATRIAL RATE: 117 BPM
P AXIS: 66 DEGREES
PR INTERVAL: 170 MS
QRS AXIS: 64 DEGREES
QRSD INTERVAL: 88 MS
QT INTERVAL: 310 MS
QTC INTERVAL: 432 MS
T WAVE AXIS: 59 DEGREES
VENTRICULAR RATE: 117 BPM

## 2023-04-26 NOTE — ED PROVIDER NOTES
History  Chief Complaint   Patient presents with   • Abdominal Pain     C/o gallstones but unsure how long with the pain  States was dx in 2022  Denies meds for pain  71-year-old male presents for evaluation of right upper quadrant abdominal pain that has been intermittent for the last few weeks  No specific alleviating factors  Aggravated by palpation more prominent on right lateral ribs  Patient reports a prior history of gallstones  No pain medication prior to arrival   Patient with a prior history of CVA with associated expressive aphasia  Prior to Admission Medications   Prescriptions Last Dose Informant Patient Reported?  Taking?   acetaminophen (TYLENOL) 325 mg tablet   No No   Sig: Take 2 tablets (650 mg total) by mouth every 6 (six) hours as needed for mild pain   ibuprofen (MOTRIN) 200 mg tablet   No No   Sig: Take 3 tablets (600 mg total) by mouth every 6 (six) hours as needed for moderate pain   lisinopril (ZESTRIL) 10 mg tablet   No No   Sig: Take 1 tablet (10 mg total) by mouth daily   methocarbamol (ROBAXIN) 500 mg tablet   No No   Sig: Take 1 tablet (500 mg total) by mouth 3 (three) times a day as needed for muscle spasms   multivitamin (THERAGRAN) TABS   Yes No   Sig: Take 1 tablet by mouth daily      Facility-Administered Medications: None       Past Medical History:   Diagnosis Date   • CVA (cerebral vascular accident) ([facility] [address] )    • Hypertension        Past Surgical History:   Procedure Laterality Date   • CARDIAC CATHETERIZATION     • CARDIAC SURGERY     • [place] EXC B9 LESION MRGN XCP SK TG T/A/L 1 1-2 0 CM Right 6/14/2022    Procedure: EXCISION  BIOPSY LESION/MASS BACK;  Surgeon: Nimesh Kimbrough MD;  Location:  MAIN OR;  Service: General   • TISSUE PLASMINOGEN ACTIVATOR (TPA), EIA(HISTORICAL)         Family History   Problem Relation Age of Onset   • COPD Mother    • Diabetes Mother    • Alzheimer's disease Mother    • No Known Problems Father    • Asthma Child      I have reviewed and agree with the history as documented  E-Cigarette/Vaping   • E-Cigarette Use Never User      E-Cigarette/Vaping Substances   • Nicotine No    • THC No    • CBD No    • Flavoring No    • Other No    • Unknown No      Social History     Tobacco Use   • Smoking status: Former     Types: Cigarettes     Quit date: 2019     Years since quittin 3   • Smokeless tobacco: Never   Vaping Use   • Vaping Use: Never used   Substance Use Topics   • Alcohol use: Not Currently   • Drug use: Never       Review of Systems   Constitutional: Negative for fever  Gastrointestinal: Positive for abdominal pain  Physical Exam  Physical Exam  Vitals and nursing note reviewed  Constitutional:       General: He is not in acute distress  Appearance: He is well-developed  HENT:      Head: Normocephalic and atraumatic  Right Ear: External ear normal       Left Ear: External ear normal       Nose: Nose normal    Eyes:      General: No scleral icterus  Pulmonary:      Effort: Pulmonary effort is normal  No respiratory distress  Abdominal:      General: There is no distension  Palpations: Abdomen is soft  Tenderness: There is abdominal tenderness in the right upper quadrant  Comments: Tender to palpation of right upper quadrant also tender along right lateral ribs  No overlying skin changes  No crepitus  Musculoskeletal:         General: No deformity  Normal range of motion  Cervical back: Normal range of motion and neck supple  Skin:     General: Skin is warm  Findings: No rash  Neurological:      General: No focal deficit present  Mental Status: He is alert        Gait: Gait normal    Psychiatric:         Mood and Affect: Mood normal          Vital Signs  ED Triage Vitals [23 1110]   Temperature Pulse Respirations Blood Pressure SpO2   98 7 °F (37 1 °C) (!) 121 18 (!) 178/104 99 %      Temp Source Heart Rate Source Patient Position - Orthostatic VS BP Location FiO2 (%)   Oral Monitor Sitting Left arm --      Pain Score       5           Vitals:    04/25/23 1110 04/25/23 1512   BP: (!) 178/104 165/100   Pulse: (!) 121 100   Patient Position - Orthostatic VS: Sitting Standing         Visual Acuity      ED Medications  Medications   sodium chloride 0 9 % bolus 1,000 mL (0 mL Intravenous Stopped 4/25/23 1601)   ketorolac (TORADOL) injection 15 mg (15 mg Intravenous Given 4/25/23 1233)   iohexol (OMNIPAQUE) 350 MG/ML injection (MULTI-DOSE) 100 mL (100 mL Intravenous Given 4/25/23 1301)       Diagnostic Studies  Results Reviewed     Procedure Component Value Units Date/Time    HS Troponin 0hr (reflex protocol) [[id number]]  (Normal) Collected: 04/25/23 1129    Lab Status: Final result Specimen: Blood from Arm, Left Updated: 04/25/23 1224     hs TnI 0hr 4 ng/L     Comprehensive metabolic panel [[id number]] Collected: 04/25/23 1129    Lab Status: Final result Specimen: Blood from Arm, Left Updated: 04/25/23 1218     Sodium 141 mmol/L      Potassium 4 1 mmol/L      Chloride 103 mmol/L      CO2 29 mmol/L      ANION GAP 9 mmol/L      BUN 11 mg/dL      Creatinine 1 19 mg/dL      Glucose 130 mg/dL      Calcium 10 1 mg/dL      AST 27 U/L      ALT 42 U/L      Alkaline Phosphatase 77 U/L      Total Protein 7 3 g/dL      Albumin 4 6 g/dL      Total Bilirubin 0 84 mg/dL      eGFR 67 ml/min/1 73sq m     Narrative:      Titi guidelines for Chronic Kidney Disease (CKD):   •  Stage 1 with normal or high GFR (GFR > 90 mL/min/1 73 square meters)  •  Stage 2 Mild CKD (GFR = 60-89 mL/min/1 73 square meters)  •  Stage 3A Moderate CKD (GFR = 45-59 mL/min/1 73 square meters)  •  Stage 3B Moderate CKD (GFR = 30-44 mL/min/1 73 square meters)  •  Stage 4 Severe CKD (GFR = 15-29 mL/min/1 73 square meters)  •  Stage 5 End Stage CKD (GFR <15 mL/min/1 73 square meters)  Note: GFR calculation is accurate only with a steady state creatinine    Lipase [[id number]]  (Normal) Collected: 04/25/23 1129    Lab Status: Final result Specimen: Blood from Arm, Left Updated: 04/25/23 1218     Lipase 37 u/L     Lactic acid, plasma (w/reflex if result > 2 0) [[id number]]  (Normal) Collected: 04/25/23 1141    Lab Status: Final result Specimen: Blood from Arm, Left Updated: 04/25/23 1204     LACTIC ACID 1 7 mmol/L     Narrative:      Result may be elevated if tourniquet was used during collection  CBC and differential [[id number]]  (Abnormal) Collected: 04/25/23 1129    Lab Status: Final result Specimen: Blood from Arm, Left Updated: 04/25/23 1137     WBC 6 91 Thousand/uL      RBC 5 27 Million/uL      Hemoglobin 17 5 g/dL      Hematocrit 49 9 %      MCV 95 fL      MCH 33 2 pg      MCHC 35 1 g/dL      RDW 12 1 %      MPV 9 2 fL      Platelets 183 Thousands/uL      nRBC 0 /100 WBCs      Neutrophils Relative 65 %      Immat GRANS % 0 %      Lymphocytes Relative 24 %      Monocytes Relative 8 %      Eosinophils Relative 2 %      Basophils Relative 1 %      Neutrophils Absolute 4 54 Thousands/µL      Immature Grans Absolute 0 02 Thousand/uL      Lymphocytes Absolute 1 66 Thousands/µL      Monocytes Absolute 0 53 Thousand/µL      Eosinophils Absolute 0 11 Thousand/µL      Basophils Absolute 0 05 Thousands/µL                  CT abdomen pelvis with contrast   Final Result by Juan David Nicholson MD (04/25 1421)      Cholelithiasis with underdistended gallbladder, somewhat limiting evaluation, however no convincing evidence of acute cholecystitis or biliary ductal dilatation  Consider further evaluation with HIDA scan to better assess for acute/chronic    cholecystitis  No other acute abdominal or pelvic pathology  No bowel obstruction  Normal appendix  Evaluation for bowel inflammation elsewhere somewhat limited by underdistention and lack of oral contrast, however no convincing inflammatory changes  Please note mild inflammation may not be apparent  Additional findings as above  Workstation performed: [id number]                    Procedures  Procedures         ED Course                               SBIRT 20yo+    Flowsheet Row Most Recent Value   Initial Alcohol Screen: US AUDIT-C     1  How often do you have a drink containing alcohol? 0 Filed at: 04/25/2023 1114   2  How many drinks containing alcohol do you have on a typical day you are drinking? 0 Filed at: 04/25/2023 1114   3a  Male UNDER 65: How often do you have five or more drinks on one occasion? 0 Filed at: 04/25/2023 1114   Audit-C Score 0 Filed at: 04/25/2023 1114   LUIS: How many times in the past year have you    Used an illegal drug or used a prescription medication for non-medical reasons? Never Filed at: 04/25/2023 1114                    Medical Decision Making  31-year-old male presenting with abdominal pain  Differential diagnosis includes but not limited to cholecystitis, gastritis, shingles  Obtain labs, EKG, CT abdomen pelvis  Symptom control    Discussed all results with patient  Follow-up with general surgery and PCP  Return precautions discussed  Amount and/or Complexity of Data Reviewed  External Data Reviewed: labs and notes  Labs: ordered  Radiology: ordered  ECG/medicine tests: ordered and independent interpretation performed  Risk  Prescription drug management  Disposition  Final diagnoses:   Abdominal pain   Cholelithiasis   Spleen enlarged     Time reflects when diagnosis was documented in both MDM as applicable and the Disposition within this note     Time User Action Codes Description Comment    4/25/2023  3:50 PM Auddk Santiagoe Add [R10 9] Abdominal pain     4/25/2023  3:50 PM Auddk Santiagoe Add [K80 20] Cholelithiasis     4/25/2023  3:50 PM Penelope Santiagoe Add [R16 1] Spleen enlarged       ED Disposition     ED Disposition   Discharge    Condition   Stable    Date/Time   Tue Apr 25, 2023  3:50 PM    Comment   [address] discharge to home/self care  Follow-up Information     Follow up With Specialties Details Why Contact Info Additional Ever Flores 9632, DO Internal Medicine, Family Medicine   [facility]  [address]  [address] [address] [phone]       [address]  [address] [place] [id number]  [place], [address] [facility], [address], [place], [place], [address]     [facility] [address] Emergency Department Emergency Medicine  If symptoms worsen [address] [place], [address]  [address] [facility] Emergency Department, [address], [place], [address]          Discharge Medication List as of 4/25/2023  3:50 PM      CONTINUE these medications which have NOT CHANGED    Details   acetaminophen (TYLENOL) 325 mg tablet Take 2 tablets (650 mg total) by mouth every 6 (six) hours as needed for mild pain, Starting Tue 6/14/2022, No Print      ibuprofen (MOTRIN) 200 mg tablet Take 3 tablets (600 mg total) by mouth every 6 (six) hours as needed for moderate pain, Starting Tue 6/14/2022, No Print      lisinopril (ZESTRIL) 10 mg tablet Take 1 tablet (10 mg total) by mouth daily, Starting Mon 10/10/2022, Until Wed 11/9/2022, Normal      methocarbamol (ROBAXIN) 500 mg tablet Take 1 tablet (500 mg total) by mouth 3 (three) times a day as needed for muscle spasms, Starting Thu 9/1/2022, Normal      multivitamin (THERAGRAN) TABS Take 1 tablet by mouth daily, Historical Med             No discharge procedures on file      PDMP Review     None          ED Provider  Electronically Signed by           Socrates Dunlap DO  04/25/23 2039

## 2023-06-26 ENCOUNTER — OFFICE VISIT (OUTPATIENT)
Dept: SURGERY | Facility: HOSPITAL | Age: 58
End: 2023-06-26
Payer: COMMERCIAL

## 2023-06-26 VITALS — RESPIRATION RATE: 18 BRPM | BODY MASS INDEX: 33.59 KG/M2 | WEIGHT: 248 LBS | HEIGHT: 72 IN | TEMPERATURE: 98.2 F

## 2023-06-26 DIAGNOSIS — K80.20 CALCULUS OF GALLBLADDER WITHOUT CHOLECYSTITIS WITHOUT OBSTRUCTION: Primary | ICD-10-CM

## 2023-06-26 PROCEDURE — 99213 OFFICE O/P EST LOW 20 MIN: CPT | Performed by: SURGERY

## 2023-06-28 NOTE — PROGRESS NOTES
Assessment/Plan:   Vince Leon is a 63 y  o male who is here for Cholelithiasis (Patient has been dealing with gallstones since 10/28/22  Patient states he suffers from right quad abd pain and back pain  )    Plan: Cholelithiasis - discussed operative vs conservative mgt, surgical approaches, risks and benefits and patient and family understands that it very difficult to know if his recent episode of pain was biliary in nature especially with communication diffuculty  Pt and family understand and wishes to proceed with conservative mgt with abdominal pain log to see if there is a post prandial or fatty food intolerance  F/u prn  Preoperative Clearance: None    HPI:  Vince Leon is a 63 y  o male who was referred for evaluation of Cholelithiasis (Patient has been dealing with gallstones since 10/28/22  Patient states he suffers from right quad abd pain and back pain  )    Currently no pain  ROS:  General ROS: negative  negative for - chills, fatigue, fever or night sweats, weight loss  Respiratory ROS: no cough, shortness of breath, or wheezing  Cardiovascular ROS: no chest pain or dyspnea on exertion  Genito-Urinary ROS: no dysuria, trouble voiding, or hematuria  Musculoskeletal ROS: negative for - gait disturbance, joint pain or muscle pain  Neurological ROS: no TIA or stroke symptoms  No pain currently    [unfilled]  Patient has no known allergies      Current Outpatient Medications:   •  ibuprofen (MOTRIN) 200 mg tablet, Take 3 tablets (600 mg total) by mouth every 6 (six) hours as needed for moderate pain, Disp: 60 tablet, Rfl: 0  •  multivitamin (THERAGRAN) TABS, Take 1 tablet by mouth daily, Disp: , Rfl:   •  acetaminophen (TYLENOL) 325 mg tablet, Take 2 tablets (650 mg total) by mouth every 6 (six) hours as needed for mild pain (Patient not taking: Reported on 6/26/2023), Disp: 60 tablet, Rfl: 0  •  lisinopril (ZESTRIL) 10 mg tablet, Take 1 tablet (10 mg total) by mouth daily, Disp: 30 tablet, Rfl: 0  •  methocarbamol (ROBAXIN) 500 mg tablet, Take 1 tablet (500 mg total) by mouth 3 (three) times a day as needed for muscle spasms (Patient not taking: Reported on 6/26/2023), Disp: 30 tablet, Rfl: 0  Past Medical History:   Diagnosis Date   • CVA (cerebral vascular accident) ([address] )    • Hypertension      Past Surgical History:   Procedure Laterality Date   • CARDIAC CATHETERIZATION     • CARDIAC SURGERY     • [place] EXC B9 LESION MRGN XCP SK TG T/A/L 1 1-2 0 CM Right 6/14/2022    Procedure: EXCISION  BIOPSY LESION/MASS BACK;  Surgeon: Fina Rashid MD;  Location:  MAIN OR;  Service: General   • TISSUE PLASMINOGEN ACTIVATOR (TPA), EIA(HISTORICAL)       Family History   Problem Relation Age of Onset   • COPD Mother    • Diabetes Mother    • Alzheimer's disease Mother    • No Known Problems Father    • Asthma Child       reports that he quit smoking about 4 years ago  His smoking use included cigarettes  He has never used smokeless tobacco  He reports that he does not currently use alcohol  He reports that he does not use drugs  Labs:   Lab Results   Component Value Date    WBC 6 91 04/25/2023    WBC 6 24 01/26/2023    WBC 8 70 10/27/2022    HGB 17 5 (H) 04/25/2023    HGB 16 7 01/26/2023    HGB 17 7 (H) 10/27/2022     04/25/2023     01/26/2023     10/27/2022     Lab Results   Component Value Date    ALT 42 04/25/2023    ALT 52 01/26/2023    ALT 49 10/27/2022    ALT 15 06/07/2018    AST 27 04/25/2023    AST 38 01/26/2023    AST 29 10/27/2022    AST 14 06/07/2018     This SmartLink has not been configured with any valid records         PHYSICAL EXAM  General Appearance:    Alert, cooperative, no distress,    Head:    Normocephalic without obvious abnormality   Eyes:    PERRL, conjunctiva/corneas clear, EOM's intact        Neck:   Supple, no adenopathy, no JVD   Back:     Symmetric, no spinal or CVA tenderness   Lungs:     Clear to auscultation bilaterally, no wheezing or "rhonchi   Heart:    Regular rate and rhythm, S1 and S2 normal, no murmur   Abdomen:     Soft +BS ND nt   Extremities:   Extremities normal  No clubbing, cyanosis or edema   Psych:   Normal Affect,    Neurologic:  Skin:   CNII-XII intact  Strength symmetric, speech intact    Warm, dry, intact, no visible rashes or lesions         Physical Exam              Some portions of this record may have been generated with voice recognition software  There may be translation, syntax,  or grammatical errors  Occasional wrong word or \"sound-a-like\" substitutions may have occurred due to the inherent limitations of the voice recognition software  Read the chart carefully and recognize, using context, where substitutions may have occurred  If you have any questions, please contact the dictating provider for clarification or correction, as needed  This encounter has been coded by a non-certified coder     "

## 2023-08-16 ENCOUNTER — VBI (OUTPATIENT)
Dept: ADMINISTRATIVE | Facility: OTHER | Age: 58
End: 2023-08-16

## 2023-09-22 DIAGNOSIS — R91.1 PULMONARY NODULE, RIGHT: Primary | ICD-10-CM

## 2023-09-25 ENCOUNTER — TELEPHONE (OUTPATIENT)
Dept: FAMILY MEDICINE CLINIC | Facility: HOSPITAL | Age: 58
End: 2023-09-25

## 2023-09-25 NOTE — TELEPHONE ENCOUNTER
----- Message from Jann Brooke DO sent at 9/22/2023  4:37 PM EDT -----  Please notify pt/wife that we received a reminder that he is due for a CT of the chest to f/u on a RUL lung nodule. Order was placed in [address] [place] [address] 15. Please call and schedule CT scan .  TY

## 2023-10-16 ENCOUNTER — OFFICE VISIT (OUTPATIENT)
Dept: SURGERY | Facility: HOSPITAL | Age: 58
End: 2023-10-16
Payer: COMMERCIAL

## 2023-10-16 VITALS
DIASTOLIC BLOOD PRESSURE: 100 MMHG | HEIGHT: 70 IN | HEART RATE: 110 BPM | BODY MASS INDEX: 36.22 KG/M2 | WEIGHT: 253 LBS | SYSTOLIC BLOOD PRESSURE: 162 MMHG

## 2023-10-16 DIAGNOSIS — K80.20 CALCULUS OF GALLBLADDER WITHOUT CHOLECYSTITIS WITHOUT OBSTRUCTION: Primary | ICD-10-CM

## 2023-10-16 PROCEDURE — 99213 OFFICE O/P EST LOW 20 MIN: CPT | Performed by: SURGERY

## 2023-11-03 NOTE — PROGRESS NOTES
Assessment/Plan:   Diogo Stevenson is a 63 y. o.male who is here for presents to discuss gallstones. It is difficult assess his abdominal pain given aphasia. Discussed with family and he appears to eat any foods with no obvious post prandial pain. .    Plan: gallstones -  discussed operative vs conservative mgt, surgical approaches, risks and benefits and patient does not appear to have post prandial pain from history from his family. I would not recommend lap dread at this time. Preoperative Clearance: None    HPI:  Diogo Stevenson is a 63 y. o.male who is here for presents to discuss gallstones. It is difficult assess his abdominal pain given aphasia. Discussed with family and he appears to eat any foods with no obvious post prandial pain. ROS:  General ROS: negative  negative for - chills, fatigue, fever or night sweats, weight loss  Respiratory ROS: no cough, shortness of breath, or wheezing  Cardiovascular ROS: no chest pain or dyspnea on exertion  Genito-Urinary ROS: no dysuria, trouble voiding, or hematuria  Musculoskeletal ROS: negative for - gait disturbance, joint pain or muscle pain  Neurological ROS: no TIA or stroke symptoms      [unfilled]  Patient has no known allergies.     Current Outpatient Medications:     acetaminophen (TYLENOL) 325 mg tablet, Take 2 tablets (650 mg total) by mouth every 6 (six) hours as needed for mild pain (Patient not taking: Reported on 6/26/2023), Disp: 60 tablet, Rfl: 0    ibuprofen (MOTRIN) 200 mg tablet, Take 3 tablets (600 mg total) by mouth every 6 (six) hours as needed for moderate pain, Disp: 60 tablet, Rfl: 0    lisinopril (ZESTRIL) 10 mg tablet, Take 1 tablet (10 mg total) by mouth daily, Disp: 30 tablet, Rfl: 0    methocarbamol (ROBAXIN) 500 mg tablet, Take 1 tablet (500 mg total) by mouth 3 (three) times a day as needed for muscle spasms (Patient not taking: Reported on 6/26/2023), Disp: 30 tablet, Rfl: 0    multivitamin (THERAGRAN) TABS, Take 1 tablet by mouth daily, Disp: , Rfl:   Past Medical History:   Diagnosis Date    CVA (cerebral vascular accident) ([address])     Hypertension      Past Surgical History:   Procedure Laterality Date    CARDIAC CATHETERIZATION      CARDIAC SURGERY      [place] EXC B9 LESION MRGN XCP SK TG T/A/L 1.1-2.0 CM Right 6/14/2022    Procedure: EXCISION  BIOPSY LESION/MASS BACK;  Surgeon: Gavin Dickinson MD;  Location:  MAIN OR;  Service: General    TISSUE PLASMINOGEN ACTIVATOR (TPA), EIA(HISTORICAL)       Family History   Problem Relation Age of Onset    COPD Mother     Diabetes Mother     Alzheimer's disease Mother     No Known Problems Father     Asthma Child       reports that he quit smoking about 4 years ago. His smoking use included cigarettes. He has never used smokeless tobacco. He reports that he does not currently use alcohol. He reports that he does not use drugs. Labs:   Lab Results   Component Value Date    WBC 6.91 04/25/2023    WBC 6.24 01/26/2023    WBC 8.70 10/27/2022    HGB 17.5 (H) 04/25/2023    HGB 16.7 01/26/2023    HGB 17.7 (H) 10/27/2022     04/25/2023     01/26/2023     10/27/2022     Lab Results   Component Value Date    ALT 42 04/25/2023    ALT 52 01/26/2023    ALT 49 10/27/2022    ALT 15 06/07/2018    AST 27 04/25/2023    AST 38 01/26/2023    AST 29 10/27/2022    AST 14 06/07/2018     This SmartLink has not been configured with any valid records.        PHYSICAL EXAM  General Appearance:    Alert, cooperative, no distress,    Head:    Normocephalic without obvious abnormality   Eyes:    PERRL, conjunctiva/corneas clear, EOM's intact        Neck:   Supple, no adenopathy, no JVD   Back:     Symmetric, no spinal or CVA tenderness   Lungs:     Clear to auscultation bilaterally, no wheezing or rhonchi   Heart:    Regular rate and rhythm, S1 and S2 normal, no murmur   Abdomen:     Soft +BS ND NT    Extremities:   Extremities normal. No clubbing, cyanosis or edema   Psych:   Normal Affect, AOx3. Neurologic:  Skin:   CNII-XII intact. Strength symmetric, speech intact    Warm, dry, intact, no visible rashes or lesions         Physical Exam              Some portions of this record may have been generated with voice recognition software. There may be translation, syntax,  or grammatical errors. Occasional wrong word or "sound-a-like" substitutions may have occurred due to the inherent limitations of the voice recognition software. Read the chart carefully and recognize, using context, where substitutions may have occurred. If you have any questions, please contact the dictating provider for clarification or correction, as needed. This encounter has been coded by a non-certified coder.

## 2024-11-08 ENCOUNTER — APPOINTMENT (EMERGENCY)
Dept: RADIOLOGY | Facility: HOSPITAL | Age: 59
End: 2024-11-08
Payer: COMMERCIAL

## 2024-11-08 ENCOUNTER — HOSPITAL ENCOUNTER (EMERGENCY)
Facility: HOSPITAL | Age: 59
Discharge: HOME | End: 2024-11-08
Attending: EMERGENCY MEDICINE
Payer: COMMERCIAL

## 2024-11-08 VITALS
RESPIRATION RATE: 20 BRPM | OXYGEN SATURATION: 99 % | TEMPERATURE: 97.6 F | HEART RATE: 98 BPM | BODY MASS INDEX: 37.02 KG/M2 | WEIGHT: 258 LBS | SYSTOLIC BLOOD PRESSURE: 183 MMHG | DIASTOLIC BLOOD PRESSURE: 113 MMHG

## 2024-11-08 DIAGNOSIS — B34.9 VIRAL SYNDROME: Primary | ICD-10-CM

## 2024-11-08 PROCEDURE — 99283 EMERGENCY DEPT VISIT LOW MDM: CPT | Performed by: EMERGENCY MEDICINE

## 2024-11-08 PROCEDURE — 99282 EMERGENCY DEPT VISIT SF MDM: CPT

## 2024-11-08 NOTE — ED NOTES
"Pt refused to take initial triage vitals, but later was agreeable to take them. Pt followed with stating \"Today\" while pointing at his photo at a picture of sputum. Pt signed \"Fuck you\" to this nurse.      Yoel Gonsalez RN  11/08/24 5630    "

## 2024-11-08 NOTE — ED NOTES
Patient refusing chest xray and strep/covid swabs. Dr. Brown aware of same.    Criss Doherty RN  11/08/24 1419       Criss Doherty RN  11/08/24 6163

## 2024-11-08 NOTE — ED NOTES
"Patient walked up to front nurses station, slammed his helmet on the sink, stared at staff whom was asking him if everything was okay, ripped his name band off and threw it in the trash, and said \"bye\" and walked out.      Sparkle Erwin RN  11/08/24 7633    "

## 2024-11-09 NOTE — ED PROVIDER NOTES
"Time reflects when diagnosis was documented in both MDM as applicable and the Disposition within this note       Time User Action Codes Description Comment    11/8/2024  2:22 PM Khai Brown [B34.9] Viral syndrome           ED Disposition       ED Disposition   Discharge    Condition   Stable    Date/Time   Fri Nov 8, 2024  2:22 PM    Comment   Geoff BORJA Boehringer II discharge to home/self care.                   Assessment & Plan       Medical Decision Making  59-year-old male presenting with cough.  Advised viral swab, strep and chest x-ray however patient declined.  Discussed his symptoms are consistent with viral etiology and treatment is supportive.  Patient agreeable and stable for discharge.    Amount and/or Complexity of Data Reviewed  Labs: ordered.             Medications - No data to display    ED Risk Strat Scores                                               History of Present Illness       Chief Complaint   Patient presents with    Sore Throat     Pt arrives with complications discussing chief complain. Pt took phone and pointed at a picture with sputum and tinge of blood. Pt states \"Today and cold\" while pointing at his chest.        Past Medical History:   Diagnosis Date    CVA (cerebral vascular accident) (HCC)     Hypertension       Past Surgical History:   Procedure Laterality Date    CARDIAC CATHETERIZATION      CARDIAC SURGERY      [place] EXC B9 LESION MRGN XCP SK TG T/A/L 1.1-2.0 CM Right 6/14/2022    Procedure: EXCISION  BIOPSY LESION/MASS BACK;  Surgeon: Randolph Lima MD;  Location:  MAIN OR;  Service: General    TISSUE PLASMINOGEN ACTIVATOR (TPA), EIA(HISTORICAL)        Family History   Problem Relation Age of Onset    COPD Mother     Diabetes Mother     Alzheimer's disease Mother     No Known Problems Father     Asthma Child       Social History     Tobacco Use    Smoking status: Former     Current packs/day: 0.00     Types: Cigarettes     Quit date: 2019     Years since quitting: " 5.8    Smokeless tobacco: Never   Vaping Use    Vaping status: Never Used   Substance Use Topics    Alcohol use: Not Currently    Drug use: Never      E-Cigarette/Vaping    E-Cigarette Use Never User       E-Cigarette/Vaping Substances    Nicotine No     THC No     CBD No     Flavoring No     Other No     Unknown No       I have reviewed and agree with the history as documented.     59-year-old male presents for evaluation of cough that started a few days ago.  Denies chest pain, shortness of breath.  Requesting pills to help his symptoms.        Review of Systems   Respiratory:  Positive for cough.            Objective       ED Triage Vitals   Temperature Pulse Blood Pressure Respirations SpO2 Patient Position - Orthostatic VS   11/08/24 1341 11/08/24 1343 11/08/24 1343 11/08/24 1343 11/08/24 1343 --   97.6 °F (36.4 °C) 98 (!) 183/113 20 99 %       Temp Source Heart Rate Source BP Location FiO2 (%) Pain Score    11/08/24 1341 11/08/24 1343 -- -- --    Temporal Monitor         Vitals      Date and Time Temp Pulse SpO2 Resp BP Pain Score FACES Pain Rating User   11/08/24 1343 -- 98 99 % 20 183/113 -- --    11/08/24 1341 97.6 °F (36.4 °C) -- -- -- -- -- --             Physical Exam  Vitals and nursing note reviewed.   Constitutional:       General: He is not in acute distress.     Appearance: He is well-developed.   HENT:      Head: Normocephalic and atraumatic.      Right Ear: External ear normal.      Left Ear: External ear normal.      Nose: Nose normal.   Eyes:      General: No scleral icterus.  Pulmonary:      Effort: Pulmonary effort is normal. No respiratory distress.   Abdominal:      General: There is no distension.      Palpations: Abdomen is soft.   Musculoskeletal:         General: No deformity. Normal range of motion.      Cervical back: Normal range of motion and neck supple.   Skin:     General: Skin is warm.      Findings: No rash.   Neurological:      General: No focal deficit present.      Mental  Status: He is alert.      Gait: Gait normal.   Psychiatric:         Mood and Affect: Mood normal.         Results Reviewed       Procedure Component Value Units Date/Time    Strep A PCR [[id number]]     Lab Status: No result Specimen: Throat     COVID-19/ Infleunza A/B Rapid Anitgen(30 min. TAT) [[id number]]     Lab Status: No result Specimen: Nares from Nose             No orders to display       Procedures    ED Medication and Procedure Management   Prior to Admission Medications   Prescriptions Last Dose Informant Patient Reported? Taking?   acetaminophen (TYLENOL) 325 mg tablet  Self No No   Sig: Take 2 tablets (650 mg total) by mouth every 6 (six) hours as needed for mild pain   Patient not taking: Reported on 6/26/2023   ibuprofen (MOTRIN) 200 mg tablet  Self No No   Sig: Take 3 tablets (600 mg total) by mouth every 6 (six) hours as needed for moderate pain   lisinopril (ZESTRIL) 10 mg tablet   No No   Sig: Take 1 tablet (10 mg total) by mouth daily   methocarbamol (ROBAXIN) 500 mg tablet  Self No No   Sig: Take 1 tablet (500 mg total) by mouth 3 (three) times a day as needed for muscle spasms   Patient not taking: Reported on 6/26/2023   multivitamin (THERAGRAN) TABS  Self Yes No   Sig: Take 1 tablet by mouth daily      Facility-Administered Medications: None     Discharge Medication List as of 11/8/2024  2:23 PM        CONTINUE these medications which have NOT CHANGED    Details   acetaminophen (TYLENOL) 325 mg tablet Take 2 tablets (650 mg total) by mouth every 6 (six) hours as needed for mild pain, Starting Tue 6/14/2022, No Print      ibuprofen (MOTRIN) 200 mg tablet Take 3 tablets (600 mg total) by mouth every 6 (six) hours as needed for moderate pain, Starting Tue 6/14/2022, No Print      lisinopril (ZESTRIL) 10 mg tablet Take 1 tablet (10 mg total) by mouth daily, Starting Mon 10/10/2022, Until Wed 11/9/2022, Normal      methocarbamol (ROBAXIN) 500 mg tablet Take 1 tablet (500 mg total) by mouth 3  (three) times a day as needed for muscle spasms, Starting Thu 9/1/2022, Normal      multivitamin (THERAGRAN) TABS Take 1 tablet by mouth daily, Historical Med           No discharge procedures on file.  ED SEPSIS DOCUMENTATION   Time reflects when diagnosis was documented in both MDM as applicable and the Disposition within this note       Time User Action Codes Description Comment    11/8/2024  2:22 PM Khai Brown Add [B34.9] Viral syndrome                  Khai Brown DO  11/08/24 2016

## 2025-07-07 ENCOUNTER — HOSPITAL ENCOUNTER (EMERGENCY)
Dept: HOSPITAL 99 - EMR | Age: 60
Discharge: HOME | End: 2025-07-07
Payer: COMMERCIAL

## 2025-07-07 VITALS — SYSTOLIC BLOOD PRESSURE: 165 MMHG | DIASTOLIC BLOOD PRESSURE: 100 MMHG

## 2025-07-07 VITALS — SYSTOLIC BLOOD PRESSURE: 162 MMHG | DIASTOLIC BLOOD PRESSURE: 121 MMHG

## 2025-07-07 DIAGNOSIS — Z86.73: ICD-10-CM

## 2025-07-07 DIAGNOSIS — R47.01: Primary | ICD-10-CM

## 2025-07-07 DIAGNOSIS — Z91.199: ICD-10-CM

## 2025-07-07 DIAGNOSIS — R73.9: ICD-10-CM

## 2025-07-07 LAB
ALBUMIN SERPL-MCNC: 5.1 G/DL (ref 3.5–5)
ALP SERPL-CCNC: 87 U/L (ref 38–126)
ALT SERPL-CCNC: 41 U/L (ref 0–50)
AST SERPL-CCNC: 31 U/L (ref 17–59)
BASOPHILS # BLD AUTO: 0 10^3/UL (ref 0–0.2)
BASOPHILS NFR BLD AUTO: 0.5 % (ref 0–2)
BILIRUB SERPL-MCNC: 1.3 MG/DL (ref 0.2–1.3)
BUN SERPL-MCNC: 15 MG/DL (ref 9–20)
CALCIUM SERPL-MCNC: 9.8 MG/DL (ref 8.4–10.2)
CHLORIDE SERPL-SCNC: 102 MMOL/L (ref 98–107)
CO2 SERPL-SCNC: 24 MMOL/L (ref 22–30)
COMMENT: (no result)
CREAT SERPL-MCNC: 1.1 MG/DL (ref 0.7–1.3)
EGFR: > 60
EOSINOPHIL # BLD AUTO: 0.1 10^3/UL (ref 0–0.7)
EOSINOPHIL NFR BLD AUTO: 0.9 % (ref 0–6)
ERYTHROCYTE [DISTWIDTH] IN BLOOD BY AUTOMATED COUNT: 12.2 % (ref 11.5–14.5)
ESTIMATED CREATININE CLEARANCE: (no result) ML/MIN
GLUCOSE - POINT OF CARE: 297 MG/DL (ref 70–99)
GLUCOSE SERPL-MCNC: 286 MG/DL (ref 70–99)
HCT VFR BLD AUTO: 46.8 % (ref 39–52)
HGB BLD-MCNC: 16.6 G/DL (ref 13–18)
IMM GRANULOCYTES # BLD AUTO: 0 10^3/UL (ref 0–0.05)
IMM GRANULOCYTES NFR BLD AUTO: 0.2 % (ref 0–0.5)
LYMPHOCYTES # BLD AUTO: 1.7 10^3/UL (ref 1.2–3.4)
LYMPHOCYTES NFR BLD AUTO: 29.1 % (ref 20.5–51.1)
MCH RBC QN AUTO: 32.7 PG (ref 27–31)
MCHC RBC AUTO-ENTMCNC: 35.5 G/DL (ref 33–37)
MCV RBC AUTO: 92.1 FL (ref 80–94)
MONOCYTES # BLD AUTO: 0.4 10^3/UL (ref 0.1–0.6)
MONOCYTES NFR BLD AUTO: 6.6 % (ref 1.7–9.3)
NEUTROPHILS # BLD AUTO: 3.6 10^3/UL (ref 1.4–6.5)
NEUTROPHILS NFR BLD AUTO: 62.7 % (ref 42.2–75.2)
NRBC BLD AUTO-RTO: 0 %
PLATELET # BLD AUTO: 173 10^3/UL (ref 130–400)
PMV BLD AUTO: 9.4 FL (ref 7.4–10.4)
POTASSIUM SERPL-SCNC: 4.1 MMOL/L (ref 3.5–5.1)
PROT SERPL-MCNC: 7.6 G/DL (ref 6.3–8.2)
RBC # BLD AUTO: 5.08 10^6/UL (ref 4.7–6.1)
SODIUM SERPL-SCNC: 139 MMOL/L (ref 135–145)
WBC # BLD AUTO: 5.8 10^3/UL (ref 4.8–10.8)

## 2025-07-07 PROCEDURE — 99284 EMERGENCY DEPT VISIT MOD MDM: CPT

## (undated) DEVICE — SURGICAL CLIPPER BLADE GENERAL USE

## (undated) DEVICE — GLOVE SRG BIOGEL 6.5

## (undated) DEVICE — INTENDED FOR TISSUE SEPARATION, AND OTHER PROCEDURES THAT REQUIRE A SHARP SURGICAL BLADE TO PUNCTURE OR CUT.: Brand: BARD-PARKER SAFETY BLADES SIZE 15, STERILE

## (undated) DEVICE — SUT MONOCRYL 4-0 PS-2 27 IN Y426H

## (undated) DEVICE — GLOVE INDICATOR PI UNDERGLOVE SZ 8 BLUE

## (undated) DEVICE — SPECIMEN CONTAINER STERILE PEEL PACK

## (undated) DEVICE — SUT VICRYL 3-0 SH 27 IN J416H

## (undated) DEVICE — SUT VICRYL 2-0 SH 27 IN UNDYED J417H

## (undated) DEVICE — ELECTRODE BLADE MOD E-Z CLEAN  2.75IN 7CM -0012AM

## (undated) DEVICE — CHLORAPREP HI-LITE 26ML ORANGE

## (undated) DEVICE — GLOVE INDICATOR PI UNDERGLOVE SZ 6.5 BLUE

## (undated) DEVICE — PLUMEPEN PRO 10FT

## (undated) DEVICE — GLOVE SRG BIOGEL ECLIPSE 8

## (undated) DEVICE — PENCIL ELECTROSURG E-Z CLEAN -0035H

## (undated) DEVICE — ADHESIVE SKIN HIGH VISCOSITY EXOFIN 1ML